# Patient Record
Sex: MALE | Race: WHITE | NOT HISPANIC OR LATINO | Employment: OTHER | URBAN - METROPOLITAN AREA
[De-identification: names, ages, dates, MRNs, and addresses within clinical notes are randomized per-mention and may not be internally consistent; named-entity substitution may affect disease eponyms.]

---

## 2021-02-16 ENCOUNTER — TRANSFERRED RECORDS (OUTPATIENT)
Dept: HEALTH INFORMATION MANAGEMENT | Facility: CLINIC | Age: 71
End: 2021-02-16

## 2021-04-15 ENCOUNTER — TRANSFERRED RECORDS (OUTPATIENT)
Dept: HEALTH INFORMATION MANAGEMENT | Facility: CLINIC | Age: 71
End: 2021-04-15

## 2021-05-17 ENCOUNTER — TRANSFERRED RECORDS (OUTPATIENT)
Dept: HEALTH INFORMATION MANAGEMENT | Facility: CLINIC | Age: 71
End: 2021-05-17

## 2021-08-27 ENCOUNTER — TRANSFERRED RECORDS (OUTPATIENT)
Dept: HEALTH INFORMATION MANAGEMENT | Facility: CLINIC | Age: 71
End: 2021-08-27

## 2021-08-28 ENCOUNTER — TRANSFERRED RECORDS (OUTPATIENT)
Dept: HEALTH INFORMATION MANAGEMENT | Facility: CLINIC | Age: 71
End: 2021-08-28

## 2021-11-12 ENCOUNTER — TRANSFERRED RECORDS (OUTPATIENT)
Dept: HEALTH INFORMATION MANAGEMENT | Facility: CLINIC | Age: 71
End: 2021-11-12

## 2021-12-02 ENCOUNTER — TRANSFERRED RECORDS (OUTPATIENT)
Dept: HEALTH INFORMATION MANAGEMENT | Facility: CLINIC | Age: 71
End: 2021-12-02

## 2022-01-25 ENCOUNTER — TRANSCRIBE ORDERS (OUTPATIENT)
Dept: OTHER | Age: 72
End: 2022-01-25
Payer: COMMERCIAL

## 2022-01-25 DIAGNOSIS — G25.2 OTHER SPECIFIED FORMS OF TREMOR: Primary | ICD-10-CM

## 2022-01-26 ENCOUNTER — DOCUMENTATION ONLY (OUTPATIENT)
Dept: NEUROSURGERY | Facility: CLINIC | Age: 72
End: 2022-01-26
Payer: COMMERCIAL

## 2022-01-26 NOTE — PROGRESS NOTES
Imaging disk received from [facility] and sent to  for processing. Reports sent to scanning    Images on disk:  XR Hand Left 12/2/21  XR Hand Left 12/2/21  US Carotid bilat 8/27/21  XR Hand Right 2/16/21  MRI Brain 5/17/21

## 2022-02-02 ENCOUNTER — TELEPHONE (OUTPATIENT)
Dept: NEUROSURGERY | Facility: CLINIC | Age: 72
End: 2022-02-02
Payer: COMMERCIAL

## 2022-02-02 NOTE — TELEPHONE ENCOUNTER
Writer GEOFF for pt regarding neurosurgery referral    Please schedule a new in person or virtual visit with Dr. Otero for next available    Shakila Cheng

## 2022-02-04 NOTE — TELEPHONE ENCOUNTER
FUTURE VISIT INFORMATION      FUTURE VISIT INFORMATION:    Date: 2/8/2022    Time: 10am    Location: [facility]  REFERRAL INFORMATION:    Referring provider:  Dr. Venegas     Referring providers clinic:  [facility]     Reason for visit/diagnosis  Tremor    RECORDS REQUESTED FROM:       Clinic name Comments Records Status Imaging Status   [facility]   Requested  PACS                                   2/4/2022-Request for records and Imaging reports faxed to [facility]-MR @ 520am    2/8/2022-2nd request for records and Imaging reports faxed to [facility], Dr. Otero Nurse notified-MR @ 612am    2/9/2022-[facility] Records scanned to Chart-MR @ 238pm

## 2022-02-08 ENCOUNTER — VIRTUAL VISIT (OUTPATIENT)
Dept: NEUROSURGERY | Facility: CLINIC | Age: 72
End: 2022-02-08
Payer: COMMERCIAL

## 2022-02-08 ENCOUNTER — PRE VISIT (OUTPATIENT)
Dept: NEUROSURGERY | Facility: CLINIC | Age: 72
End: 2022-02-08

## 2022-02-08 DIAGNOSIS — G25.0 ESSENTIAL TREMOR: Primary | ICD-10-CM

## 2022-02-08 PROCEDURE — 99204 OFFICE O/P NEW MOD 45 MIN: CPT | Mod: 95 | Performed by: NEUROLOGICAL SURGERY

## 2022-02-08 RX ORDER — PANTOPRAZOLE SODIUM 40 MG/1
40 TABLET, DELAYED RELEASE ORAL 2 TIMES DAILY
COMMUNITY
Start: 2021-08-13

## 2022-02-08 RX ORDER — CARBOXYMETHYLCELLULOSE SODIUM 10 MG/ML
1 GEL OPHTHALMIC 4 TIMES DAILY
COMMUNITY
Start: 2021-08-25

## 2022-02-08 RX ORDER — SENNOSIDES A AND B 8.6 MG/1
1 TABLET, FILM COATED ORAL DAILY
COMMUNITY
Start: 2021-12-16 | End: 2022-10-24

## 2022-02-08 RX ORDER — TAMSULOSIN HYDROCHLORIDE 0.4 MG/1
1 CAPSULE ORAL DAILY
COMMUNITY
Start: 2021-12-16

## 2022-02-08 RX ORDER — MIRTAZAPINE 30 MG/1
15 TABLET, FILM COATED ORAL AT BEDTIME
COMMUNITY
Start: 2021-06-10

## 2022-02-08 RX ORDER — BUPROPION HYDROCHLORIDE 150 MG/1
150 TABLET, EXTENDED RELEASE ORAL DAILY
COMMUNITY
End: 2022-09-09

## 2022-02-08 RX ORDER — SIMVASTATIN 40 MG
0.5 TABLET ORAL AT BEDTIME
COMMUNITY
Start: 2021-06-29 | End: 2023-04-03

## 2022-02-08 RX ORDER — CYPROHEPTADINE HYDROCHLORIDE 4 MG/1
12 TABLET ORAL AT BEDTIME
COMMUNITY
Start: 2021-12-13

## 2022-02-08 RX ORDER — FINASTERIDE 5 MG/1
1 TABLET, FILM COATED ORAL DAILY
COMMUNITY
Start: 2021-09-09

## 2022-02-08 RX ORDER — GABAPENTIN 300 MG/1
CAPSULE ORAL
COMMUNITY

## 2022-02-08 RX ORDER — MIRABEGRON 50 MG/1
1 TABLET, EXTENDED RELEASE ORAL DAILY
COMMUNITY
Start: 2021-12-16 | End: 2022-09-09

## 2022-02-08 RX ORDER — TERAZOSIN 10 MG/1
10 CAPSULE ORAL AT BEDTIME
COMMUNITY
End: 2022-09-09

## 2022-02-08 RX ORDER — PRAZOSIN HYDROCHLORIDE 2 MG/1
4 CAPSULE ORAL DAILY
COMMUNITY
End: 2022-09-09

## 2022-02-08 RX ORDER — SERTRALINE HYDROCHLORIDE 100 MG/1
150 TABLET, FILM COATED ORAL DAILY
COMMUNITY
Start: 2021-06-10 | End: 2023-04-03

## 2022-02-08 RX ORDER — FOLIC ACID 1 MG/1
1 TABLET ORAL DAILY
COMMUNITY
Start: 2021-09-09

## 2022-02-08 RX ORDER — TRIAMCINOLONE ACETONIDE 1 MG/G
1 CREAM TOPICAL DAILY PRN
COMMUNITY
Start: 2021-12-16 | End: 2023-04-12

## 2022-02-08 RX ORDER — OXYBUTYNIN CHLORIDE 5 MG/1
5 TABLET ORAL 3 TIMES DAILY
COMMUNITY
End: 2022-09-09

## 2022-02-08 NOTE — LETTER
2/8/2022       RE: Arley Butt  [address] [facility] [address]     Dear Colleague,    Thank you for referring your patient, Arley Butt, to the [facility]OS[facility] [place] at [facility]. Please see a copy of my visit note below.    Neurosurgery Attending DBS Consult    No chief complaint on file.      HISTORY OF PRESENT ILLNESS  70 y/o right handed gentleman with ET referred for neurosurgical consideration for DBS from the [facility]. He has had bilateral hand tremors, R>L for a few years, but it has significantly worsened over the last year or so. Has tried primidone and propranolol without benefit. It is interfering significantly with his daily living activities including eating, drinking, writing. He has not gone through any of the DBS workup here yet or discussed anything with Yusuf or any providers at [facility] yet. He reportedly had neuropsych testing locally which showed mild neurocognitive dysfunction. I let he and his daughter know that he would have to repeat this.       DBS goals: Improve his hand tremor so he can more easily eat/drink      No past medical history on file.    No past surgical history on file.    No family history on file.    Social History     Socioeconomic History     Marital status:      Spouse name: Not on file     Number of children: Not on file     Years of education: Not on file     Highest education level: Not on file   Occupational History     Not on file   Tobacco Use     Smoking status: Not on file     Smokeless tobacco: Not on file   Substance and Sexual Activity     Alcohol use: Not on file     Drug use: Not on file     Sexual activity: Not on file   Other Topics Concern     Not on file   Social History Narrative     Not on file     Social Determinants of Health     Financial Resource Strain: Not on file   Food Insecurity: Not on file   Transportation Needs: Not on file   Physical Activity: Not on  file   Stress: Not on file   Social Connections: Not on file   Intimate Partner Violence: Not on file   Housing Stability: Not on file        Not on File    Current Outpatient Medications   Medication     Carboxymethylcellulose Sodium 1 % GEL     cholecalciferol 25 MCG (1000 UT) TABS     cyproheptadine (PERIACTIN) 4 MG tablet     finasteride (PROSCAR) 5 MG tablet     folic acid (FOLVITE) 1 MG tablet     mirabegron (MYRBETRIQ) 50 MG 24 hr tablet     mirtazapine (REMERON) 30 MG tablet     pantoprazole (PROTONIX) 40 MG EC tablet     propylene glycol (SYSTANE BALANCE) 0.6 % SOLN ophthalmic solution     senna (SENOKOT) 8.6 MG tablet     sertraline (ZOLOFT) 100 MG tablet     simvastatin (ZOCOR) 40 MG tablet     tamsulosin (FLOMAX) 0.4 MG capsule     triamcinolone (KENALOG) 0.1 % external cream     vitamin B-12 (CYANOCOBALAMIN) 1000 MCG tablet     buPROPion (WELLBUTRIN SR) 150 MG 12 hr tablet     gabapentin (NEURONTIN) 300 MG capsule     oxybutynin (DITROPAN) 5 MG tablet     prazosin (MINIPRESS) 2 MG capsule     terazosin (HYTRIN) 10 MG capsule     No current facility-administered medications for this visit.         PHYSICAL EXAM  There were no vitals taken for this visit.    Neurological (Video visit)  Awake, alert and oriented to date, time, place and person. Speech fluent.     ASSESSMENT   71 year old right handed gentleman with a history of ET. We discussed the general process for undergoing DBS surgery for his hand tremor. He will need to go through the DBS workup and I will meet with him again to discuss the surgery and hardware in more detail.     PLAN:  1. Set up DBS workup with Yusuf Otero MD

## 2022-02-08 NOTE — PROGRESS NOTES
Neurosurgery Attending DBS Consult    No chief complaint on file.      HISTORY OF PRESENT ILLNESS  70 y/o right handed gentleman with ET referred for neurosurgical consideration for DBS from the [facility]. He has had bilateral hand tremors, R>L for a few years, but it has significantly worsened over the last year or so. Has tried primidone and propranolol without benefit. It is interfering significantly with his daily living activities including eating, drinking, writing. He has not gone through any of the DBS workup here yet or discussed anything with Yusuf or any providers at [facility] yet. He reportedly had neuropsych testing locally which showed mild neurocognitive dysfunction. I let he and his daughter know that he would have to repeat this.       DBS goals: Improve his hand tremor so he can more easily eat/drink      No past medical history on file.    No past surgical history on file.    No family history on file.    Social History     Socioeconomic History     Marital status:      Spouse name: Not on file     Number of children: Not on file     Years of education: Not on file     Highest education level: Not on file   Occupational History     Not on file   Tobacco Use     Smoking status: Not on file     Smokeless tobacco: Not on file   Substance and Sexual Activity     Alcohol use: Not on file     Drug use: Not on file     Sexual activity: Not on file   Other Topics Concern     Not on file   Social History Narrative     Not on file     Social Determinants of Health     Financial Resource Strain: Not on file   Food Insecurity: Not on file   Transportation Needs: Not on file   Physical Activity: Not on file   Stress: Not on file   Social Connections: Not on file   Intimate Partner Violence: Not on file   Housing Stability: Not on file        Not on File    Current Outpatient Medications   Medication     Carboxymethylcellulose Sodium 1 % GEL     cholecalciferol 25 MCG (1000 UT) TABS     cyproheptadine  (PERIACTIN) 4 MG tablet     finasteride (PROSCAR) 5 MG tablet     folic acid (FOLVITE) 1 MG tablet     mirabegron (MYRBETRIQ) 50 MG 24 hr tablet     mirtazapine (REMERON) 30 MG tablet     pantoprazole (PROTONIX) 40 MG EC tablet     propylene glycol (SYSTANE BALANCE) 0.6 % SOLN ophthalmic solution     senna (SENOKOT) 8.6 MG tablet     sertraline (ZOLOFT) 100 MG tablet     simvastatin (ZOCOR) 40 MG tablet     tamsulosin (FLOMAX) 0.4 MG capsule     triamcinolone (KENALOG) 0.1 % external cream     vitamin B-12 (CYANOCOBALAMIN) 1000 MCG tablet     buPROPion (WELLBUTRIN SR) 150 MG 12 hr tablet     gabapentin (NEURONTIN) 300 MG capsule     oxybutynin (DITROPAN) 5 MG tablet     prazosin (MINIPRESS) 2 MG capsule     terazosin (HYTRIN) 10 MG capsule     No current facility-administered medications for this visit.         PHYSICAL EXAM  There were no vitals taken for this visit.    Neurological (Video visit)  Awake, alert and oriented to date, time, place and person. Speech fluent.           ASSESSMENT   71 year old right handed gentleman with a history of ET. We discussed the general process for undergoing DBS surgery for his hand tremor. He will need to go through the DBS workup and I will meet with him again to discuss the surgery and hardware in more detail.     PLAN:  1. Set up DBS workup with Yusuf Leary            Arley is a 71 year old who is being evaluated via a billable video visit.      How would you like to obtain your AVS? Mychart  If the video visit is dropped, the invitation should be resent by: [phone]      Video Start Time: 10:25  Video-Visit Details    Type of service:  Video Visit    Video End Time:10:58    Originating Location (pt. Location): Home    Distant Location (provider location):  [facility] NEUROSURGERY [facility]     Platform used for Video Visit: [employer]

## 2022-02-09 ENCOUNTER — TELEPHONE (OUTPATIENT)
Dept: NEUROLOGY | Facility: CLINIC | Age: 72
End: 2022-02-09
Payer: COMMERCIAL

## 2022-02-09 NOTE — TELEPHONE ENCOUNTER
Spoke to Leslie, patient's daughter. The patient was scheduled on 2/23/22 at 2:30 PM with Dr. Plummer.

## 2022-02-09 NOTE — TELEPHONE ENCOUNTER
----- Message from Lana Rodriguez RN sent at 2/8/2022  3:33 PM CST -----  Regarding: [facility] referral for DBS  Hi, Yusuf.     [facility] to [facility] for DBS for treatment of tremor. He was mistakenly scheduled in Dr. Otero's clinic instead of going through one of the movement neurologists first. We were aware of this and Dr. Otero agreed to meet with the pt anyway. Pt has a community care referral even though DBS can now be done at the [facility]. [facility]     Can you follow-up with the pt to schedule him for a consult with a movement disorder provider to get the process going? If the provider thinks he should proceed with workup, Dr. Otero would like the pt to be scheduled with him again, as they did not discuss surgery or the different devices in detail.     Let me know if you have any questions. I have some records which I can send to HIM from [employer]. Let me know if you want me to forward them to you as well.     Thank you,  Lana  ----- Message -----  From: Bartolome Barlow MA  Sent: 2/8/2022   6:29 AM CST  To: Lana Rodriguez RN  Subject: [facility] Records and Imaging reports         We are still waiting on records from the [facility]. 2 Request have been made including this morning. I will keep you posted once received.       Farhat Barlow GERARD/CSS

## 2022-02-10 NOTE — TELEPHONE ENCOUNTER
RECORDS RECEIVED FROM: External   REASON FOR VISIT: DBS consult    Date of Appt: 2/23/22   NOTES (FOR ALL VISITS) STATUS DETAILS   OFFICE NOTE from referring provider Received Dr Theo Ny @ [facility] Neurology:  8/20/21   OFFICE NOTES from other providers Received Logan Cerda @ [facility]:  7/30/21   MEDICATION LIST Received    IMAGING  (FOR ALL VISITS)     ULTRASOUND (CAROTID BILAT) *VASCULAR* Received [facility]:  US Carotid Bilat 8/27/21   MRI (HEAD, NECK, SPINE) Received [facility]:  MRI Brain 5/17/21      Action 2/10/22 MV 10.12am   Action Taken Records request faxed to [facility]    --2/21/22 MV 11.06am--  Tried calling [facility], no answer. LVM. Also refaxed request.    --2/22/22 MV 12.37pm--  Recs received from [facility] and sent to scanning

## 2022-02-18 NOTE — PROGRESS NOTES
Department of Neurology  Movement Disorders Division   New Patient Visit    Patient: Arley Butt   MRN: [id number]   : 1950   Date of Visit: 2022    CC: DBS for tremor    HPI:  Mr. Butt is a 72 yo right handed man who was referred for evaluation of DBS for tremor.  Started about 4 years ago in his right hand.  He is accompanied by his daughter to helps to provide history.  Has progressively worsened and now present in the left hand at times.  No tremor elsewhere in his body.  He doesn't drink alcohol for the past 8 years.  No rest tremor, only with activity.  No alleviating or aggravating symptoms.    He is using weighted utensils.    Currently weaning off of gabapentin.  He takes this for mood and nightmares.  Hasn't impacted his tremor.    Primidone and propranolol tried previously but were ineffective.  Carbidopa/levodopa made him orthostatic and didn't help his tremor either.    His thinks his father had tremor.    Goal for DBS: to improve his writing and eating, to make it easier to do his hobbies again, decrease frustrations    Treat right hand first.       Tremor ADL Scale  1. Speaking 0 (0) Normal   2. Feeding with a spoon 3 (3) Moderately abnormal. Spills a lot or changes strategy to complete task such as using two hands or leaning over.   3. Drinking from a glass 3 (3) Moderately abnormal. Spills a lot or changes strategy to complete task such as using two hands or leaning over.   4. Hygiene 1 (1) Slightly abnormal. Tremor is present but does not interfere with hygiene.   5. Dressing 2 (2) Mildly abnormal. Able to do everything but has difficulty due to tremor.   6. Pouring 3 (3) Moderately abnormal. Must use two hands or uses other strategies to avoid spilling.   7. Carrying food trays, plates or similar items 0 (0) Normal   8. Using keys 3 (3) Moderately abnormal. Needs to use two hands or other strategies to put key in lock.   9. Writing 4 (4) Severely abnormal. Cannot write.   10.  Working 2 (2) Mildly abnormal. Tremor interferes with work, able to do everything, but with errors.   11. Overall disability with the most affected task 4 (4) Severely abnormal. Cannot do the task.   Name of most affected task Writing Writing   12. Social impact 1 (1) Aware of tremor, but it does not affect lifestyle or professional life.   ADL TOTAL 26          Related Medications        Gabapentin 300mg 1 1 1 2        ROS:  All others negative except as listed above.    Past Medical History:   Diagnosis Date     Alcohol dependence (H)      Anemia      Anxiety      BPH (benign prostatic hyperplasia)      Cataract      Chronic pain      DVT (deep venous thrombosis) (H)      Dysphagia      Dysthymia      Erosive gastritis      Esophagitis      Follicular lymphoma (H)      Foot sprain      Gastroesophageal reflux disease with esophagitis      HTN (hypertension)      Hyperlipidemia      Impacted cerumen      MCI (mild cognitive impairment)      Osteoarthritis      Presbyopia      PTSD (post-traumatic stress disorder)      Sensorineural hearing loss, bilateral      Tinnitus, bilateral      Urinary frequency         Past Surgical History:   Procedure Laterality Date     ANKLE SURGERY Right      BACK SURGERY      lower back, herniated disc     CYSTECTOMY      pilonidal     VASECTOMY          Current Outpatient Medications   Medication Sig Dispense Refill     buPROPion (WELLBUTRIN SR) 150 MG 12 hr tablet Take 150 mg by mouth daily       Carboxymethylcellulose Sodium 1 % GEL Apply 1 drop to eye At Bedtime       cholecalciferol 25 MCG (1000 UT) TABS Take 1 tablet by mouth daily       cyproheptadine (PERIACTIN) 4 MG tablet Take 3 tablets by mouth nightly as needed       finasteride (PROSCAR) 5 MG tablet Take 1 tablet by mouth daily       folic acid (FOLVITE) 1 MG tablet Take 1 tablet by mouth daily       gabapentin (NEURONTIN) 300 MG capsule Take 300 mg by mouth 3 times daily       mirabegron (MYRBETRIQ) 50 MG 24 hr tablet  Take 1 tablet by mouth daily       mirtazapine (REMERON) 30 MG tablet Take 1.5 tablets by mouth daily       oxybutynin (DITROPAN) 5 MG tablet Take 5 mg by mouth 3 times daily       pantoprazole (PROTONIX) 40 MG EC tablet Take 1 tablet by mouth 2 times daily       prazosin (MINIPRESS) 2 MG capsule Take 4 mg by mouth daily       propylene glycol (SYSTANE BALANCE) 0.6 % SOLN ophthalmic solution Apply 1 drop to eye 4 times daily       senna (SENOKOT) 8.6 MG tablet Take 1 tablet by mouth as needed       sertraline (ZOLOFT) 100 MG tablet Take 1.5 tablets by mouth daily       simvastatin (ZOCOR) 40 MG tablet Take 0.5 tablets by mouth daily       tamsulosin (FLOMAX) 0.4 MG capsule Take 1 capsule by mouth daily       terazosin (HYTRIN) 10 MG capsule Take 10 mg by mouth At Bedtime       triamcinolone (KENALOG) 0.1 % external cream Apply 1 Film topically daily       vitamin B-12 (CYANOCOBALAMIN) 1000 MCG tablet Take 1 tablet by mouth daily         No Known Allergies     Family History   Problem Relation Age of Onset     Tremor Father         Social History     Socioeconomic History     Marital status:      Spouse name: Not on file     Number of children: Not on file     Years of education: Not on file     Highest education level: Not on file   Occupational History     Not on file   Tobacco Use     Smoking status: Not on file     Smokeless tobacco: Not on file   Substance and Sexual Activity     Alcohol use: Not on file     Drug use: Not on file     Sexual activity: Not on file   Other Topics Concern     Not on file   Social History Narrative     Not on file     Social Determinants of Health     Financial Resource Strain: Not on file   Food Insecurity: Not on file   Transportation Needs: Not on file   Physical Activity: Not on file   Stress: Not on file   Social Connections: Not on file   Intimate Partner Violence: Not on file   Housing Stability: Not on file        PHYSICAL EXAM:  /75   Pulse 74   Resp 16   Wt  88.9 kg (196 lb)   SpO2 97%     Gen: alert, active, attentive, appropriately groomed   HEENT: normocephalic, eyes open with no discharge  Chest: normal wob on ra  Psych: mood stable     NEURO:  MS: Alert and oriented to person, place, time, and situation.  Speech normal to comprehension.  Recent and remote memory intact.  Attention and concentration normal.  Fund of knowledge normal.    CN:  II:  VFF.  III, IV, VI: EOM normal range, no nystagmus.  Normal saccades.  V:  Facial sensation intact.  VII: Face symmetric at rest and with activation  VIII: Intact to finger rub bilaterally.  IX, X: Palate rise b/l, uvula midline.  No dysarthria.  XI: Trapezius and SCM 5/5 bilaterally.  XII: Tongue protrudes midline. No fasciculation or atrophy noted.    Motor:  Normal bulk and tone throughout upper and lower extremities.  See TETRAS below.  R/L  Shoulder abd      5/5  Elbow flex  5/5  Elbow ext  5/5     5/5    Hip flex   5/5  Knee flex  5/5  Knee ext  5/5  Dorsiflex  5/5  Plantar flex  5/5    Reflexes:  R/L  Biceps   2+/2+  Patellar  1/1  Achilles  1/1  No clonus.     Sensation:  Intact to LT in all extremities.    Coordination:  FTN and HTN intact bilaterally.    Gait:  Tandem gait intact.  Romberg negative.  Normal gait.    Motor (Performance) Sub-Scale 2/23/2022   Assessment Time 2:39 PM   Medication None   DBS - Right Brain None   DBS - Left Brain None   Head 0   Face & Jaw 0   Voice 1   Outstretched - RIGHT 1   Outstretched - LEFT 1   Wingbeating - RIGHT 1.5   Wingbeating - LEFT 1.5   Kinetic - RIGHT 2   Kinetic - LEFT 2   Lower Limb - RIGHT 0   Lower Limb - LEFT 0   Lower Limb (Max) 0   Spiral - RIGHT 2.5   Spiral - LEFT 2   Handwriting 4   Dot approx - RIGHT 2.5   Dot approx - LEFT 2   Trunk (Standing) 0   Total Right 9.5   Total Left 8.5   Axial 1   TOTAL 23         ASSESSMENT/PLAN:  Mr. Butt is a 72 yo right handed man who was referred for evaluation of DBS for tremor.  On exam his tremor is consistent with  an essential tremor and is of significant severity/functional limitation to warrant work-up for DBS.  The surgery, work-up process, and possible complications were discussed.    - MyChart proxy access for daughter Leslie Vigil, [phone]  - Scheduling through [phone]  - Neuropsych referral  - Neurosurg follow-up  - MRI brain 3T  - Motor testing  - Coordinate appointments due to long-distance drive      Kaylynn Plummer MD  Movement Disorders Fellow      48 minutes spent on the date of the encounter doing chart review, history and exam, documentation and further activities as noted above.

## 2022-02-23 ENCOUNTER — TELEPHONE (OUTPATIENT)
Dept: NEUROLOGY | Facility: CLINIC | Age: 72
End: 2022-02-23

## 2022-02-23 ENCOUNTER — PRE VISIT (OUTPATIENT)
Dept: NEUROLOGY | Facility: CLINIC | Age: 72
End: 2022-02-23

## 2022-02-23 ENCOUNTER — OFFICE VISIT (OUTPATIENT)
Dept: NEUROLOGY | Facility: CLINIC | Age: 72
End: 2022-02-23
Payer: COMMERCIAL

## 2022-02-23 VITALS
DIASTOLIC BLOOD PRESSURE: 75 MMHG | SYSTOLIC BLOOD PRESSURE: 114 MMHG | WEIGHT: 196 LBS | OXYGEN SATURATION: 97 % | HEART RATE: 74 BPM | RESPIRATION RATE: 16 BRPM

## 2022-02-23 DIAGNOSIS — G25.0 ESSENTIAL TREMOR: Primary | ICD-10-CM

## 2022-02-23 PROCEDURE — 99204 OFFICE O/P NEW MOD 45 MIN: CPT | Performed by: STUDENT IN AN ORGANIZED HEALTH CARE EDUCATION/TRAINING PROGRAM

## 2022-02-23 ASSESSMENT — ACTIVITIES OF DAILY LIVING (ADL)
SOCIAL_IMPACT: (1) AWARE OF TREMOR, BUT IT DOES NOT AFFECT LIFESTYLE OR PROFESSIONAL LIFE.
WRITING: (4) SEVERELY ABNORMAL. CANNOT WRITE.
HYGIENE: (1) SLIGHTLY ABNORMAL. TREMOR IS PRESENT BUT DOES NOT INTERFERE WITH HYGIENE.
OVERALL_DISABILITY_WITH_THE_MOST_AFFECTED_TASK: (4) SEVERELY ABNORMAL. CANNOT DO THE TASK.
FEEDING_WITH_A_SPOON: (3) MODERATELY ABNORMAL. SPILLS A LOT OR CHANGES STRATEGY TO COMPLETE TASK SUCH AS USING TWO HANDS OR LEANING OVER.
POURING: (3) MODERATELY ABNORMAL. MUST USE TWO HANDS OR USES OTHER STRATEGIES TO AVOID SPILLING.
TOTAL_SCORE: 26
CARRYING_FOOD_TRAYS_PLATES_OR_SIMILAR_ITEMS: (0) NORMAL
DRESS: (2) MILDLY ABNORMAL. ABLE TO DO EVERYTHING BUT HAS DIFFICULTY DUE TO TREMOR.
WORKING: (2) MILDLY ABNORMAL. TREMOR INTERFERES WITH WORK, ABLE TO DO EVERYTHING, BUT WITH ERRORS.
DRINKING_FROM_A_GLASS: (3) MODERATELY ABNORMAL. SPILLS A LOT OR CHANGES STRATEGY TO COMPLETE TASK SUCH AS USING TWO HANDS OR LEANING OVER.
USING_KEYS: (3) MODERATELY ABNORMAL. NEEDS TO USE TWO HANDS OR OTHER STRATEGIES TO PUT KEY IN LOCK.
SPEAKING: (0) NORMAL
OVERALL_DISABILITY_WITH_THE_MOST_AFFECTED_TASK: WRITING

## 2022-02-23 ASSESSMENT — PAIN SCALES - GENERAL: PAINLEVEL: MILD PAIN (3)

## 2022-02-23 NOTE — TELEPHONE ENCOUNTER
----- Message from Kaylynn Plummer MD sent at 2/23/2022  3:09 PM CST -----  Regarding: DBS workup  Hi team,  I saw Mr. Butt today for essential tremor and we're going to go ahead with the workup.  I ordered MRI and neuropsych which hopefully can be coordinated the limit the number of trips they have to make.  He has already seen Dr. Otero but will need another appointment to discuss possible surgery.  Then he just needs videotaping.  Scheduling goes through his daughter Svitlana at [phone].  Auth to share and MyChart proxy forms were signed.    Yusuf, can you sent up his daughter Leslie with proxy access?   Leslie Yaritza, [phone]    Thanks,  Kaylynn

## 2022-02-23 NOTE — PATIENT INSTRUCTIONS
[facility] DBS Handbook  Try copying and pasting this url if the link above does not work:   [email or web address]    ----------------------------------------------------------------------------  DBS Class Video Part 1 (33 Minutes)  [email or web address]    DBS Class Video Part 2 (22 minutes)  [email or web address]    DBS Class Video Part 3 (15 minutes)  [email or web address]    DBS Class Video Part 4 (10 minutes)    [email or web address]

## 2022-02-23 NOTE — NURSING NOTE
Chief Complaint   Patient presents with     Consult     NEW MOVEMENT DISORDER - DBS consult for tremor     Romain Abbott

## 2022-02-23 NOTE — LETTER
February 28, 2022    RE: Arley Butt  [address] [facility] [address]    Dr. Theo Ludwig  [facility]  [address] [place] [address]    Dear Dr. Ludwig,    We have received your Deep Brain Stimulation referral for Arley. Thank you for providing us with the opportunity to partner with you in the care of Arley. Arley was scheduled to see Dr. Kaylynn Plummer on 2/23/22. Shikha Grayson RN Deep Brain Stimulation , will send you the final decision once all testing is completed and the DBS Consensus group has met and discussed Arley. If you have any questions regarding the process, do not hesitate to call my direct desk number listed below.    I look forward to calling Arley and discussing our Deep Brain Stimulation program!    Best Regards,  Yusuf Leary CMA  DBS Care Coordinator  [facility]  Office phone:  [phone]     [facility]  [facility]  [address]  [address] [place] [id number]  Clinic Phone: [phone]  Fax: [phone]

## 2022-02-24 ENCOUNTER — TELEPHONE (OUTPATIENT)
Dept: NEUROLOGY | Facility: CLINIC | Age: 72
End: 2022-02-24
Payer: COMMERCIAL

## 2022-02-24 NOTE — TELEPHONE ENCOUNTER
Spoke to Svitlana and got the patient scheduled for his work up appointments.    Svitlana stated the patient does not use any form of electronics/smart phones/tablets. Svitlana stated she will speak to Leslie, her sister, about this since she is the one who deals with majority of the patient's medical situations online. Svitlana stated she since she lives close to the [facility] and does not work, she can take the patient to his appointments.    A message was sent to the patient on [employer] about [employer] [facility].

## 2022-02-24 NOTE — TELEPHONE ENCOUNTER
Spoke to Svitlana. Svitlana stated she was under the impression that Dr. Otero was the one to decide if the patient was a candidate. Svitlana was informed the decision would be made as a group consisting of our neuropsychologist, neurosurgeons, movements disorder providers, and nurses. Svitlana was encouraged to review the Deep Brain Stimulation videos and handbook with the patient and any other family member. Svitlana had no further questions.

## 2022-02-24 NOTE — TELEPHONE ENCOUNTER
----- Message from Cornelio Palmer sent at 2/24/2022 12:58 PM CST -----  Hi Yusuf!    This patient's daughter called me in regards to a appointment they have with Dr. Otero and that there may be a conflict can you reach back out to the daughter?       Thank you,  Cornelio

## 2022-03-17 ENCOUNTER — ANCILLARY PROCEDURE (OUTPATIENT)
Dept: MRI IMAGING | Facility: CLINIC | Age: 72
End: 2022-03-17
Attending: STUDENT IN AN ORGANIZED HEALTH CARE EDUCATION/TRAINING PROGRAM
Payer: COMMERCIAL

## 2022-03-17 DIAGNOSIS — G25.0 ESSENTIAL TREMOR: ICD-10-CM

## 2022-03-17 RX ORDER — GADOBUTROL 604.72 MG/ML
10 INJECTION INTRAVENOUS ONCE
Status: COMPLETED | OUTPATIENT
Start: 2022-03-17 | End: 2022-03-17

## 2022-03-17 RX ADMIN — GADOBUTROL 8.9 ML: 604.72 INJECTION INTRAVENOUS at 12:46

## 2022-04-01 ENCOUNTER — MEDICAL CORRESPONDENCE (OUTPATIENT)
Dept: HEALTH INFORMATION MANAGEMENT | Facility: CLINIC | Age: 72
End: 2022-04-01
Payer: COMMERCIAL

## 2022-04-03 ENCOUNTER — HEALTH MAINTENANCE LETTER (OUTPATIENT)
Age: 72
End: 2022-04-03

## 2022-04-05 ENCOUNTER — VIRTUAL VISIT (OUTPATIENT)
Dept: NEUROSURGERY | Facility: CLINIC | Age: 72
End: 2022-04-05
Payer: COMMERCIAL

## 2022-04-05 DIAGNOSIS — G25.0 ESSENTIAL TREMOR: Primary | ICD-10-CM

## 2022-04-05 PROCEDURE — 99215 OFFICE O/P EST HI 40 MIN: CPT | Mod: GT | Performed by: NEUROLOGICAL SURGERY

## 2022-04-05 ASSESSMENT — PATIENT HEALTH QUESTIONNAIRE - PHQ9: SUM OF ALL RESPONSES TO PHQ QUESTIONS 1-9: 14

## 2022-04-05 NOTE — PROGRESS NOTES
Neurosurgery Attending DBS Consult    Chief Complaint   Patient presents with     VIDEO VISIT     Discuss DBS Surgery        HISTORY OF PRESENT ILLNESS  72 y/o right handed gentleman with ET referred for neurosurgical consideration for DBS from the [facility]. See my prior note for details. In brief, he has had bilateral hand tremors, R>L for a few years, but it has significantly worsened over the last year or so. Has tried primidone and propranolol without benefit. It is interfering significantly with his daily living activities including eating, drinking, writing. He is now completing the DBS workup here and we will likely discuss his case in the next month or so.        DBS goals: Improve his hand tremor so he can more easily eat/drink          ON/OFF testing: awaiting    MRI: Moderate cortical atrophy, minimal subcortical white matter ischemic disease. Moderate size lateral ventricles    Neuropsych testing: Awaiting        Past Medical History:   Diagnosis Date     Alcohol dependence (H)      Anemia      Anxiety      BPH (benign prostatic hyperplasia)      Cataract      Chronic pain      DVT (deep venous thrombosis) (H)      Dysphagia      Dysthymia      Erosive gastritis      Esophagitis      Follicular lymphoma (H)      Foot sprain      Gastroesophageal reflux disease with esophagitis      HTN (hypertension)      Hyperlipidemia      Impacted cerumen      MCI (mild cognitive impairment)      Osteoarthritis      Presbyopia      PTSD (post-traumatic stress disorder)      Sensorineural hearing loss, bilateral      Tinnitus, bilateral      Urinary frequency        Past Surgical History:   Procedure Laterality Date     ANKLE SURGERY Right      BACK SURGERY      lower back, herniated disc     CYSTECTOMY      pilonidal     VASECTOMY         Family History   Problem Relation Age of Onset     Tremor Father        Social History     Socioeconomic History     Marital status:      Spouse name: Not on file      Number of children: Not on file     Years of education: Not on file     Highest education level: Not on file   Occupational History     Not on file   Tobacco Use     Smoking status: Never Smoker     Smokeless tobacco: Never Used   Substance and Sexual Activity     Alcohol use: Not on file     Drug use: Not on file     Sexual activity: Not on file   Other Topics Concern     Not on file   Social History Narrative     Not on file     Social Determinants of Health     Financial Resource Strain: Not on file   Food Insecurity: Not on file   Transportation Needs: Not on file   Physical Activity: Not on file   Stress: Not on file   Social Connections: Not on file   Intimate Partner Violence: Not on file   Housing Stability: Not on file        No Known Allergies    Current Outpatient Medications   Medication     buPROPion (WELLBUTRIN SR) 150 MG 12 hr tablet     Carboxymethylcellulose Sodium 1 % GEL     cholecalciferol 25 MCG (1000 UT) TABS     cyproheptadine (PERIACTIN) 4 MG tablet     finasteride (PROSCAR) 5 MG tablet     folic acid (FOLVITE) 1 MG tablet     gabapentin (NEURONTIN) 300 MG capsule     mirabegron (MYRBETRIQ) 50 MG 24 hr tablet     mirtazapine (REMERON) 30 MG tablet     oxybutynin (DITROPAN) 5 MG tablet     pantoprazole (PROTONIX) 40 MG EC tablet     prazosin (MINIPRESS) 2 MG capsule     propylene glycol (SYSTANE BALANCE) 0.6 % SOLN ophthalmic solution     senna (SENOKOT) 8.6 MG tablet     sertraline (ZOLOFT) 100 MG tablet     simvastatin (ZOCOR) 40 MG tablet     tamsulosin (FLOMAX) 0.4 MG capsule     terazosin (HYTRIN) 10 MG capsule     triamcinolone (KENALOG) 0.1 % external cream     vitamin B-12 (CYANOCOBALAMIN) 1000 MCG tablet     NONFORMULARY     No current facility-administered medications for this visit.         PHYSICAL EXAM  There were no vitals taken for this visit.    Neurological Exam (Video visit):  Awake, alert and oriented to date, time, place and person. Speech fluent.           ASSESSMENT   71  year old right handed gentleman with a history of ET. Interested in rechargeable although family expressed concerns about working with technology.    During today's visit, we discussed both phase I and II of DBS surgery. We discussed that Phase I would place the DBS electrode on the left side under MAC and microelectrode recording. He would then return one week later for phase II with placement of the DBS generator/battery over the left chest wall under general anesthesia. He  would then return three weeks later for phase I with placement of the DBS electrode on the right side under MAC and microelectrode recording.    Briefly, the following topics were discussed regarding device options:    [employer]  MRI compatible.  Non-rechargeable and rechargeable generator/battery available.  8 contact segmented/directional electrode.  Communication method: have the  or patient controller on the skin directly over the generator/battery.    [employer]  MRI compatible.  Non-rechargeable generator/battery.  8 contact segmented/directional electrode.  Communication method: Wireless/bluetooth.    [employer]  MRI compatible.    Rechargeable generator/battery.  8 contact segmented/directional electrode.  Independent current control at each contacts.  Communication method: Wireless.    I did discuss that the implant technique for these devices are relatively the same which was discussed again.  They all have similar risks associated with the surgery.    The risks, benefits, alternative therapies were discussed with the patient, including but not limited to infection and bleeding. Surgical procedure was discussed in detail. All questions were answered, and he  expressed understanding. His  case will be discussed at the Movement Disorder Consensus Group Meeting to determine whether he  is a good candidate for DBS surgery.     PLAN:  1. We will discuss his  case at the Movement Disorder Consensus Group Meeting, and we will  contact him  regarding his DBS candidacy.                 Arley is a 71 year old who is being evaluated via a billable video visit.      How would you like to obtain your AVS? Mail a copy  If the video visit is dropped, the invitation should be resent by: Text to cell phone: [phone]  Will anyone else be joining your video visit? Yes: Leslie and Svitlana (daughters) . How would they like to receive their invitation? Send to e-mail at: [email or web address]      Video Start Time: 9:05 am  Video-Visit Details    Type of service:  Video Visit    Video End Time: 9:45 am    Originating Location (pt. Location): Home    Distant Location (provider location):  [facility] NEUROSURGERY [facility]     Platform used for Video Visit: Obvious

## 2022-04-05 NOTE — LETTER
4/5/2022       RE: Arley Butt  [address] [facility] [address]     Dear Colleague,    Thank you for referring your patient, Arley Butt, to the [facility] NEUROSURGERY [facility]INIC [place] [facility]. Please see a copy of my visit note below.    Neurosurgery Attending DBS Consult    Chief Complaint   Patient presents with     VIDEO VISIT     Discuss DBS Surgery        HISTORY OF PRESENT ILLNESS  72 y/o right handed gentleman with ET referred for neurosurgical consideration for DBS from the [facility]. See my prior note for details. In brief, he has had bilateral hand tremors, R>L for a few years, but it has significantly worsened over the last year or so. Has tried primidone and propranolol without benefit. It is interfering significantly with his daily living activities including eating, drinking, writing. He is now completing the DBS workup here and we will likely discuss his case in the next month or so.        DBS goals: Improve his hand tremor so he can more easily eat/drink          ON/OFF testing: awaiting    MRI: Moderate cortical atrophy, minimal subcortical white matter ischemic disease. Moderate size lateral ventricles    Neuropsych testing: Awaiting        Past Medical History:   Diagnosis Date     Alcohol dependence (H)      Anemia      Anxiety      BPH (benign prostatic hyperplasia)      Cataract      Chronic pain      DVT (deep venous thrombosis) (H)      Dysphagia      Dysthymia      Erosive gastritis      Esophagitis      Follicular lymphoma (H)      Foot sprain      Gastroesophageal reflux disease with esophagitis      HTN (hypertension)      Hyperlipidemia      Impacted cerumen      MCI (mild cognitive impairment)      Osteoarthritis      Presbyopia      PTSD (post-traumatic stress disorder)      Sensorineural hearing loss, bilateral      Tinnitus, bilateral      Urinary frequency        Past Surgical History:   Procedure Laterality  Date     ANKLE SURGERY Right      BACK SURGERY      lower back, herniated disc     CYSTECTOMY      pilonidal     VASECTOMY         Family History   Problem Relation Age of Onset     Tremor Father        Social History     Socioeconomic History     Marital status:      Spouse name: Not on file     Number of children: Not on file     Years of education: Not on file     Highest education level: Not on file   Occupational History     Not on file   Tobacco Use     Smoking status: Never Smoker     Smokeless tobacco: Never Used   Substance and Sexual Activity     Alcohol use: Not on file     Drug use: Not on file     Sexual activity: Not on file   Other Topics Concern     Not on file   Social History Narrative     Not on file     Social Determinants of Health     Financial Resource Strain: Not on file   Food Insecurity: Not on file   Transportation Needs: Not on file   Physical Activity: Not on file   Stress: Not on file   Social Connections: Not on file   Intimate Partner Violence: Not on file   Housing Stability: Not on file        No Known Allergies    Current Outpatient Medications   Medication     buPROPion (WELLBUTRIN SR) 150 MG 12 hr tablet     Carboxymethylcellulose Sodium 1 % GEL     cholecalciferol 25 MCG (1000 UT) TABS     cyproheptadine (PERIACTIN) 4 MG tablet     finasteride (PROSCAR) 5 MG tablet     folic acid (FOLVITE) 1 MG tablet     gabapentin (NEURONTIN) 300 MG capsule     mirabegron (MYRBETRIQ) 50 MG 24 hr tablet     mirtazapine (REMERON) 30 MG tablet     oxybutynin (DITROPAN) 5 MG tablet     pantoprazole (PROTONIX) 40 MG EC tablet     prazosin (MINIPRESS) 2 MG capsule     propylene glycol (SYSTANE BALANCE) 0.6 % SOLN ophthalmic solution     senna (SENOKOT) 8.6 MG tablet     sertraline (ZOLOFT) 100 MG tablet     simvastatin (ZOCOR) 40 MG tablet     tamsulosin (FLOMAX) 0.4 MG capsule     terazosin (HYTRIN) 10 MG capsule     triamcinolone (KENALOG) 0.1 % external cream     vitamin B-12  (CYANOCOBALAMIN) 1000 MCG tablet     NONFORMULARY     No current facility-administered medications for this visit.         PHYSICAL EXAM  There were no vitals taken for this visit.    Neurological Exam (Video visit):  Awake, alert and oriented to date, time, place and person. Speech fluent.           ASSESSMENT   71 year old right handed gentleman with a history of ET. Interested in rechargeable although family expressed concerns about working with technology.    During today's visit, we discussed both phase I and II of DBS surgery. We discussed that Phase I would place the DBS electrode on the left side under MAC and microelectrode recording. He would then return one week later for phase II with placement of the DBS generator/battery over the left chest wall under general anesthesia. He  would then return three weeks later for phase I with placement of the DBS electrode on the right side under MAC and microelectrode recording.    Briefly, the following topics were discussed regarding device options:    [employer]  MRI compatible.  Non-rechargeable and rechargeable generator/battery available.  8 contact segmented/directional electrode.  Communication method: have the  or patient controller on the skin directly over the generator/battery.    [employer]  MRI compatible.  Non-rechargeable generator/battery.  8 contact segmented/directional electrode.  Communication method: Wireless/bluetooth.    [employer]  MRI compatible.    Rechargeable generator/battery.  8 contact segmented/directional electrode.  Independent current control at each contacts.  Communication method: Wireless.    I did discuss that the implant technique for these devices are relatively the same which was discussed again.  They all have similar risks associated with the surgery.    The risks, benefits, alternative therapies were discussed with the patient, including but not limited to infection and bleeding. Surgical procedure was  discussed in detail. All questions were answered, and he  expressed understanding. His  case will be discussed at the Movement Disorder Consensus Group Meeting to determine whether he  is a good candidate for DBS surgery.     PLAN:  1. We will discuss his  case at the Movement Disorder Consensus Group Meeting, and we will contact him  regarding his DBS candidacy.       Sincerely,    Manuel Otero MD

## 2022-04-11 ENCOUNTER — TELEPHONE (OUTPATIENT)
Dept: NEUROLOGY | Facility: CLINIC | Age: 72
End: 2022-04-11
Payer: COMMERCIAL

## 2022-04-11 NOTE — TELEPHONE ENCOUNTER
Svitlana, patient's daughter, left a message for the writer to call her back as the patient recently received a call about adding on an appointment to the patient's Wednesday appointment on 4/13/22 at [facility].    Spoke to Svitlana and Svitlana stated the patient informed her he recevied a call on Friday at 3 PM [facility] stating they were going to add on an appointment to the patient's future appointment on 4/13/22. The writer informed vSitlana that she is not aware of any new appointments that needed to be added onto the patients upcoming 4/13 appointment. The writer informed her that the [facility] cannot make the appointments here and they would need to contact [facility] to make an appointment. The writer informed Svitlana that it may be regarding billing from the [facility]. Svitlana stated she will have her sister Leslie check in with the [facility] about this issue. The writer had confirmed the patient's upcoming work up appointments on 4/13 and 4/14 with Svitlana.    Svitlana had no further questions at this time.

## 2022-04-11 NOTE — PROGRESS NOTES
Department of Neurology  Movement Disorders Division   Motor Testing Note    Patient: Arley Butt   MRN: [id number]   : 1950   Date of Visit: 2022    HPI:  Mr. Butt is a 70 yo right handed man with ET who returns for motor testing as a part of DBS evaluation.  See office note from 2022 for extensive history.    Sx onset: 4 years ago ()  Dx: 1 year ago ()  Goal: woodworking, hammering in nails, eating, writing, go out to restaurants in public  Side: right body, ideally both    Met with Dr. Otero and had MRI brain completed. Cannot recall risks of the procedure today.    ADL Sub-Scale 2022   1. Speaking 0   2. Feeding with a spoon 3   3. Drinking from a glass 3   4. Hygiene 1   5. Dressing 2   6. Pouring 3   7. Carrying food trays, plates or similar items 0   8. Using keys 3   9. Writing 4   10. Working 2   11. Overall disability with the most affected task 4   Name of most affected task: Writing   12. Social impact 1   ADL TOTAL 26         Related Medications             Gabapentin 300mg 1 1 2 2    Last taken at today 13:00    ROS:  All others negative except as listed above.    Past Medical History:   Diagnosis Date     Alcohol dependence (H)      Anemia      Anxiety      BPH (benign prostatic hyperplasia)      Cataract      Chronic pain      DVT (deep venous thrombosis) (H)      Dysphagia      Dysthymia      Erosive gastritis      Esophagitis      Follicular lymphoma (H)      Foot sprain      Gastroesophageal reflux disease with esophagitis      HTN (hypertension)      Hyperlipidemia      Impacted cerumen      MCI (mild cognitive impairment)      Osteoarthritis      Presbyopia      PTSD (post-traumatic stress disorder)      Sensorineural hearing loss, bilateral      Tinnitus, bilateral      Urinary frequency         Past Surgical History:   Procedure Laterality Date     ANKLE SURGERY Right      BACK SURGERY      lower back, herniated disc     CYSTECTOMY      pilonidal      VASECTOMY          Current Outpatient Medications   Medication Sig Dispense Refill     buPROPion (WELLBUTRIN SR) 150 MG 12 hr tablet Take 150 mg by mouth daily       Carboxymethylcellulose Sodium 1 % GEL Apply 1 drop to eye At Bedtime       cholecalciferol 25 MCG (1000 UT) TABS Take 1 tablet by mouth daily       cyproheptadine (PERIACTIN) 4 MG tablet Take 3 tablets by mouth nightly as needed       finasteride (PROSCAR) 5 MG tablet Take 1 tablet by mouth daily       folic acid (FOLVITE) 1 MG tablet Take 1 tablet by mouth daily       gabapentin (NEURONTIN) 300 MG capsule Take 300 mg by mouth 3 times daily       mirabegron (MYRBETRIQ) 50 MG 24 hr tablet Take 1 tablet by mouth daily       mirtazapine (REMERON) 30 MG tablet Take 1.5 tablets by mouth daily       oxybutynin (DITROPAN) 5 MG tablet Take 5 mg by mouth 3 times daily       pantoprazole (PROTONIX) 40 MG EC tablet Take 1 tablet by mouth 2 times daily       prazosin (MINIPRESS) 2 MG capsule Take 4 mg by mouth daily       propylene glycol (SYSTANE BALANCE) 0.6 % SOLN ophthalmic solution Apply 1 drop to eye 4 times daily       senna (SENOKOT) 8.6 MG tablet Take 1 tablet by mouth as needed       sertraline (ZOLOFT) 100 MG tablet Take 1.5 tablets by mouth daily       simvastatin (ZOCOR) 40 MG tablet Take 0.5 tablets by mouth daily       tamsulosin (FLOMAX) 0.4 MG capsule Take 1 capsule by mouth daily       terazosin (HYTRIN) 10 MG capsule Take 10 mg by mouth At Bedtime       triamcinolone (KENALOG) 0.1 % external cream Apply 1 Film topically daily       vitamin B-12 (CYANOCOBALAMIN) 1000 MCG tablet Take 1 tablet by mouth daily         No Known Allergies     Family History   Problem Relation Age of Onset     Tremor Father         Social History     Socioeconomic History     Marital status:      Spouse name: Not on file     Number of children: Not on file     Years of education: Not on file     Highest education level: Not on file   Occupational History     Not  on file   Tobacco Use     Smoking status: Never Smoker     Smokeless tobacco: Never Used   Substance and Sexual Activity     Alcohol use: Not on file     Drug use: Not on file     Sexual activity: Not on file   Other Topics Concern     Not on file   Social History Narrative     Not on file     Social Determinants of Health     Financial Resource Strain: Not on file   Food Insecurity: Not on file   Transportation Needs: Not on file   Physical Activity: Not on file   Stress: Not on file   Social Connections: Not on file   Intimate Partner Violence: Not on file   Housing Stability: Not on file        PHYSICAL EXAM:  BP (!) 144/81   Pulse 73   Resp 16   Wt 91.2 kg (201 lb)   SpO2 94%    Exam videotaped.  Consent signed.  Gait: normal  Motor (Performance) Sub-Scale 4/13/2022   Assessment Time 1:31 PM   Medication On   DBS - Right Brain None   DBS - Left Brain None   Head 0   Face & Jaw 0   Voice 1   Outstretched - RIGHT 1   Outstretched - LEFT 1   Wingbeating - RIGHT 1   Wingbeating - LEFT 0   Kinetic - RIGHT 2   Kinetic - LEFT 2   Lower Limb - RIGHT 0   Lower Limb - LEFT 0   Lower Limb (Max) 0   Spiral - RIGHT 2   Spiral - LEFT 1.5   Handwriting 4   Dot approx - RIGHT 2   Dot approx - LEFT 1   Trunk (Standing) 0   Total Right 8   Total Left 5.5   Axial 1   TOTAL 18.5   Prior: 23 on 2/23/2022      IMAGING:  MRI brain w/wo contrast 3/17/2022 - personally reviewed: mild white matter disease, mild atrophy        ASSESSMENT/PLAN:  Mr. Butt is a 72 yo right handed man with ET who returns for motor testing as a part of DBS evaluation.  He has completed MRI brain and neurosurgery appointment.  Today we reviewed the risks of the procedure because he could not recall them.    - Neuropsych evaluation tomorrow 4/14/22  - Uses CPAP with oxygen 2L      Kaylynn Plummer MD  Movement Disorders Fellow      35 minutes spent on the date of the encounter doing chart review, history and exam, documentation and further activities as  noted above.

## 2022-04-13 ENCOUNTER — OFFICE VISIT (OUTPATIENT)
Dept: NEUROLOGY | Facility: CLINIC | Age: 72
End: 2022-04-13
Payer: COMMERCIAL

## 2022-04-13 VITALS
HEART RATE: 73 BPM | WEIGHT: 201 LBS | RESPIRATION RATE: 16 BRPM | DIASTOLIC BLOOD PRESSURE: 81 MMHG | OXYGEN SATURATION: 94 % | SYSTOLIC BLOOD PRESSURE: 144 MMHG

## 2022-04-13 DIAGNOSIS — G25.0 ESSENTIAL TREMOR: Primary | ICD-10-CM

## 2022-04-13 PROCEDURE — 99214 OFFICE O/P EST MOD 30 MIN: CPT | Performed by: STUDENT IN AN ORGANIZED HEALTH CARE EDUCATION/TRAINING PROGRAM

## 2022-04-13 ASSESSMENT — PAIN SCALES - GENERAL: PAINLEVEL: MILD PAIN (3)

## 2022-04-13 NOTE — NURSING NOTE
Chief Complaint   Patient presents with     RECHECK     Return motor testing        Javier Gomez

## 2022-04-14 ENCOUNTER — OFFICE VISIT (OUTPATIENT)
Dept: NEUROPSYCHOLOGY | Facility: CLINIC | Age: 72
End: 2022-04-14
Payer: COMMERCIAL

## 2022-04-14 ENCOUNTER — TELEPHONE (OUTPATIENT)
Dept: NEUROLOGY | Facility: CLINIC | Age: 72
End: 2022-04-14

## 2022-04-14 DIAGNOSIS — G25.0 ESSENTIAL TREMOR: ICD-10-CM

## 2022-04-14 DIAGNOSIS — F09 MENTAL DISORDER DUE TO GENERAL MEDICAL CONDITION: Primary | ICD-10-CM

## 2022-04-14 DIAGNOSIS — G25.0 ESSENTIAL TREMOR: Primary | ICD-10-CM

## 2022-04-14 PROCEDURE — 96133 NRPSYC TST EVAL PHYS/QHP EA: CPT

## 2022-04-14 PROCEDURE — 90791 PSYCH DIAGNOSTIC EVALUATION: CPT

## 2022-04-14 PROCEDURE — 96139 PSYCL/NRPSYC TST TECH EA: CPT

## 2022-04-14 PROCEDURE — 96132 NRPSYC TST EVAL PHYS/QHP 1ST: CPT

## 2022-04-14 PROCEDURE — 96138 PSYCL/NRPSYC TECH 1ST: CPT

## 2022-04-14 NOTE — TELEPHONE ENCOUNTER
----- Message from Kaylynn Plummer MD sent at 4/13/2022  2:03 PM CDT -----  Regarding: RE: Get [employer]  Yes it was her  ----- Message -----  From: Yusuf Leary MA  Sent: 4/13/2022   2:02 PM CDT  To: Kaylynn Plummer MD  Subject: RE: Get Well Loop                                I can help. Is it Svitlana? She's the one who lives in [place] and was at the first appt with you.  ----- Message -----  From: Kaylynn Plummer MD  Sent: 4/13/2022   1:52 PM CDT  To: Yusuf Leary MA, Shikha Grayson RN  Subject: Get Well Loop                                    Hi,    Mr. Butt's daughter was hoping one of you could answer some questions for her about the Get Well Loop.    Thanks,  Kaylynn

## 2022-04-14 NOTE — TELEPHONE ENCOUNTER
Called Svitlana back. Svitlana stated she would like to know if there is someone at the [facility] that can help the patient download Get well loop onto his smart phone. Svitlana was informed the writer is not aware of a . Svitlana was provided with [employer]'s IT department to help with this.     Svitlana asked how important is Get Well Loop for the patient as she feels it may overwhelm the patient since he is already dealing [facility] and [employer]. Svitlana was informed Get Well [employer] is tailored only to the work up process for Deep Brain Stimulation and that it's meant to enhance the patient's experience. Svitlana was reassured that it's meant to be educational and helpful but also that Svitlana would know the patient more. Svitlana was informed that if she feels this would only overwhelm the patient more than that is something we do not want for the patient.    Svitlana stated that if she certainly does not want to deprive the patient of any important know that Get Well Loop will provide.    Svitlana was informed the patient had not been signed up for [employer] yet because at the time the patient's appointments were scheduled with Svitlana, Svitlana had wanted Leslie to deal with the [employer] and Mychart, therefore the patient had not been signed up for [employer] yet. Svitlana was informed the patient was sent a message about signing up for Get well loop but the writer was never notified about whether or not the patient wanted to sign up for Get Well [employer]. Svitlana stated her sister, Leslie, informed her that she would like Svitlana to take over on everything (medical appointments and Mychart) and that Leslie probably thought Svitlana took care of the [employer] as well. Svitlana stated she would like the patient to be signed up for [employer].    Svitlana was informed the writer put in the request while the writer was on the phone with Svitlana, to sign the patient up for [employer].    Get Well [employer] -  Work up order was placed.    Svitlana stated  she will get the patient signed up for [employer] Well [employer] and if the patient does not wish to receive the messages on his phone then she will change his preference. Svitlana verbalized her understanding and had no further questions.

## 2022-04-14 NOTE — LETTER
4/14/2022      RE: Arley Butt  [address] [place] [id number]       NEUROPSYCHOLOGICAL EVALUATION    RELEVANT HISTORY AND REASON FOR REFERRAL    Arley Butt is a 71 year old, right handed, retired  with 11 years of formal education. Information was obtained via interview with the patient and his daughter, and review of his medical records. Records indicate a history of essential tremor with onset about 4 years ago in his right hand.  His tremor has progressively worsened and is now present in his left hand at times.  Medications have been ineffective, and the tremor is increasingly affecting his functioning.  He is now considering deep brain stimulation (DBS) surgery for management of his symptoms.  This neuropsychological evaluation was undertaken at the request of Kaylynn Plummer M.D., as part of the presurgical protocol, to assess cognitive functioning and mood, as they pertain to his ability to make well reasoned medical decisions and follow through treatment recommendations, and also to establish a neurocognitive baseline.    Records indicate that Mr. Butt has [facility] at least [facility] [place]. The report from his 7/30/2021 visit under the direction of Vinay Beckham, Ph.D., has been scanned into Epic. The findings were noted to reflect mild neurocognitive disorder, with performance demonstrating a slight decline as compared to 2017 testing in learning and memory, language, attention and concentration, and processing speed. He had deficits in processing speed, with variable scores ranging from average to below average, although these may have been affected by motor functioning. Scores on learning and memory measures ranged from below average to exceptionally low for verbal material with relatively better performance for visual information, falling in the low average range.    CLINICAL INTERVIEW FINDINGS    Upon interview, Mr. Butt and his  daughter indicated that his tremor began 4 or 5 years ago.  The right side is worse, but he does have a slight tremor on the left as well.  He is right-handed.  He would like to be able to do woodworking, right, and eat more easily.  If he has surgery, he would like to begin with the right side, but he thinks that he would like to have both sides addressed.  He has a fair understanding of the surgical procedure.  He thinks that he will be anxious about being awake during the surgery, but he is also curious and expects that he will do fine with this portion of the procedure.  He listed infection and internal bleeding as risks.  He feels happy that something could be done for his tremor, however.  His daughter stated that she likes the idea of getting him back to a more normal sense of living with socializing, tinkering, and going to restaurants.  He lives with his wife, and his family will be able to assist with his cares as necessary following the surgery.    Mr. Butt has noticed at most subtle difficulty with memory.  For instance, he may have trouble remembering a conversation that happened a long time ago, although his daughter has not noticed this problem.  He has not become lost in familiar places, has not been misplacing items, and is not repeating himself.  On occasion in the last year he has noticed word finding difficulties.  He stated that his attention and concentration have always been poor since childhood.  He denied difficulty with decision-making or organization.  He lives with his wife, and they share the management of their finances.  He denied any difficulty in this regard.  He manages his own medications and driving, apparently without difficulty.  He handles his personal cares independently.  He will occasionally require assistance buttoning his shirt sleeves because of arthritis.    Mr. Butt reported a history of anxiety and PTSD related to his  service.  He participated in  psychotherapy in the past.  He described his mood as good.  He does not feel depressed, hopeless, helpless, or guilty. He has acted out his dreams for years.  He has nightmares, which are usually realistic and about his time in [place].  About twice a week, his wife has to go to another bedroom for this reason.  He sleeps 6 or 7 hours a night.  He feels rested when he first wakes.  A few hours later he may doze off for 15 minutes, which is refreshing.  His appetite is low, although he has gained some weight.  His energy level is fair.  His interest level and motivation are good.  He denied visual or auditory hallucinations.  He has not had suicidal ideation, nor has he had any suicide attempts.    Mr. Butt stated that he quit drinking alcohol 8 or 10 years ago, because his wife was not happy with his drinking.  He was not in any chemical dependency treatment programs.  He had been drinking about a half a bottle of whiskey daily for 20 years before that.  He quit drinking alcohol before his tremor started, so he does not know if alcohol has any effect on his tremor.  He denied illicit drug use.  He quit smoking cigarettes in 1980.  He had smoked about half a pack of cigarettes a day before that, starting around the age of 17.    In school, math was difficult for Mr. Butt.  He was never held back any grades. He left high school during his senior year.  He took the test for the GED when he was in the service, but he never got a reply about it and does not know if he passed.  He worked as a  for 30 years, retiring in 2005.  He has been  for about 47 years.  He stated that he has 4 children, one of whom he just met for the first time.    Mr. Butt denied any history of seizure, stroke, or head injury resulting in loss of consciousness.  His balance has been off.  His daughter noted that some of his medications make him dizzy but that they are reluctant to change his medications.  He is working on  this with his physician.  He has not had unilateral weakness or numbness.  He experiences headaches at times.  He has pain in the joints of his fingers and in the bottoms of his feet and in his right hip.    PAST MEDICAL HISTORY: Medical records indicate a history of alcohol dependence, anxiety, benign prostatic hyperplasia, cataract, chronic pain, deep venous thrombosis, dysthymia, follicular lymphoma, gastroesophageal reflux disease, hypertension, hyperlipidemia, mild cognitive impairment, osteoarthritis, posttraumatic stress disorder, sensorineural hearing loss, tinnitus.    CURRENT MEDICATIONS:  Include bupropion, carboxymethylcellulose, cholecalciferol, cyproheptadine, finasteride, folic acid, gabapentin, mirabegron, mirtazapine, oxybutynin, pantoprazole, prazosin, propylene glycol, senna, sertraline, simvastatin, tamsulosin, terazosin, triamcinolone, vitamin B-12.    FAMILY MEDICAL HISTORY:  Significant for a father who may have had tremor.  He noted that both of his parents drank a lot of alcohol. Records note that his mother had a stroke.  He has no known family history of dementia.    BEHAVIORAL OBSERVATIONS    During the evaluation, Mr. Butt was pleasant, cooperative, and seemed to understand the instructions.  He was alert and oriented to person, place, and time.  Tremor was noted clinically in both hands.  Drawing tasks were notable for marked tremor.  Mood was euthymic.  Speech was fluent, with normal articulation, volume, and rate.  He presented his thoughts in a clear, logical manner.  Internal performance validity measures fell within normal limits.  The results are believed to accurately reflect his current level of functioning.    MEASURES ADMINISTERED    The following measures were administered by a trained psychometrist, under the direct supervision of a licensed psychologist.    Subtests of the Wechsler Adult Intelligence Scale-4; Reading subtest of the Wide Range Achievement Test-4; subtests  of the Wechsler Memory Scale-4 Lozano Verbal Learning Test-Revised, Form 1; Brief Visual Memory Test - Revised, Form 1; Palmer Naming Test; D-KEFS Verbal Fluency, Standard Form; Trail Making Test; Stroop; Wisconsin Card Sorting Test - 64; Guzman Judgement of Line Orientation Form V; Jam-Osterrieth Complex Figure; Clock Drawing; Dementia Rating Scale - 2 (DRS-2) Standard Form;  MoCA v. 7.1; Cuevas Depression Inventory-2 (BDI-2), HAM-D, HAM-A, Apathy Scale, RBDSQ; QUEST; ESS.     RESULTS AND INTERPRETATION    Overall intellectual functioning was estimated to fall in the average range, consistent with premorbid estimates based on single word reading abilities.  Performance on a screening measure of dementia was average (DRS-2 Total Score = 136/144).  His score on the MoCA was 25/30, with errors in serial subtraction and memory.  Written tasks on this measure were scored liberally due to marked tremor.    Confrontation naming was average for his age and level of education.  Ability to comprehend and articulate responses to complex social situations was low average.  Letter fluency and generative naming to category were average.  Switching fluency was below average.    Attention span was average for his age.  Divided attention was average on a timed, visuomotor sequencing task, and was low average on an untimed, auditory sequencing task.  Performance on a measure of distractibility was exceptionally low.    Basic visual perception, including matching lines and angles, was above average for his age and level of education.  Construction of a clock was notable for marked tremor, and he seemed to invert the hands.  Construction of a complex design fell within normal limits, but again was notable for significant tremor.  Nonverbal deductive reasoning was average.    Novel problem-solving, including the ability to generate strategies and solutions, fell within normal limits for his age and level of education.    Immediate  recall of verbal narrative material was below average, with exceptionally low recall following a 30-minute delay.  Recognition memory on this task was not significantly better than free recall.  On a multiple trial list learning task, immediate recall was exceptionally low, with exceptionally low recall following a 25-minute delay.  Recognition memory on this task was below average.  Immediate recall of visual material was below average, with retention of the learned material (80%) following within normal limits.  Recognition memory on this task included no errors.    On the BDI-2, a self-report questionnaire, he endorsed minimal to mild depressive symptomatology.  He endorsed apathy on a scale.  He also endorsed dream enactment behaviors.    IMPRESSIONS AND RECOMMENDATIONS    Arley Butt is a 71-year-old man with a history of essential tremor, who is being considered for DBS surgery for management of his tremor.  He has a history of PTSD associated with his  service, as well as anxiety.  He has undergone prior neuropsychological evaluations at outside clinics, most recently in July 2021, with results indicating mild neurocognitive disorder.  He was noted to have deficits in processing speed, which was variable and likely affected by motor functioning, and impairments in learning and memory particularly for verbal information.    Results of today's evaluation indicate prominent impairments in verbal learning and memory, while memory for visual information falls within normal limits.  He is distractible.  Performance otherwise falls within normal limits across cognitive domains, generally in the low average to average range.  He endorses minimal to mild depressive symptomatology, as well as apathy.    Compared to his evaluation in July 2021, verbal memory is generally stable, although he does not seem to benefit as much from cues.  Memory for visual information is stable or slightly improved.  Overall, he  had less variability within cognitive domains as compared to July 2021.    This pattern of performance is suggestive of dominant hemisphere mesial temporal lobe involvement.  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  There is not evidence of significant cognitive decline over the last year.  The etiology of his focal cognitive impairment is not entirely clear based on neuropsychological evaluation alone.  A cerebrovascular etiology should be ruled out.  While a neurodegenerative etiology cannot entirely be ruled out, the lack of progression over time would argue against this possibility.  Anxiety and other psychological symptoms could exacerbate this finding, but did not appear to be the sole contributors.    Mr. Butt appears to be capable of comprehending medical information and making well reasoned decisions for himself.  He has a good understanding of the surgical procedure and the risks involved.  There is a note from a movement disorders clinic visit the day before this evaluation indicating that he could not recall the risks of the procedure, which were reviewed with him.  Today, he was able to provide a detailed list of risks.  Given concerns about memory, it will be important to ensure that he has information provided to him in writing and reviewed more than once.  He has a strong support system in his family. In view of his psychiatric history, it seems reasonable to request a letter of support either from his psychiatrist, or from his primary care provider, which can address whether his mood has been stable recently.  He may be at somewhat greater risk for cognitive decline given his impairments in verbal memory, but overall he appears to be an adequate candidate for surgery from a neurocognitive perspective.    In terms of daily functioning,   Daquan's cognitive difficulties are not likely to interfere with his ability to actively participate in treatment or to manage his instrumental activities of daily living independently.  He may benefit from the use of written reminders or checklists.  He may find it helpful to use a smart phone or similar device for alarms and reminders; he is already doing this to assist with managing his medications.  He may be distractible, and when working on task, may find it helpful to work in an environment that is relatively free from distractions, such as noises or other interruptions.    Results may serve as an updated baseline of Mr. Butt's neurocognitive functioning.  Repeated neuropsychological evaluation in one year may help to determine if his cognitive difficulties are progressive.    Skylar Cabrera, Ph.D., ABPP  Licensed Psychologist, [id number]  Board Certified in Clinical Neuropsychology      Time spent: One unit of professional time, including interview (CPT 00840). 60 minutes (1 unit) neuropsychological testing evaluation by licensed and board-certified neuropsychologist, including integration of patient data, interpretation of standardized test results and clinical data, clinical decision-making, treatment planning, report, and interactive feedback to the patient, first hour (CPT 38008). 91 minutes (2 units) of neuropsychological testing evaluation by licensed and board-certified neuropsychologist, including integration of patient data, interpretation of standardized test results and clinical data, clinical decision-making, treatment planning, report, and interactive feedback to the patient, subsequent hours (CPT 59883). 30 minutes of neuropsychological test administration and scoring by technician, first 30 minutes (CPT 13056). 185 additional minutes (6 units) neuropsychological test administration and scoring by technician, subsequent 30 minutes (CPT 76983). ICD-10 Diagnoses: G25; F06.8.          Skylar SHIRLEY  Rick, PhD LP

## 2022-04-14 NOTE — PROGRESS NOTES
NEUROPSYCHOLOGICAL EVALUATION    RELEVANT HISTORY AND REASON FOR REFERRAL    Arley Butt is a 71 year old, right handed, retired  with 11 years of formal education. Information was obtained via interview with the patient and his daughter, and review of his medical records. Records indicate a history of essential tremor with onset about 4 years ago in his right hand.  His tremor has progressively worsened and is now present in his left hand at times.  Medications have been ineffective, and the tremor is increasingly affecting his functioning.  He is now considering deep brain stimulation (DBS) surgery for management of his symptoms.  This neuropsychological evaluation was undertaken at the request of Kaylynn Plummer M.D., as part of the presurgical protocol, to assess cognitive functioning and mood, as they pertain to his ability to make well reasoned medical decisions and follow through treatment recommendations, and also to establish a neurocognitive baseline.    Records indicate that Mr. Butt has [facility] [place]. The report from his 7/30/2021 visit under the direction of Vinay Beckham, Ph.D., has been scanned into Epic. The findings were noted to reflect mild neurocognitive disorder, with performance demonstrating a slight decline as compared to 2017 testing in learning and memory, language, attention and concentration, and processing speed. He had deficits in processing speed, with variable scores ranging from average to below average, although these may have been affected by motor functioning. Scores on learning and memory measures ranged from below average to exceptionally low for verbal material with relatively better performance for visual information, falling in the low average range.    CLINICAL INTERVIEW FINDINGS    Upon interview, Mr. Butt and his daughter indicated that his tremor began 4 or 5 years ago.  The right side is  worse, but he does have a slight tremor on the left as well.  He is right-handed.  He would like to be able to do woodworking, right, and eat more easily.  If he has surgery, he would like to begin with the right side, but he thinks that he would like to have both sides addressed.  He has a fair understanding of the surgical procedure.  He thinks that he will be anxious about being awake during the surgery, but he is also curious and expects that he will do fine with this portion of the procedure.  He listed infection and internal bleeding as risks.  He feels happy that something could be done for his tremor, however.  His daughter stated that she likes the idea of getting him back to a more normal sense of living with socializing, tinkering, and going to restaurants.  He lives with his wife, and his family will be able to assist with his cares as necessary following the surgery.    Mr. Butt has noticed at most subtle difficulty with memory.  For instance, he may have trouble remembering a conversation that happened a long time ago, although his daughter has not noticed this problem.  He has not become lost in familiar places, has not been misplacing items, and is not repeating himself.  On occasion in the last year he has noticed word finding difficulties.  He stated that his attention and concentration have always been poor since childhood.  He denied difficulty with decision-making or organization.  He lives with his wife, and they share the management of their finances.  He denied any difficulty in this regard.  He manages his own medications and driving, apparently without difficulty.  He handles his personal cares independently.  He will occasionally require assistance buttoning his shirt sleeves because of arthritis.    Mr. Butt reported a history of anxiety and PTSD related to his  service.  He participated in psychotherapy in the past.  He described his mood as good.  He does not feel depressed,  hopeless, helpless, or guilty. He has acted out his dreams for years.  He has nightmares, which are usually realistic and about his time in [place].  About twice a week, his wife has to go to another bedroom for this reason.  He sleeps 6 or 7 hours a night.  He feels rested when he first wakes.  A few hours later he may doze off for 15 minutes, which is refreshing.  His appetite is low, although he has gained some weight.  His energy level is fair.  His interest level and motivation are good.  He denied visual or auditory hallucinations.  He has not had suicidal ideation, nor has he had any suicide attempts.    Mr. Butt stated that he quit drinking alcohol 8 or 10 years ago, because his wife was not happy with his drinking.  He was not in any chemical dependency treatment programs.  He had been drinking about a half a bottle of whiskey daily for 20 years before that.  He quit drinking alcohol before his tremor started, so he does not know if alcohol has any effect on his tremor.  He denied illicit drug use.  He quit smoking cigarettes in 1980.  He had smoked about half a pack of cigarettes a day before that, starting around the age of 17.    In school, math was difficult for Mr. Butt.  He was never held back any grades. He left high school during his senior year.  He took the test for the GED when he was in the service, but he never got a reply about it and does not know if he passed.  He worked as a  for 30 years, retiring in 2005.  He has been  for about 47 years.  He stated that he has 4 children, one of whom he just met for the first time.    Mr. Butt denied any history of seizure, stroke, or head injury resulting in loss of consciousness.  His balance has been off.  His daughter noted that some of his medications make him dizzy but that they are reluctant to change his medications.  He is working on this with his physician.  He has not had unilateral weakness or numbness.  He  experiences headaches at times.  He has pain in the joints of his fingers and in the bottoms of his feet and in his right hip.    PAST MEDICAL HISTORY: Medical records indicate a history of alcohol dependence, anxiety, benign prostatic hyperplasia, cataract, chronic pain, deep venous thrombosis, dysthymia, follicular lymphoma, gastroesophageal reflux disease, hypertension, hyperlipidemia, mild cognitive impairment, osteoarthritis, posttraumatic stress disorder, sensorineural hearing loss, tinnitus.    CURRENT MEDICATIONS:  Include bupropion, carboxymethylcellulose, cholecalciferol, cyproheptadine, finasteride, folic acid, gabapentin, mirabegron, mirtazapine, oxybutynin, pantoprazole, prazosin, propylene glycol, senna, sertraline, simvastatin, tamsulosin, terazosin, triamcinolone, vitamin B-12.    FAMILY MEDICAL HISTORY:  Significant for a father who may have had tremor.  He noted that both of his parents drank a lot of alcohol. Records note that his mother had a stroke.  He has no known family history of dementia.    BEHAVIORAL OBSERVATIONS    During the evaluation, Mr. Butt was pleasant, cooperative, and seemed to understand the instructions.  He was alert and oriented to person, place, and time.  Tremor was noted clinically in both hands.  Drawing tasks were notable for marked tremor.  Mood was euthymic.  Speech was fluent, with normal articulation, volume, and rate.  He presented his thoughts in a clear, logical manner.  Internal performance validity measures fell within normal limits.  The results are believed to accurately reflect his current level of functioning.    MEASURES ADMINISTERED    The following measures were administered by a trained psychometrist, under the direct supervision of a licensed psychologist.    Subtests of the Wechsler Adult Intelligence Scale-4; Reading subtest of the Wide Range Achievement Test-4; subtests of the Wechsler Memory Scale-4 Lozano Verbal Learning Test-Revised, Form 1;  Brief Visual Memory Test - Revised, Form 1; [place] Naming Test; D-KEFS Verbal Fluency, Standard Form; Trail Making Test; Stroop; Wisconsin Card Sorting Test - 64; Guzman Judgement of Line Orientation Form V; Jam-Osterrieth Complex Figure; Clock Drawing; Dementia Rating Scale - 2 (DRS-2) Standard Form;  MoCA v. 7.1; Cuevas Depression Inventory-2 (BDI-2), HAM-D, HAM-A, Apathy Scale, RBDSQ; QUEST; ESS.     RESULTS AND INTERPRETATION    Overall intellectual functioning was estimated to fall in the average range, consistent with premorbid estimates based on single word reading abilities.  Performance on a screening measure of dementia was average (DRS-2 Total Score = 136/144).  His score on the MoCA was 25/30, with errors in serial subtraction and memory.  Written tasks on this measure were scored liberally due to marked tremor.    Confrontation naming was average for his age and level of education.  Ability to comprehend and articulate responses to complex social situations was low average.  Letter fluency and generative naming to category were average.  Switching fluency was below average.    Attention span was average for his age.  Divided attention was average on a timed, visuomotor sequencing task, and was low average on an untimed, auditory sequencing task.  Performance on a measure of distractibility was exceptionally low.    Basic visual perception, including matching lines and angles, was above average for his age and level of education.  Construction of a clock was notable for marked tremor, and he seemed to invert the hands.  Construction of a complex design fell within normal limits, but again was notable for significant tremor.  Nonverbal deductive reasoning was average.    Novel problem-solving, including the ability to generate strategies and solutions, fell within normal limits for his age and level of education.    Immediate recall of verbal narrative material was below average, with exceptionally low  recall following a 30-minute delay.  Recognition memory on this task was not significantly better than free recall.  On a multiple trial list learning task, immediate recall was exceptionally low, with exceptionally low recall following a 25-minute delay.  Recognition memory on this task was below average.  Immediate recall of visual material was below average, with retention of the learned material (80%) following within normal limits.  Recognition memory on this task included no errors.    On the BDI-2, a self-report questionnaire, he endorsed minimal to mild depressive symptomatology.  He endorsed apathy on a scale.  He also endorsed dream enactment behaviors.    IMPRESSIONS AND RECOMMENDATIONS    Arley Butt is a 71-year-old man with a history of essential tremor, who is being considered for DBS surgery for management of his tremor.  He has a history of PTSD associated with his  service, as well as anxiety.  He has undergone prior neuropsychological evaluations at outside clinics, most recently in July 2021, with results indicating mild neurocognitive disorder.  He was noted to have deficits in processing speed, which was variable and likely affected by motor functioning, and impairments in learning and memory particularly for verbal information.    Results of today's evaluation indicate prominent impairments in verbal learning and memory, while memory for visual information falls within normal limits.  He is distractible.  Performance otherwise falls within normal limits across cognitive domains, generally in the low average to average range.  He endorses minimal to mild depressive symptomatology, as well as apathy.    Compared to his evaluation in July 2021, verbal memory is generally stable, although he does not seem to benefit as much from cues.  Memory for visual information is stable or slightly improved.  Overall, he had less variability within cognitive domains as compared to July 2021.    This  pattern of performance is suggestive of dominant hemisphere mesial temporal lobe involvement.  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  There is not evidence of significant cognitive decline over the last year.  The etiology of his focal cognitive impairment is not entirely clear based on neuropsychological evaluation alone.  A cerebrovascular etiology should be ruled out.  While a neurodegenerative etiology cannot entirely be ruled out, the lack of progression over time would argue against this possibility.  Anxiety and other psychological symptoms could exacerbate this finding, but did not appear to be the sole contributors.    Mr. Butt appears to be capable of comprehending medical information and making well reasoned decisions for himself.  He has a good understanding of the surgical procedure and the risks involved.  There is a note from a movement disorders clinic visit the day before this evaluation indicating that he could not recall the risks of the procedure, which were reviewed with him.  Today, he was able to provide a detailed list of risks.  Given concerns about memory, it will be important to ensure that he has information provided to him in writing and reviewed more than once.  He has a strong support system in his family. In view of his psychiatric history, it seems reasonable to request a letter of support either from his psychiatrist, or from his primary care provider, which can address whether his mood has been stable recently.  He may be at somewhat greater risk for cognitive decline given his impairments in verbal memory, but overall he appears to be an adequate candidate for surgery from a neurocognitive perspective.    In terms of daily functioning, Mr. Butt's cognitive difficulties are not likely to interfere with his ability to  actively participate in treatment or to manage his instrumental activities of daily living independently.  He may benefit from the use of written reminders or checklists.  He may find it helpful to use a smart phone or similar device for alarms and reminders; he is already doing this to assist with managing his medications.  He may be distractible, and when working on task, may find it helpful to work in an environment that is relatively free from distractions, such as noises or other interruptions.    Results may serve as an updated baseline of Mr. Butt's neurocognitive functioning.  Repeated neuropsychological evaluation in one year may help to determine if his cognitive difficulties are progressive.    Skylar Cabrera, Ph.D., ABPP  Licensed Psychologist, LP [id number]  Board Certified in Clinical Neuropsychology      Time spent: One unit of professional time, including interview (CPT 98331). 60 minutes (1 unit) neuropsychological testing evaluation by licensed and board-certified neuropsychologist, including integration of patient data, interpretation of standardized test results and clinical data, clinical decision-making, treatment planning, report, and interactive feedback to the patient, first hour (CPT 45904). 91 minutes (2 units) of neuropsychological testing evaluation by licensed and board-certified neuropsychologist, including integration of patient data, interpretation of standardized test results and clinical data, clinical decision-making, treatment planning, report, and interactive feedback to the patient, subsequent hours (CPT 63878). 30 minutes of neuropsychological test administration and scoring by technician, first 30 minutes (CPT 58151). 185 additional minutes (6 units) neuropsychological test administration and scoring by technician, subsequent 30 minutes (CPT 11623). ICD-10 Diagnoses: G25; F06.8.

## 2022-04-15 NOTE — NURSING NOTE
The patient was seen for neuropsychological evaluation at the request of Dr. Kaylynn Plummer, for the purposes of diagnostic clarification and treatment planning. 215 minutes of test administration and scoring were provided by this writer, Andre Powell. Please see Dr. Skylar Cabrera's report for a full interpretation of the findings.

## 2022-04-20 NOTE — CONFIDENTIAL NOTE
Patient Arley Butt  [address] 22    MRN [id number]   Provider      50   Tech KB    Sex Male   Occupation     Education 11   Language English    Preferred Hand Right   Station OP    Age 71                 DEMENTIA RATING SCALE - 2    TEST FORM Standard     Raw MAS       Attention 37 13       Init/Psv 35 8       Construct 6 10       Concept 38 11       Memory 20 5       Total / 144 136 9                WAIS-IV          Raw SS       Comprehension 17 7       Letter Num Seq 15 7       Digit Span 23 9       Matrix Reasoning 13 11                WIDE RANGE ACHIEVEMENT TEST    TEST FORM 5     Raw SS %ile Grade Eq.     Reading 60 100 50 >12.9              WECHSLER MEMORY SCALE - IV         Raw MAS/SS       Info/Orientation 14        Logical Memory I 17 5       Logical Memory II 1 2       LM Recognition  11 <=2                BOSTON NAMING TEST         Score (Correct+Stim Cue)  53 MAS 9     # Correct Stimulus Cue  0 T 46     # Correct Phonemic Cue  4                D-KEFS VERBAL FLUENCY    TEST FORM Standard     Raw SS       Letter Fluency 31 9       Category Fluency 29 8       Switching Fluency             Total Correct 8 5       Switching Accuracy 8 7                CLOCK DRAWING         Command 2 /3 Rating   Borderline    Copy 2 /3 Rating  Borderline    TRAIL MAKING TEST          Seconds Errors MAS z     Trails A 40 0 10 0.09     Trails B 105 1 9 0.09              STROOP          Raw T Raw MAS     Word 53 20 53 5     Color  37 23 37 4     Color/Word 12 32 12 1              WIS[place] CARD SORTING TEST - 1 deck        # Categories 3 %ile >16      # Persev Error 12 T 53      Concept. Lev Resp. 40 T 53      FMS 0                 GUZMAN JUDGMENT OF LINE ORIENTATION   TEST FORM V    Raw Score 27 Raw Correction 3     MAS 14 Guzman %ile  86              HVLT-R    TEST FORM 1     Raw T       Trial 1 1        Trial 2 4        Trial 3 6        Total 1-3 11 25       Learning 5        Delay 2 25       Percent Retained 33%  23       True Positives 9        Discrimination Index 7 31       False Positives 2                 BRIEF VISUAL MEMORY TEST - REVISED   TEST FORM 1     Raw T       Trial 1 3        Trial 2 4        Trial 3 5        Total 1-3 12 34       Learning 2 41       Delay 4 33       Percent Retained 80% >16 %ile      Recognition Hits 6        Discrimination Index 6 >16 %ile      False Alarms 0                 PAXTON-O COMPLEX FIGURE TEST           Raw %ile       Copy  28 6-10%       Time to Copy 216 >16                MAGGIE COGNITVE ASSESSMENT (MOCA)  TEST FORM 7.3    Score 25 /30                BDI         Score 13        Interpretation Minimal                 APATHY SCALE         Score 16                          ESS         RDBSQ         QUEST

## 2022-04-22 ENCOUNTER — TELEPHONE (OUTPATIENT)
Dept: NEUROLOGY | Facility: CLINIC | Age: 72
End: 2022-04-22

## 2022-04-22 NOTE — TELEPHONE ENCOUNTER
Continue pantoprazole Svitlana called and left a message asking for the writing to call her back at [phone]. Svitlana stated on her message that she knows the group will be discussing the patient's DBS candidacy next Thursday. Svitlana asked that she be informed of the decision and not the patient. Svitlana also stated she has some questions regarding the noise during the surgery.

## 2022-04-22 NOTE — TELEPHONE ENCOUNTER
Called Svitlana back. Svitlana was informed that she will let Shikha Grayson, RN know that she would like to receive the decision once the patient is discussed. Svitlana was informed there is a consent on file for her, see Media tab. Svitlana was also informed that the patient will not be discussed next Thursday and that he will be discussed the following Thursday.    Svitlana stated she had questions about the surgery:    1) Is it possible for the patient to have ear plugs put in during the surgery and then be instructed to pull out the ear plugs? Svitlana stated when they listened to the sound of the recording in the videos, they had concerns about the mimicking of the sound. Svitlana stated the patient is concerned about developing possible anxiety during this time    2) Svitlana stated she also wants to know more information about other sounds such as the drilling during the surgery, for when the patient is awaken. Svitlana stated she is concerned about the patient since he is a  and the noises that occur during the surgery.    Svitlana was informed the writer will inform Lana, neurosurgery RN, to get back to her on this, to discuss her concerns further. Svitlana is aware Lana may not call her back until next week since she is not available today.     Please call Svitlana back at [phone].

## 2022-04-25 ENCOUNTER — TELEPHONE (OUTPATIENT)
Dept: NEUROLOGY | Facility: CLINIC | Age: 72
End: 2022-04-25
Payer: COMMERCIAL

## 2022-04-25 NOTE — TELEPHONE ENCOUNTER
Deep Brain Stimulation Surgery Surgical Candidacy Form    Referring Provider: Dr. Theo Ludwig   Patient Information: Lives in [place], [place]  Name: Arley Butt  YOB: 1950  Age: 71 year old  Height: ?  Weight: 201 lbs  BMI: ?  Blood Pressure: 144/81  Diagnosis: Essential tremor  Age of Onset of Symptoms: 67. Year of Onset of Symptoms: 2018.  Age of Diagnosis: 70. Year of Diagnosis: 2021.  Handedness: Right.  Side of Onset: Right upper extremity.   Disease Features: Tremor     Surgery Goals: Decrease tremor. to improve his writing and eating, to make it easier to do his hobbies again, woodworking, hammering nails, decrease frustrations  Treat right body first     Medications: As of 4/25/22:  Current Outpatient Medications   Medication Sig Dispense Refill     buPROPion (WELLBUTRIN SR) 150 MG 12 hr tablet Take 150 mg by mouth daily       Carboxymethylcellulose Sodium 1 % GEL Apply 1 drop to eye At Bedtime       cholecalciferol 25 MCG (1000 UT) TABS Take 1 tablet by mouth daily       cyproheptadine (PERIACTIN) 4 MG tablet Take 3 tablets by mouth nightly as needed       finasteride (PROSCAR) 5 MG tablet Take 1 tablet by mouth daily       folic acid (FOLVITE) 1 MG tablet Take 1 tablet by mouth daily       gabapentin (NEURONTIN) 300 MG capsule Take 300 mg by mouth 3 times daily       mirabegron (MYRBETRIQ) 50 MG 24 hr tablet Take 1 tablet by mouth daily       mirtazapine (REMERON) 30 MG tablet Take 1.5 tablets by mouth daily       NONFORMULARY Nocturnal CPAP with 2L oxygen       oxybutynin (DITROPAN) 5 MG tablet Take 5 mg by mouth 3 times daily       pantoprazole (PROTONIX) 40 MG EC tablet Take 1 tablet by mouth 2 times daily       prazosin (MINIPRESS) 2 MG capsule Take 4 mg by mouth daily       propylene glycol (SYSTANE BALANCE) 0.6 % SOLN ophthalmic solution Apply 1 drop to eye 4 times daily       senna (SENOKOT) 8.6 MG tablet Take 1 tablet by mouth as  needed       sertraline (ZOLOFT) 100 MG tablet Take 1.5 tablets by mouth daily       simvastatin (ZOCOR) 40 MG tablet Take 0.5 tablets by mouth daily       tamsulosin (FLOMAX) 0.4 MG capsule Take 1 capsule by mouth daily       terazosin (HYTRIN) 10 MG capsule Take 10 mg by mouth At Bedtime       triamcinolone (KENALOG) 0.1 % external cream Apply 1 Film topically daily       vitamin B-12 (CYANOCOBALAMIN) 1000 MCG tablet Take 1 tablet by mouth daily       Related Medications           Gabapentin 300mg 1 1 2 2      Motor Assessment:  TETRAS ADL subscale: 26  TETRAS performance subscale: 18.5     Neuropsychological Evaluation:    Cognitive Issues: Results indicate prominent impairments in verbal learning and memory, while memory for visual information falls within normal limits. He is distractible. Performance otherwise falls within normal limits across cognitive domains, generally in the low average to average range. Memory is generally stable compared to July 2021. He has less variability within cognitive domains compared to July 2021. 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    Psychiatric Issues: He has a history of PTSD (associated with  service) and anxiety. He endorses mild depressive symptomatology and apathy. He has a good understanding of the surgical procedure and the risks. Given his psychiatric history, a letter of support addressing his mood/emotional stability could be requested from his psychiatrist or PCP. He may be at somewhat greater risk for cognitive decline given his impairments in verbal memory, but overall he appears to be an adequate candidate for surgery from a neurocognitive perspective.    Depression: Mild depressive symptoms.(anxiety)    Cognitive Function: Mild memory difficulties or frontal deficits.    Psychosis: No hallucinations.     MRI Date: 3/17/2022    Impression: mild white matter  disease     PMH: -  Past Medical History:   Diagnosis Date     Alcohol dependence (H)      Anemia      Anxiety      BPH (benign prostatic hyperplasia)      Cataract      Chronic pain      DVT (deep venous thrombosis) (H)      Dysphagia      Dysthymia      Erosive gastritis      Esophagitis      Follicular lymphoma (H)      Foot sprain      Gastroesophageal reflux disease with esophagitis      HTN (hypertension)      Hyperlipidemia      Impacted cerumen      MCI (mild cognitive impairment)      Osteoarthritis      Presbyopia      PTSD (post-traumatic stress disorder)      Sensorineural hearing loss, bilateral      Tinnitus, bilateral      Urinary frequency      PSH  Past Surgical History:   Procedure Laterality Date     ANKLE SURGERY Right      BACK SURGERY      lower back, herniated disc     CYSTECTOMY      pilonidal     VASECTOMY       Soc Hx: , retired     Family Hx of Movement Disorders: Father had tremor    Consult Moon Plummer/Shanna   Patient discussed at conference on: 5/5/22     DBS Meeting Notes/Further Work-up Needed: -    Authorization to discuss Protected Health Information:  Yes, Meghan (daughters)  Healthcare Directive:  None on file  Mychart use:  Uses reliably  Get Well Loop sign up: Yes  If yes, was the patient engaged? Yes    From 5/5/22 Consensus     1. Candidate for LVIM wait and see  2. If he wants nonrechargeable - [employer] but may need 2 IPGs Dr. Plummer to speak with him Monday 5/9  2. Need letter of support from psychiatrist or PCP (if that person prescribes antidep)  3. Get repeat neuropsych evaluation - not full battery - if and when considering 2nd side  4. Research?   5. GET WELL LOOP - yes  -------------------------------  Spoke to daughter Svitlana. Patient left for vacation today. Svitlana is the main contact for this patient's care. Other [facility]. Patient will be gone today until 5/14. Svitlana asks that Dr. Plummer contact her (she will be with  "patient) on May 16th - call around 1:00-2:00.     Writer explained that we will need a letter of support before surgery scheduling can take place. Surgeries are booked out until early Fall right now. Also explained about needing another neuropsych before 2nd side would be considered. She stated that the patient will want to be a part of the GET WELL LOOP but not sure about research.     Writer explained there are Deep Brain Stimulation device options that Dr. Plummer will be calling to discuss with them. She stated that Dr. Otero spoke of implanting an extra extension wire in case patient wanted to get other side done in the future. Writer stated that that is certainly an option with [employer] and [employer] but it is [employer] that currently offers remote programming.     [employer] message sent with details and HEALTH CARE DIRECTIVE.     Questions for Lana: \"Can patient wear ear plugs during surgery? Will the noise be going on when he wakes up?\" Writer will send to Lana for follow up.       "

## 2022-05-06 ENCOUNTER — TELEPHONE (OUTPATIENT)
Dept: NEUROLOGY | Facility: CLINIC | Age: 72
End: 2022-05-06
Payer: COMMERCIAL

## 2022-05-06 NOTE — TELEPHONE ENCOUNTER
"From 5/5/22 Consensus    1. Candidate for LVIM wait and see  2. If he wants nonrechargeable - [employer] but may need 2 IPGs Dr. Plummer to speak with him Monday 5/9  2. Need letter of support from psychiatrist or PCP (if that person prescribes antidep)  3. Get repeat neuropsych evaluation - not full battery - if and when considering 2nd side  4. Research?   5. GET WELL LOOP - yes    Authorization to discuss Protected Health Information:  Yes, Svitlana and Leslie (daughters)  Healthcare Directive:  None on file  Sinch use:  Uses reliably  ------------------------  Spoke to daughter Svitlana. Patient left for vacation today. Svitlana is the main contact for this patient's care. Other [facility]. Patient will be gone today until 5/14. Svitlana asks that Dr. Plummer contact her (she will be with patient) on May 16th - call around 1:00-2:00.    Writer explained that we will need a letter of support before surgery scheduling can take place. Surgeries are booked out until early Fall right now. Also explained about needing another neuropsych before 2nd side would be considered. She stated that the patient will want to be a part of the GET WELL LOOP but not sure about research.    Writer explained there are Deep Brain Stimulation device options that Dr. Plummer will be calling to discuss with them. She stated that Dr. Otero spoke of implanting an extra extension wire in case patient wanted to get other side done in the future. Writer stated that that is certainly an option with [employer] and [employer] but it is [employer] that currently offers remote programming.    [employer] message sent with details and HEALTH CARE DIRECTIVE.    Questions for Lana: \"Can patient wear ear plugs during surgery? Will the noise be going on when he wakes up?\" Writer will send to Lana for follow up.        "

## 2022-05-16 NOTE — TELEPHONE ENCOUNTER
They don't have internet at the house so remote programming would need to occur at her daughter's house.    We discussed rechargeable vs non-rechargeable.  They are interested in rechargeable with one IPG.  Will move forward with Hepa Wash.    They are awaiting paperwork in the mail from our division and have some questions about the letter of support.  They would like him to be warned in the OR before he would he would hear any BELTRAN noise.  They have concerns about him being startled and whether he could wear ear plug.  They want to know about when he needs pre-op evaluation from his PCP and what should be done about his other medications on the day of surgery.

## 2022-05-18 NOTE — TELEPHONE ENCOUNTER
"Spoke to Svitlana. She had not seen the [employer] message writer sent on 5/6 that has all the information she is asking about. Writer clarified that the [facility] provider \"writes\" the letter and sends to me.    Day of surgery medication questions. Writer clarified that Lana Rodriguez, RN, will answer those questions closer to the surgical date.    Writer sent the concern about loud noises to Dr. Otero and Lana Rodriguez. Svitlana is concerned that the BELTRAN recording sounds sound a bit like machine gun fire. Patient was in [place].  "

## 2022-05-23 ENCOUNTER — TELEPHONE (OUTPATIENT)
Dept: NEUROSURGERY | Facility: CLINIC | Age: 72
End: 2022-05-23
Payer: COMMERCIAL

## 2022-05-23 NOTE — TELEPHONE ENCOUNTER
Left VM for pt's daughter Svitlana and pt. Received message from RN Shikha Grayson re questions about DBS microelectrode recording, medications, timing of preop appt, etc. Let them know I am happy to discuss any questions they have. Left my direct number.

## 2022-06-14 ENCOUNTER — TRANSFERRED RECORDS (OUTPATIENT)
Dept: HEALTH INFORMATION MANAGEMENT | Facility: CLINIC | Age: 72
End: 2022-06-14

## 2022-06-20 ENCOUNTER — TELEPHONE (OUTPATIENT)
Dept: NEUROLOGY | Facility: CLINIC | Age: 72
End: 2022-06-20
Payer: COMMERCIAL

## 2022-06-20 ENCOUNTER — MEDICAL CORRESPONDENCE (OUTPATIENT)
Dept: HEALTH INFORMATION MANAGEMENT | Facility: CLINIC | Age: 72
End: 2022-06-20
Payer: COMMERCIAL

## 2022-06-20 NOTE — TELEPHONE ENCOUNTER
Called pt's daughter, as none of the clinic coordinators have notified me that there is a letter. Svitlana said that she just handed it to one of the coordinators in the 3H pod. I was able to get the letter and will scan it into pt's chart. We discussed next steps re scheduling and that Dr. Otero is scheduling into late September/early October.     No further questions at this time.

## 2022-06-20 NOTE — TELEPHONE ENCOUNTER
Svitlana, the patient's daughter, left a message for the writer to give her a call back.    Called and spoke to Svitlana. Svitlana stated she will be dropping off the letter from Dr. Patterson to the [facility] today since she will close by. Svitlana stated she wanted to make sure the writer was aware. Svitlana was informed when she drops off the letter, she should say this needs to go to Dr. Otero in Neurosurgery since the letter is needed prior to scheduling surgery. Svitlana verbalized her understanding and had no further questions.

## 2022-06-23 ENCOUNTER — MEDICAL CORRESPONDENCE (OUTPATIENT)
Dept: HEALTH INFORMATION MANAGEMENT | Facility: CLINIC | Age: 72
End: 2022-06-23

## 2022-06-28 ENCOUNTER — TELEPHONE (OUTPATIENT)
Dept: NEUROLOGY | Facility: CLINIC | Age: 72
End: 2022-06-28

## 2022-06-28 NOTE — TELEPHONE ENCOUNTER
Svitlana stated she needs a report or something in writing that says that the patient's symptoms are service related, to continue to get disability coverage through [employer]. Svitlana stated she is not sure if they are looking for documents or a letter. Svitlana stated they were told by the [facility] that the letter should be coming from [facility] since the patient is seeing [facility] for Deep Brain Stimulation surgery. Svitlana stated this is because the patient's current neurologist retired. Svitlana stated she would like either Dr. Plummer or Dr. Otero to address this.    Spoke to Srinivas at [facility] services. Srinivas stated the patient was identified as a [place]  who was exposed to Agent Orange and that his Parkinson's-like symptoms are a known cause of that exposure.    Srinivas stated they need a statement with a cover letter from a treating neurologist/neurosurgeon whom is treating patient, stating that the patient's condition falls under Parkinson's Disease or Parkinson's Disease-like symptoms. Srinivas said the letter should answer the question, would the patient's tremors/neurological symptoms be related to the patient's Parkinson's Disease? Srinivas stated only the statement is need and nothing else.    Please have the statement faxed to Srinivas at the fax number listed below.    Srinivas Perez [facility] services  Address: [address] [place] [address],   [place] [id number]   Phone: [phone]  Fax [phone]

## 2022-07-12 ENCOUNTER — TELEPHONE (OUTPATIENT)
Dept: NEUROLOGY | Facility: CLINIC | Age: 72
End: 2022-07-12

## 2022-07-12 NOTE — TELEPHONE ENCOUNTER
Svitlana, patient's daughter called and stated she has the patient's other daughter, Leslie, and Velia, who is a nurse on the phone. Svitlana handed the phone to Velia. Velia stated she is not sure if the pateint has transferred care to [facility] because the patient has been having dizzy spells and he had a fall on Friday. Velia stated she has left 2 message for the [facility] on 6/28 and 7/8. Velia was informed we are only seeing the patient for the DBS consult and not transfer of care for neurology. The writer confirmed this by reading the referral from the [facility]. Velia verbalized her understanding of this.    Svitlana asked if there was any news on when the patient will get scheduled for surgery. Svitlana was informed a message will be sent to Lana Rodriguez about this.    Svitlana had no further questions and thanked the writer for her time.

## 2022-07-14 DIAGNOSIS — G25.0 ESSENTIAL TREMOR: Primary | ICD-10-CM

## 2022-07-27 ENCOUNTER — TELEPHONE (OUTPATIENT)
Dept: NEUROSURGERY | Facility: CLINIC | Age: 72
End: 2022-07-27

## 2022-07-27 NOTE — TELEPHONE ENCOUNTER
I called patient's daughter (Svitlana) in regards to surgery with Dr. Otero, and the dates that were given were 9/26 and 10/3. Patients daughter stated that those should work, but wanted to make a few phone calls to family and than would call back to confirm.      Cornelio Palmer on 7/27/2022 at 8:35 AM

## 2022-08-04 ENCOUNTER — TRANSFERRED RECORDS (OUTPATIENT)
Dept: HEALTH INFORMATION MANAGEMENT | Facility: CLINIC | Age: 72
End: 2022-08-04

## 2022-08-09 ENCOUNTER — TELEPHONE (OUTPATIENT)
Dept: NEUROSURGERY | Facility: CLINIC | Age: 72
End: 2022-08-09

## 2022-08-09 DIAGNOSIS — G25.0 ESSENTIAL TREMOR: Primary | ICD-10-CM

## 2022-08-09 NOTE — TELEPHONE ENCOUNTER
I called the patient in regards to scheduling surgery with Dr. Otero. Patient has a covid test scheduled at [facility] and pre op has been taken care of with PAC in person. Patient is aware that the nursing team will be reaching out within the next few days. I did tell patient that a nurse will reach out within 2-3 days prior to surgery with arrival time and instructions.    Surgeon: Dr. Otero  Date of Surgery: 9/26/2022 and 10/03/2022  Location of surgery: [place] OR  Pre-Op H&P: 9/12/2022  Post-Op Appt Date: 10/18/2022   Imaging needed:  No  Discussed COVID-19 testing:  Yes  Pre-cert/Authorization completed:  Yes  Patient aware that pre-op RN will call 2-3 days prior to surgery with arrival time and instructions Yes  Packet sent out: No 08/09/22  Patient was instructed to review packet and call back with any questions or concerns.         Cornelio Palmer on 8/9/2022 at 11:18 AM

## 2022-08-10 NOTE — TELEPHONE ENCOUNTER
FUTURE VISIT INFORMATION      SURGERY INFORMATION:    Date: 22    Location: [facility] or    Surgeon:  Manuel Otero MD    Anesthesia Type:  MAC with Local    Procedure: stealth assisted Left side deep brain stimulator placement, phase I, placement of left side deep brain stimulator electrode, target left ventral intermediate nucleus of the thalamus, with microelectrode recording    RECORDS REQUESTED FROM:       Primary Care Provider: Jocelyn Reyna APRN [facility]    Most recent EKG+ Tracin/3/19- [facility]    Most recent Cardiac Stress Test: 21- [facility]    Most recent Sleep Study: 10/17/17- [facility]

## 2022-08-23 ENCOUNTER — TELEPHONE (OUTPATIENT)
Dept: NEUROLOGY | Facility: CLINIC | Age: 72
End: 2022-08-23

## 2022-08-23 ENCOUNTER — DOCUMENTATION ONLY (OUTPATIENT)
Dept: NEUROLOGY | Facility: CLINIC | Age: 72
End: 2022-08-23

## 2022-08-23 NOTE — TELEPHONE ENCOUNTER
See the telephone encounter on 6/28/22. Svitlana called to state the [facility] has not received the letter that Svitlana had requested for. Will have the letter re-faxed again to [phone].

## 2022-08-23 NOTE — TELEPHONE ENCOUNTER
Gloria from [facility] stated they have not received this letter yet. Informed Gloria that the letter has not been faxed yet. Please fax the letter Dr. Plummer wrote on 6/29/22 to Gloria at [phone] at [facility] services.

## 2022-08-25 ENCOUNTER — TELEPHONE (OUTPATIENT)
Dept: NEUROLOGY | Facility: CLINIC | Age: 72
End: 2022-08-25

## 2022-08-25 DIAGNOSIS — G25.0 ESSENTIAL TREMOR: Primary | ICD-10-CM

## 2022-08-25 NOTE — TELEPHONE ENCOUNTER
CT head ordered and scheduled x  Neuropsych order placed for 1 year follow up x  Letter composed x  Datebase updated x  Appointment held x  Get Well Loop ordered x    The patient's daughter, Svitlana, was informed of the patient's initial programming appointments on 10/24/22 and was reminded to bring their patient . Svitlana was also informed the patient will be repeating the neuropsychological evaluation, 1 year after his DBS surgery.

## 2022-08-30 ENCOUNTER — TELEPHONE (OUTPATIENT)
Dept: NEUROSURGERY | Facility: CLINIC | Age: 72
End: 2022-08-30

## 2022-08-30 NOTE — TELEPHONE ENCOUNTER
[facility] Call Center    Phone Message    May a detailed message be left on voicemail: yes     Reason for Call: Other: Brandy with the [place] [facility] called regarding a release for authorization form that is needed prior to the procedure. Please fax to [phone].      Action Taken: Other: Neurosurgery    Travel Screening: Not Applicable

## 2022-08-30 NOTE — TELEPHONE ENCOUNTER
Writer routed to Bruna Guillory.   [employer] assist with authorization for surgery.    Steff Wynne LPN  Neurosurgery

## 2022-09-02 NOTE — TELEPHONE ENCOUNTER
Spoke to [facility] representative to get clarification on what form is needed. The form is called VARSS and it can be found online. Pt's CC authorization  on 22. Form should be signed by the surgeon and form and supporting documentation should be faxed to [phone].

## 2022-09-06 ENCOUNTER — TELEPHONE (OUTPATIENT)
Dept: NEUROSURGERY | Facility: CLINIC | Age: 72
End: 2022-09-06

## 2022-09-06 NOTE — TELEPHONE ENCOUNTER
[facility] Provider Request for Service form and supporting neurosurgery and neurology notes faxed to [facility] at [phone].

## 2022-09-06 NOTE — TELEPHONE ENCOUNTER
Pt's daughter Svitlana called to give me phone numbers for [facility]'s neurology clinic at the [facility]:     Dr. Ny [phone], ext [phone]  JOSSELYN Baker: [phone], ext [phone]  Sheryl Simmons): [phone], ext [phone] (Svitlana was not exactly sure of Sheryl's role)

## 2022-09-09 RX ORDER — GABAPENTIN 600 MG/1
TABLET ORAL
COMMUNITY
End: 2023-04-03

## 2022-09-09 RX ORDER — ACETAMINOPHEN 500 MG
500-1000 TABLET ORAL EVERY 8 HOURS PRN
Status: ON HOLD | COMMUNITY
End: 2023-04-11

## 2022-09-09 RX ORDER — PRAZOSIN HYDROCHLORIDE 5 MG/1
10 CAPSULE ORAL AT BEDTIME
COMMUNITY

## 2022-09-09 NOTE — PHARMACY - PREOPERATIVE ASSESSMENT CENTER
Preoperative Assessment Center Medication History Note    Medication history completed on September 9, 2022 by this writer. See Epic admission navigator for prior to admission medications. Operating room staff will still need to confirm medications and last dose information on day of surgery.     Medication history interview sources  Patient interview: Yes, also with daughter Svitlana who read his updated med list off.   Care Everywhere records: Yes  Surescripts pharmacy refill records: No  Other (if applicable):      Changes made to PTA medication list  Added: none  Deleted: cholecalciferol, B12,  Wellbutrin, oxybutinyn, mirabegron, terazosin,   Changed: cyproheptadine dose/sig, mirtazapine dose, prazosin dose/sig, senna, gabapentin dose/sig.     Additional medication history information (including reliability of information, actions taken by pharmacist):    -- No recent (within 30 days) course of antibiotics  -- No recent (within 30 days) course of systemic steroids  -- Reports not being on blood thinning medications    -- Declines being on any other prescription or over-the-counter medications  Prior to Admission medications    Medication Sig Last Dose Taking? Auth Provider Long Term End Date   acetaminophen (TYLENOL) 500 MG tablet Take 500-1,000 mg by mouth every 8 hours as needed for mild pain Taking Yes Unknown, Entered By History     Carboxymethylcellulose Sodium 1 % GEL Apply 1 drop to eye nightly as needed Taking Yes Reported, Patient     cyproheptadine (PERIACTIN) 4 MG tablet Take 12 mg by mouth At Bedtime Taking Yes Reported, Patient     finasteride (PROSCAR) 5 MG tablet Take 1 tablet by mouth daily Taking Yes Reported, Patient     folic acid (FOLVITE) 1 MG tablet Take 1 tablet by mouth daily Taking Yes Reported, Patient     gabapentin (NEURONTIN) 300 MG capsule Takes 300 mg capsule and 600 mg tablet (see other order as well)   Dosing as follows:    AM:  300 mg + 600 mg = 900 mg total  Afternoon: 300 mg + 600  mg = 900 mg total  Evenin mg x2 + 600 mg = 1200 mg total  Bedtime: 300 mg x3 + 600 mg = 1200 mg total Taking Yes Reported, Patient Yes    gabapentin (NEURONTIN) 600 MG tablet Takes 300 mg capsule and 600 mg tablet (see other order as well)   Dosing as follows:    AM:  300 mg + 600 mg = 900 mg total  Afternoon: 300 mg + 600 mg = 900 mg total  Evenin mg x2 + 600 mg = 1200 mg total  Bedtime: 300 mg x3 + 600 mg = 1200 mg total Taking Yes Unknown, Entered By History Yes    mirtazapine (REMERON) 30 MG tablet Take 15 mg by mouth daily Taking Yes Reported, Patient Yes    NONFORMULARY Nocturnal CPAP with 2L oxygen Taking Yes Reported, Patient     pantoprazole (PROTONIX) 40 MG EC tablet Take 40 mg by mouth 2 times daily Taking Yes Reported, Patient     prazosin (MINIPRESS) 5 MG capsule Take 10 mg by mouth At Bedtime Taking Yes Unknown, Entered By History Yes    propylene glycol (SYSTANE BALANCE) 0.6 % SOLN ophthalmic solution Apply 1 drop to eye 4 times daily as needed Taking Yes Reported, Patient     senna (SENOKOT) 8.6 MG tablet Take 1 tablet by mouth daily Taking Yes Reported, Patient     sertraline (ZOLOFT) 100 MG tablet Take 150 mg by mouth daily Taking Yes Reported, Patient Yes    simvastatin (ZOCOR) 40 MG tablet Take 0.5 tablets by mouth At Bedtime Taking Yes Reported, Patient Yes    tamsulosin (FLOMAX) 0.4 MG capsule Take 1 capsule by mouth daily Taking Yes Reported, Patient     triamcinolone (KENALOG) 0.1 % external cream Apply 1 Film topically daily as needed  Patient not taking: Reported on 2022 Not Taking  Reported, Patient          Medication history completed by: Roman Lam, [facility]

## 2022-09-11 LAB
ABO/RH(D): NORMAL
ANTIBODY SCREEN: NEGATIVE
SPECIMEN EXPIRATION DATE: NORMAL

## 2022-09-12 ENCOUNTER — ANESTHESIA EVENT (OUTPATIENT)
Dept: SURGERY | Facility: CLINIC | Age: 72
DRG: 027 | End: 2022-09-12
Payer: COMMERCIAL

## 2022-09-12 ENCOUNTER — PRE VISIT (OUTPATIENT)
Facility: CLINIC | Age: 72
End: 2022-09-12

## 2022-09-12 ENCOUNTER — TELEPHONE (OUTPATIENT)
Dept: NEUROSURGERY | Facility: CLINIC | Age: 72
End: 2022-09-12

## 2022-09-12 ENCOUNTER — OFFICE VISIT (OUTPATIENT)
Dept: SURGERY | Facility: CLINIC | Age: 72
End: 2022-09-12
Payer: COMMERCIAL

## 2022-09-12 ENCOUNTER — LAB (OUTPATIENT)
Dept: LAB | Facility: CLINIC | Age: 72
End: 2022-09-12
Payer: COMMERCIAL

## 2022-09-12 VITALS
HEIGHT: 66 IN | WEIGHT: 199.7 LBS | BODY MASS INDEX: 32.09 KG/M2 | TEMPERATURE: 97.7 F | SYSTOLIC BLOOD PRESSURE: 137 MMHG | HEART RATE: 68 BPM | OXYGEN SATURATION: 95 % | DIASTOLIC BLOOD PRESSURE: 87 MMHG | RESPIRATION RATE: 16 BRPM

## 2022-09-12 DIAGNOSIS — Z01.818 PRE-OPERATIVE EXAM: Primary | ICD-10-CM

## 2022-09-12 DIAGNOSIS — G25.0 ESSENTIAL TREMOR: ICD-10-CM

## 2022-09-12 DIAGNOSIS — Z01.818 PRE-OPERATIVE EXAM: ICD-10-CM

## 2022-09-12 LAB
ANION GAP SERPL CALCULATED.3IONS-SCNC: 13 MMOL/L (ref 7–15)
BUN SERPL-MCNC: 21.1 MG/DL (ref 8–23)
CALCIUM SERPL-MCNC: 9.5 MG/DL (ref 8.8–10.2)
CHLORIDE SERPL-SCNC: 107 MMOL/L (ref 98–107)
CREAT SERPL-MCNC: 1.11 MG/DL (ref 0.67–1.17)
DEPRECATED HCO3 PLAS-SCNC: 23 MMOL/L (ref 22–29)
ERYTHROCYTE [DISTWIDTH] IN BLOOD BY AUTOMATED COUNT: 13.6 % (ref 10–15)
GFR SERPL CREATININE-BSD FRML MDRD: 71 ML/MIN/1.73M2
GLUCOSE SERPL-MCNC: 111 MG/DL (ref 70–99)
HCT VFR BLD AUTO: 37.8 % (ref 40–53)
HGB BLD-MCNC: 12.7 G/DL (ref 13.3–17.7)
MCH RBC QN AUTO: 31.1 PG (ref 26.5–33)
MCHC RBC AUTO-ENTMCNC: 33.6 G/DL (ref 31.5–36.5)
MCV RBC AUTO: 92 FL (ref 78–100)
PLATELET # BLD AUTO: 160 10E3/UL (ref 150–450)
POTASSIUM SERPL-SCNC: 4.4 MMOL/L (ref 3.4–5.3)
RBC # BLD AUTO: 4.09 10E6/UL (ref 4.4–5.9)
SODIUM SERPL-SCNC: 143 MMOL/L (ref 136–145)
WBC # BLD AUTO: 5.5 10E3/UL (ref 4–11)

## 2022-09-12 PROCEDURE — 85027 COMPLETE CBC AUTOMATED: CPT | Performed by: PATHOLOGY

## 2022-09-12 PROCEDURE — 86850 RBC ANTIBODY SCREEN: CPT | Mod: 90 | Performed by: PATHOLOGY

## 2022-09-12 PROCEDURE — 36415 COLL VENOUS BLD VENIPUNCTURE: CPT | Performed by: PATHOLOGY

## 2022-09-12 PROCEDURE — 86900 BLOOD TYPING SEROLOGIC ABO: CPT | Mod: 90 | Performed by: PATHOLOGY

## 2022-09-12 PROCEDURE — 99000 SPECIMEN HANDLING OFFICE-LAB: CPT | Performed by: PATHOLOGY

## 2022-09-12 PROCEDURE — 80048 BASIC METABOLIC PNL TOTAL CA: CPT | Performed by: PATHOLOGY

## 2022-09-12 PROCEDURE — 86901 BLOOD TYPING SEROLOGIC RH(D): CPT | Mod: 90 | Performed by: PATHOLOGY

## 2022-09-12 ASSESSMENT — LIFESTYLE VARIABLES: TOBACCO_USE: 1

## 2022-09-12 ASSESSMENT — PAIN SCALES - GENERAL: PAINLEVEL: NO PAIN (0)

## 2022-09-12 NOTE — H&P
Pre-Operative H & P     CC:  Preoperative exam to assess for increased cardiopulmonary risk while undergoing surgery and anesthesia.    Date of Encounter: 9/12/2022  Primary Care Physician:  Jocelyn Reyna     Reason for visit: pre-op evaluation. Essential tremor    HPI  Arley Butt is a 72 year old male who presents for pre-operative H & P in preparation for  Procedure Information     Case: [id number] Date/Time: 09/26/22 0730    Procedure: stealth assisted Left side deep brain stimulator placement, phase I, placement of left side deep brain stimulator electrode, target left ventral intermediate nucleus of the thalamus, with microelectrode recording (Left Head)    Anesthesia type: MAC with Local    Diagnosis: Essential tremor [G25.0]    Pre-op diagnosis: Essential tremor [G25.0]    Location:  OR [address] [place] [address] OR    Providers: Manuel Traore MD        Second phase of surgery is placement of battery under general anesthesia, same day surgery, with Dr. Manuel Traore, on 10/3/22, at [facility].       HISTORY OF PRESENT ILLNESS  Mr Butt is a 71 y/o gentleman with essential tremor referred for neurosurgical consideration for DBS from the [facility].   He has had bilateral hand tremors, R>L for a few years. There was significant worsening over the last year or so.  It is interfering significantly with eating, drinking and writing.      Co morbidities include a history of alcohol dependence - he quit 8 years ago. He has HTN (Prazosin), HLD (Zocor), prior smoking history, DVT, Follicular lymphoma, GERD, PTSD, anxiety, Mild cognitive impairment.     History is obtained from the patient and chart review and his daughter, who is present and can give details.    Hx of abnormal bleeding or anti-platelet use: no      Past Medical History  Past Medical History:   Diagnosis Date     Alcohol dependence (H) - quit 8 years ago      Anemia      Anxiety      BPH (benign prostatic hyperplasia)      Cataract      Chronic pain       DVT (deep venous thrombosis) (H)      Dysphagia      Dysthymia      Erosive gastritis      Follicular lymphoma (H)      Foot sprain      Gastroesophageal reflux disease with esophagitis      HTN (hypertension)      Hyperlipidemia      MCI (mild cognitive impairment)      Osteoarthritis      Presbyopia      PTSD (post-traumatic stress disorder)      Sensorineural hearing loss, bilateral      Tinnitus, bilateral      Urinary frequency        Past Surgical History  Past Surgical History:   Procedure Laterality Date     ANKLE SURGERY/metal plate with 4 pins Right      BACK SURGERY      lower back, herniated disc     CYSTECTOMY      pilonidal     VASECTOMY         Prior to Admission Medications  Current Outpatient Medications   Medication Sig Dispense Refill     acetaminophen (TYLENOL) 500 MG tablet Take 500-1,000 mg by mouth every 8 hours as needed for mild pain       Carboxymethylcellulose Sodium 1 % GEL Apply 1 drop to eye nightly as needed       cyproheptadine (PERIACTIN) 4 MG tablet Take 12 mg by mouth At Bedtime       finasteride (PROSCAR) 5 MG tablet Take 1 tablet by mouth daily       folic acid (FOLVITE) 1 MG tablet Take 1 tablet by mouth daily       gabapentin (NEURONTIN) 300 MG capsule Takes 300 mg capsule and 600 mg tablet (see other order as well)   Dosing as follows:    AM:  300 mg + 600 mg = 900 mg total  Afternoon: 300 mg + 600 mg = 900 mg total  Evenin mg x2 + 600 mg = 1200 mg total  Bedtime: 300 mg x3 + 600 mg = 1200 mg total       gabapentin (NEURONTIN) 600 MG tablet Takes 300 mg capsule and 600 mg tablet (see other order as well)   Dosing as follows:    AM:  300 mg + 600 mg = 900 mg total  Afternoon: 300 mg + 600 mg = 900 mg total  Evenin mg x2 + 600 mg = 1200 mg total  Bedtime: 300 mg x3 + 600 mg = 1200 mg total       mirtazapine (REMERON) 30 MG tablet Take 15 mg by mouth daily       NONFORMULARY Nocturnal CPAP with 2L oxygen       pantoprazole (PROTONIX) 40 MG EC tablet Take 40 mg by  mouth 2 times daily       prazosin (MINIPRESS) 5 MG capsule Take 10 mg by mouth At Bedtime       propylene glycol (SYSTANE BALANCE) 0.6 % SOLN ophthalmic solution Apply 1 drop to eye 4 times daily as needed       senna (SENOKOT) 8.6 MG tablet Take 1 tablet by mouth daily       sertraline (ZOLOFT) 100 MG tablet Take 150 mg by mouth daily       simvastatin (ZOCOR) 40 MG tablet Take 0.5 tablets by mouth At Bedtime       tamsulosin (FLOMAX) 0.4 MG capsule Take 1 capsule by mouth daily       triamcinolone (KENALOG) 0.1 % external cream Apply 1 Film topically daily as needed (Patient not taking: Reported on 9/9/2022)         Allergies  No Known Allergies    Social History  Social History     Socioeconomic History     Marital status:    Tobacco Use     Smoking status: Former Smoker     Smokeless tobacco: Never Used   Substance and Sexual Activity     Alcohol use: Alcohol dependence - quit 8 years ago     Drug use: No     Family History  Family History   Problem Relation Age of Onset     Tremor Father        Review of Systems  The complete review of systems is negative other than noted in the HPI or here.   Anesthesia Evaluation   Pt has had prior anesthetic. Type: General.        ROS/MED HX  ENT/Pulmonary: Comment: CPAP with oxygen    (+) sleep apnea, uses CPAP, tobacco use, Past use, 10  Pack-Year Hx,      Neurologic: Comment: Diagnosed at the [facility] last fall 2021    (+) TIA, date: 2021, features: dizziness, tremor.     Cardiovascular:     (+) Dyslipidemia hypertension-----fainting (syncope). Previous cardiac testing   Echo: Date: Results:    Stress Test: Date: 2021 Results:  Lexiscan normal perfusion study, no inducible ischemia.  Repeat stress test: chemical stress test - [facility] - pt reports as norml  ECG Reviewed: Date: Results:    Cath: Date: Results:      METS/Exercise Tolerance:     Hematologic:  - neg hematologic  ROS     Musculoskeletal: Comment: tremors      GI/Hepatic:  - neg GI/hepatic ROS    "  Renal/Genitourinary:  - neg Renal ROS     Endo:  - neg endo ROS     Psychiatric/Substance Use: Comment: Quit drinking 8 years ago    (+) psychiatric history anxiety     Infectious Disease:  - neg infectious disease ROS     Malignancy: Comment: Lymphoma treated at the [facility]  ~ 6 years ago  Last follow up 3 years ago - in remission/ released from clinic  (+) Malignancy, History of Lymphoma/Leukemia.Lymph CA Remission status post Chemo.        Other:            /87 (BP Location: Right arm, Patient Position: Sitting, Cuff Size: Adult Regular)   Pulse 68   Temp 97.7  F (36.5  C) (Oral)   Resp 16   Ht 1.676 m (5' 6\")   Wt 90.6 kg (199 lb 11.2 oz)   SpO2 95%   BMI 32.23 kg/m      Physical Exam   Constitutional: Awake, alert, cooperative, no apparent distress, and appears stated age.  Eyes: Pupils equal, round and reactive to light,  sclera clear, conjunctiva normal.  HENT: Normocephalic, oral pharynx with moist mucus membranes, chipped posterior dentition. No goiter appreciated.   Respiratory: Clear to auscultation bilaterally, no crackles or wheezing.  Cardiovascular: Regular rate and rhythm, normal S1 and S2, and no murmur noted.  Carotids +2, no bruits. No LE edema. Palpable pulses to radial  DP and PT arteries.   GI: Normal bowel sounds, soft, non-distended, non-tender, no masses palpated, no hepatosplenomegaly.    Lymph/Hematologic: No cervical lymphadenopathy and no supraclavicular lymphadenopathy.  Genitourinary:  deferred  Skin: Warm and dry.  No rashes at anticipated surgical site.   Musculoskeletal: Full ROM of neck. There is no redness, warmth, or swelling of the joints. Gross motor strength is normal.    Neurologic: Awake, alert, oriented to name, place and time. Cranial nerves II-XII are grossly intact. Gait is normal.   Neuropsychiatric: Calm, cooperative. Normal affect.     Prior Labs/Diagnostic Studies   All labs and imaging personally reviewed     2021 [facility] stress test: Lexiscan revealed " normal perfusion and no inducable ischemia.    8/25/22: Repeat Stress [facility]: for dyspnea:   Stress ECG with no significant ST changes or arrhythmias concerning for   inducible ischemia.     Overall left ventricular systolic function is normal calculated at 69%   without regional wall motion abnormalities.     Myocardial perfusion imaging is normal without evidence of infarct or   ischemia.         MRI: Moderate cortical atrophy, minimal subcortical white matter ischemic disease. Moderate size lateral ventricles    The patient's records and results personally reviewed by this provider.     Outside records reviewed from: Care Everywhere    LAB/DIAGNOSTIC STUDIES TODAY:    Ordered CBC, BMP, Type and screen    Assessment      Arley Butt is a 72 year old male seen as a PAC referral for risk assessment and optimization for anesthesia.    Plan/Recommendations  Pt will be optimized for the proposed procedure.  See below for details on the assessment, risk, and preoperative recommendations    NEUROLOGY  - History of TIA - patient and daughter report a diagnosis of TIA after evaluation for episode of dizziness and tremor -seen at [facility] - years ago - no recurrence. No focal neuro deficit reported.    -Post Op delirium risk factors:  History of pre-existing cognitive dysfunction - Mild cognitive impairment    ENT  - No current airway concerns.  Will need to be reassessed day of surgery.  Mallampati: I  TM: > 3    CARDIAC risk factors HTN, HLD, smoking history.  - Hypertension - /87  - HLD on Zocor    - METS (Metabolic Equivalents)  Patient performs 4 or more METS exercise without symptoms  - he does woodworking and lifts drawers, walks x 1 hour at the [place] 3 xweek and climbs 15 stairs without CP or COOK.         RCRI- Total Score: = 1 ( if patient did have a TIA)  reflecting 0.9% risk of major adverse cardiac event complication rate    Stress test in August 2022  for dyspnea was negative           PULMONARY  -  "Obstructive Sleep Apnea  NASIMA with home CPAP.  Patient will be instructed to bring their home CPAP device to the hospital with them. Nightly oxygen with CPAP  No other pulmonary diagnoses     - Tobacco History      History   Smoking Status     Former Smoker 1/2 PPD since age 17- quit in 1980   Smokeless Tobacco     Never Used       GI  - GERD  Controlled on medications: Proton Pump Inhibitor  PONV Low Risk  Total Score: 1           1 AN PONV: Patient is not a current smoker        /RENAL  - Baseline Creatinine  No recent lab. BMP today  -BPH on proscar    ENDOCRINE    - BMI: Estimated body mass index is 32.23 kg/m  as calculated from the following:    Height as of this encounter: 1.676 m (5' 6\").    Weight as of this encounter: 90.6 kg (199 lb 11.2 oz).  Obesity (BMI >30)  - No history of Diabetes Mellitus    HEME    DVT 6 years ago in the setting of lymphoma. No current anticoagulation.  Follicular lymphoma 6 years ago S/P chemotherapy. Last follow up 3 years ago - in remission and released from clinic.    VTE risk is elevated due to previous DVT:  Total score = 4 reflecting 1.8% risk           - No history of abnormal bleeding or antiplatelet use.      PSYCH  Anxiety. PTSD. Managed with zoloft and remeron. Pt stable.     Patient was discussed with Dr Wells    The patient is optimized for their procedure. AVS with information on surgery time/arrival time, meds and NPO status given by nursing staff. No further diagnostic testing indicated.      On the day of service:     Prep time: 20 minutes  Visit time: 25 minutes  Documentation time: 30 minutes  ------------------------------------------  Total time: 75 minutes      ZAKI Elder  Preoperative Assessment Center  [facility]  Clinic and Surgery Center  Phone: [phone]  Fax: [phone]  "

## 2022-09-12 NOTE — PATIENT INSTRUCTIONS
Preparing for Your Surgery      Name:  Arley Butt   MRN:  [id number]   :  1950   Today's Date:  2022       Arriving for surgery:  Surgery date:  22  Arrival time:  5:00 am       Visiting hours: 8 a.m. to 8:30 p.m.     Hospital: Adult patients and children (under age 18 can have 4 visitor at a time     No visitors under the age of 5 are allowed for hospital patients.     Surgeries and procedures: Adult patients can have 2 visitors all through the surgery process.     Patients with disabilities: Can have a support person with them (family member, service provider     Or someone well informed about their needs) plus the allowed number of visitors     Patients confirmed or suspected to have symptoms of COVID 19 or flu:     No visitors allowed for adult patients.   Children (under age 18) can have 1 named visitor.     People who are sick or showing symptoms of COVID 19 or flu:    Are not allowed to visit patients--we can only make exceptions in special situations.       Please follow these guidelines for your visit:   Arrive wearing a mask over your mouth and nose; we will give you a medical mask to wear    If you arrive wearing a cloth mask.   Keep it on during your entire visit, even when in patient's room.   If you don't wear a mask we'll ask you to leave.     Clean your hands with alcohol hand . Do this when you arrive at and leave the building and patient room,    And again after you touch your mask or anything in the room.     You can t visit if you have a fever, cough, shortness of breath, muscle aches, headaches, sore throat    Or diarrhea      Stay 6 feet away from others during your visit and between visits     Go directly to and from the room you are visiting.     Stay in the patient s room during your visit. Limit going to other places in the hospital as much as possible     Leave bags and jackets at home or in the car.     For everyone s health, please don t come and go during  your visit. That includes for smoking   during your visit. That includes for smoking    Please come to:     [facility] [place] [address]  [address] [place]  [address]    - ? parking is available in front of the hospital      -    Please proceed to [address] on the 3rd floor. [phone]?     - ?If you are in need of directions, wheelchair or escort please stop at the Information Desk in the lobby.  Inform the information person that you are here for surgery; a wheelchair and escort to [address] will be provided.?     What can I eat or drink?  -  You may eat and drink normally for up to 8 hours before your surgery. (Until 11:30 pm)  -  You may have clear liquids until 2 hours before surgery. (Until 5:00 am)    Examples of clear liquids:  Water  Clear broth  Juices (apple, white grape, white cranberry  and cider) without pulp  Noncarbonated, powder based beverages  (lemonade and Cole-Aid)  Sodas (Sprite, 7-Up, ginger ale and seltzer)  Coffee or tea (without milk or cream)  Gatorade    -  No Alcohol for at least 24 hours before surgery.     Which medicines can I take?    Hold Aspirin for 7 days before surgery.   Hold Multivitamins for 7 days before surgery.  Hold Supplements for 7 days before surgery.  Hold Ibuprofen (Advil, Motrin) for 1 day before surgery--unless otherwise directed by surgeon.  Hold Naproxen (Aleve) for 4 days before surgery.    Hold all NSAIDS for 7 days before spine surgery. (Ibuprofen, Naproxen, Celebrex, Indocin, Diclofenac.)    -  DO NOT take these medications the day of surgery:  Vitamin D - see above  Vitamin B-12 - see above  Folic acid  Polyethylene glycol    -  PLEASE TAKE these medications the day of surgery:  Tylenol if needed  Cyproheptadine may be taken the night before surgery  Finasteride  Gabapentin  Eye drops may be taken at the usual time  Mirtazapine may be taken the night before surgery  Pantoprazole  Prazosin may  be taken the night before surgery  Sertraline  Simvastatin may be taken the night before surgery  Tamsulosin      How do I prepare myself?  - Please take 2 showers before surgery using Scrubcare or Hibiclens soap.    Use this soap only from the neck to your toes.     Leave the soap on your skin for one minute--then rinse thoroughly.      You may use your own shampoo and conditioner. No other hair products.   - Please remove all jewelry and body piercings.  - No lotions, deodorants or fragrance.  - No makeup or fingernail polish.   - Bring your ID and insurance card.    -If you have a Deep Brain Stimulator, Spinal Cord Stimulator, or any Neuro Stimulator device---you must bring the remote control to the hospital.      ALL PATIENTS GOING HOME THE SAME DAY OF SURGERY ARE REQUIRED TO HAVE A RESPONSIBLE ADULT TO DRIVE AND BE IN ATTENDANCE WITH THEM FOR 24 HOURS FOLLOWING SURGERY.      Questions or Concerns:    - For any questions regarding the day of surgery or your hospital stay, please contact the Pre Admission Nursing Office at [phone].       - If you have health changes between today and your surgery, please call your surgeon.       - For questions after surgery, please call your surgeons office.      -   Parking is available in the Patient Visitor Ramp on [facility].     -   When entering the hospital you will be asked COVID screening questions, you will then be directed to Registration.  Registration will direct you to the 3rd floor Surgery waiting room.     -   Please ask if you need an escort or a wheelchair to the Surgery Waiting Room.  Preop number- [phone]

## 2022-09-12 NOTE — TELEPHONE ENCOUNTER
Svitlana, patient's daughter, stated Lana Michael called her but she was not able to talk. Svitlana stated for Lana to give her a call back.

## 2022-09-15 ENCOUNTER — TRANSCRIBE ORDERS (OUTPATIENT)
Dept: OTHER | Age: 72
End: 2022-09-15

## 2022-09-15 DIAGNOSIS — G25.2 OTHER SPECIFIED FORMS OF TREMOR: Primary | ICD-10-CM

## 2022-09-21 LAB
BLOOD BANK CHART COMMENT: NORMAL
SPECIMEN EXPIRATION DATE: NORMAL

## 2022-09-23 ENCOUNTER — LAB (OUTPATIENT)
Dept: LAB | Facility: CLINIC | Age: 72
End: 2022-09-23
Payer: COMMERCIAL

## 2022-09-23 DIAGNOSIS — Z20.822 ENCOUNTER FOR LABORATORY TESTING FOR COVID-19 VIRUS: ICD-10-CM

## 2022-09-23 LAB — SARS-COV-2 RNA RESP QL NAA+PROBE: NEGATIVE

## 2022-09-23 PROCEDURE — U0003 INFECTIOUS AGENT DETECTION BY NUCLEIC ACID (DNA OR RNA); SEVERE ACUTE RESPIRATORY SYNDROME CORONAVIRUS 2 (SARS-COV-2) (CORONAVIRUS DISEASE [COVID-19]), AMPLIFIED PROBE TECHNIQUE, MAKING USE OF HIGH THROUGHPUT TECHNOLOGIES AS DESCRIBED BY CMS-2020-01-R: HCPCS

## 2022-09-23 PROCEDURE — U0005 INFEC AGEN DETEC AMPLI PROBE: HCPCS

## 2022-09-26 ENCOUNTER — APPOINTMENT (OUTPATIENT)
Dept: GENERAL RADIOLOGY | Facility: CLINIC | Age: 72
DRG: 027 | End: 2022-09-26
Attending: STUDENT IN AN ORGANIZED HEALTH CARE EDUCATION/TRAINING PROGRAM
Payer: COMMERCIAL

## 2022-09-26 ENCOUNTER — ANESTHESIA (OUTPATIENT)
Dept: SURGERY | Facility: CLINIC | Age: 72
DRG: 027 | End: 2022-09-26
Payer: COMMERCIAL

## 2022-09-26 ENCOUNTER — APPOINTMENT (OUTPATIENT)
Dept: CT IMAGING | Facility: CLINIC | Age: 72
DRG: 027 | End: 2022-09-26
Attending: NURSE PRACTITIONER
Payer: COMMERCIAL

## 2022-09-26 ENCOUNTER — HOSPITAL ENCOUNTER (INPATIENT)
Facility: CLINIC | Age: 72
LOS: 1 days | Discharge: HOME | DRG: 027 | End: 2022-09-27
Attending: NEUROLOGICAL SURGERY | Admitting: NEUROLOGICAL SURGERY
Payer: COMMERCIAL

## 2022-09-26 ENCOUNTER — HOSPITAL (OUTPATIENT)
Dept: NEUROLOGY | Facility: CLINIC | Age: 72
End: 2022-09-26

## 2022-09-26 ENCOUNTER — APPOINTMENT (OUTPATIENT)
Dept: CT IMAGING | Facility: CLINIC | Age: 72
DRG: 027 | End: 2022-09-26
Attending: STUDENT IN AN ORGANIZED HEALTH CARE EDUCATION/TRAINING PROGRAM
Payer: COMMERCIAL

## 2022-09-26 ENCOUNTER — APPOINTMENT (OUTPATIENT)
Dept: GENERAL RADIOLOGY | Facility: CLINIC | Age: 72
DRG: 027 | End: 2022-09-26
Attending: NEUROLOGICAL SURGERY
Payer: COMMERCIAL

## 2022-09-26 DIAGNOSIS — G25.0 ESSENTIAL TREMOR: Primary | ICD-10-CM

## 2022-09-26 LAB
ANION GAP SERPL CALCULATED.3IONS-SCNC: 8 MMOL/L (ref 7–15)
APTT PPP: 31 SECONDS (ref 22–38)
BUN SERPL-MCNC: 24.8 MG/DL (ref 8–23)
CALCIUM SERPL-MCNC: 9.2 MG/DL (ref 8.8–10.2)
CHLORIDE SERPL-SCNC: 109 MMOL/L (ref 98–107)
CREAT SERPL-MCNC: 1.19 MG/DL (ref 0.67–1.17)
DEPRECATED HCO3 PLAS-SCNC: 25 MMOL/L (ref 22–29)
ERYTHROCYTE [DISTWIDTH] IN BLOOD BY AUTOMATED COUNT: 13.9 % (ref 10–15)
GFR SERPL CREATININE-BSD FRML MDRD: 65 ML/MIN/1.73M2
GLUCOSE BLDC GLUCOMTR-MCNC: 102 MG/DL (ref 70–99)
GLUCOSE SERPL-MCNC: 106 MG/DL (ref 70–99)
HCT VFR BLD AUTO: 35.4 % (ref 40–53)
HGB BLD-MCNC: 11.6 G/DL (ref 13.3–17.7)
INR PPP: 1.03 (ref 0.85–1.15)
MCH RBC QN AUTO: 30.5 PG (ref 26.5–33)
MCHC RBC AUTO-ENTMCNC: 32.8 G/DL (ref 31.5–36.5)
MCV RBC AUTO: 93 FL (ref 78–100)
PLATELET # BLD AUTO: 149 10E3/UL (ref 150–450)
POTASSIUM SERPL-SCNC: 4.2 MMOL/L (ref 3.4–5.3)
RBC # BLD AUTO: 3.8 10E6/UL (ref 4.4–5.9)
SODIUM SERPL-SCNC: 142 MMOL/L (ref 136–145)
WBC # BLD AUTO: 6.6 10E3/UL (ref 4–11)

## 2022-09-26 PROCEDURE — 258N000003 HC RX IP 258 OP 636

## 2022-09-26 PROCEDURE — C1713 ANCHOR/SCREW BN/BN,TIS/BN: HCPCS | Performed by: NEUROLOGICAL SURGERY

## 2022-09-26 PROCEDURE — 250N000011 HC RX IP 250 OP 636: Performed by: STUDENT IN AN ORGANIZED HEALTH CARE EDUCATION/TRAINING PROGRAM

## 2022-09-26 PROCEDURE — 250N000011 HC RX IP 250 OP 636: Performed by: NEUROLOGICAL SURGERY

## 2022-09-26 PROCEDURE — 81001 URINALYSIS AUTO W/SCOPE: CPT

## 2022-09-26 PROCEDURE — C1778 LEAD, NEUROSTIMULATOR: HCPCS | Performed by: NEUROLOGICAL SURGERY

## 2022-09-26 PROCEDURE — 82310 ASSAY OF CALCIUM: CPT | Performed by: STUDENT IN AN ORGANIZED HEALTH CARE EDUCATION/TRAINING PROGRAM

## 2022-09-26 PROCEDURE — 70250 X-RAY EXAM OF SKULL: CPT | Mod: 26 | Performed by: RADIOLOGY

## 2022-09-26 PROCEDURE — 70450 CT HEAD/BRAIN W/O DYE: CPT | Mod: 26 | Performed by: RADIOLOGY

## 2022-09-26 PROCEDURE — 250N000011 HC RX IP 250 OP 636

## 2022-09-26 PROCEDURE — 272N000001 HC OR GENERAL SUPPLY STERILE: Performed by: NEUROLOGICAL SURGERY

## 2022-09-26 PROCEDURE — 2W30XYZ IMMOBILIZATION OF HEAD USING OTHER DEVICE: ICD-10-PCS | Performed by: NEUROLOGICAL SURGERY

## 2022-09-26 PROCEDURE — 999N000179 XR SURGERY CARM FLUORO LESS THAN 5 MIN W STILLS: Mod: TC

## 2022-09-26 PROCEDURE — 710N000010 HC RECOVERY PHASE 1, LEVEL 2, PER MIN: Performed by: NEUROLOGICAL SURGERY

## 2022-09-26 PROCEDURE — 70450 CT HEAD/BRAIN W/O DYE: CPT | Mod: 77

## 2022-09-26 PROCEDURE — 250N000009 HC RX 250

## 2022-09-26 PROCEDURE — 85610 PROTHROMBIN TIME: CPT | Performed by: STUDENT IN AN ORGANIZED HEALTH CARE EDUCATION/TRAINING PROGRAM

## 2022-09-26 PROCEDURE — 70450 CT HEAD/BRAIN W/O DYE: CPT

## 2022-09-26 PROCEDURE — 258N000003 HC RX IP 258 OP 636: Performed by: STUDENT IN AN ORGANIZED HEALTH CARE EDUCATION/TRAINING PROGRAM

## 2022-09-26 PROCEDURE — 00H03MZ INSERTION OF NEUROSTIMULATOR LEAD INTO BRAIN, PERCUTANEOUS APPROACH: ICD-10-PCS | Performed by: NEUROLOGICAL SURGERY

## 2022-09-26 PROCEDURE — 360N000086 HC SURGERY LEVEL 6 W/ FLUORO, PER MIN: Performed by: NEUROLOGICAL SURGERY

## 2022-09-26 PROCEDURE — 250N000013 HC RX MED GY IP 250 OP 250 PS 637: Performed by: STUDENT IN AN ORGANIZED HEALTH CARE EDUCATION/TRAINING PROGRAM

## 2022-09-26 PROCEDURE — 370N000017 HC ANESTHESIA TECHNICAL FEE, PER MIN: Performed by: NEUROLOGICAL SURGERY

## 2022-09-26 PROCEDURE — 61867 IMPLANT NEUROELECTRODE: CPT | Mod: LT | Performed by: NEUROLOGICAL SURGERY

## 2022-09-26 PROCEDURE — 85027 COMPLETE CBC AUTOMATED: CPT | Performed by: STUDENT IN AN ORGANIZED HEALTH CARE EDUCATION/TRAINING PROGRAM

## 2022-09-26 PROCEDURE — 999N000141 HC STATISTIC PRE-PROCEDURE NURSING ASSESSMENT: Performed by: NEUROLOGICAL SURGERY

## 2022-09-26 PROCEDURE — 999N000065 XR SKULL PORT 1/3 VIEWS

## 2022-09-26 PROCEDURE — 120N000002 HC R&B MED SURG/OB UMMC

## 2022-09-26 PROCEDURE — 272N000004 HC RX 272: Performed by: NEUROLOGICAL SURGERY

## 2022-09-26 PROCEDURE — 8E09XBF COMPUTER ASSISTED PROCEDURE OF HEAD AND NECK REGION, WITH FLUOROSCOPY: ICD-10-PCS | Performed by: NEUROLOGICAL SURGERY

## 2022-09-26 PROCEDURE — 272N000002 HC OR SUPPLY OTHER OPNP: Performed by: NEUROLOGICAL SURGERY

## 2022-09-26 PROCEDURE — 85730 THROMBOPLASTIN TIME PARTIAL: CPT | Performed by: STUDENT IN AN ORGANIZED HEALTH CARE EDUCATION/TRAINING PROGRAM

## 2022-09-26 PROCEDURE — 250N000009 HC RX 250: Performed by: NEUROLOGICAL SURGERY

## 2022-09-26 PROCEDURE — 36415 COLL VENOUS BLD VENIPUNCTURE: CPT | Performed by: STUDENT IN AN ORGANIZED HEALTH CARE EDUCATION/TRAINING PROGRAM

## 2022-09-26 PROCEDURE — 250N000011 HC RX IP 250 OP 636: Performed by: ANESTHESIOLOGY

## 2022-09-26 DEVICE — KIT BURR HOLE COVER VERCISE DBS M365DB4600C0: Type: IMPLANTABLE DEVICE | Site: CRANIAL | Status: FUNCTIONAL

## 2022-09-26 DEVICE — DBS DIRECTIONAL LEAD STERILE KIT 45CM DB-2202-45: Type: IMPLANTABLE DEVICE | Site: CRANIAL | Status: FUNCTIONAL

## 2022-09-26 RX ORDER — ONDANSETRON 4 MG/1
4 TABLET, ORALLY DISINTEGRATING ORAL EVERY 30 MIN PRN
Status: DISCONTINUED | OUTPATIENT
Start: 2022-09-26 | End: 2022-09-26

## 2022-09-26 RX ORDER — CARBOXYMETHYLCELLULOSE SODIUM 10 MG/ML
1 GEL OPHTHALMIC
Status: DISCONTINUED | OUTPATIENT
Start: 2022-09-26 | End: 2022-09-26

## 2022-09-26 RX ORDER — ONDANSETRON 2 MG/ML
4 INJECTION INTRAMUSCULAR; INTRAVENOUS EVERY 6 HOURS PRN
Status: DISCONTINUED | OUTPATIENT
Start: 2022-09-26 | End: 2022-09-27 | Stop reason: HOSPADM

## 2022-09-26 RX ORDER — CYPROHEPTADINE HYDROCHLORIDE 4 MG/1
12 TABLET ORAL AT BEDTIME
Status: DISCONTINUED | OUTPATIENT
Start: 2022-09-26 | End: 2022-09-27 | Stop reason: HOSPADM

## 2022-09-26 RX ORDER — PROPOFOL 10 MG/ML
INJECTION, EMULSION INTRAVENOUS PRN
Status: DISCONTINUED | OUTPATIENT
Start: 2022-09-26 | End: 2022-09-26

## 2022-09-26 RX ORDER — LIDOCAINE 40 MG/G
CREAM TOPICAL
Status: DISCONTINUED | OUTPATIENT
Start: 2022-09-26 | End: 2022-09-27 | Stop reason: HOSPADM

## 2022-09-26 RX ORDER — ONDANSETRON 4 MG/1
4 TABLET, ORALLY DISINTEGRATING ORAL EVERY 6 HOURS PRN
Status: DISCONTINUED | OUTPATIENT
Start: 2022-09-26 | End: 2022-09-27 | Stop reason: HOSPADM

## 2022-09-26 RX ORDER — FINASTERIDE 5 MG/1
5 TABLET, FILM COATED ORAL EVERY EVENING
Status: DISCONTINUED | OUTPATIENT
Start: 2022-09-26 | End: 2022-09-27 | Stop reason: HOSPADM

## 2022-09-26 RX ORDER — HYDROMORPHONE HYDROCHLORIDE 1 MG/ML
0.2 INJECTION, SOLUTION INTRAMUSCULAR; INTRAVENOUS; SUBCUTANEOUS EVERY 5 MIN PRN
Status: DISCONTINUED | OUTPATIENT
Start: 2022-09-26 | End: 2022-09-26

## 2022-09-26 RX ORDER — CARBOXYMETHYLCELLULOSE SODIUM 10 MG/ML
1 GEL OPHTHALMIC
Status: DISCONTINUED | OUTPATIENT
Start: 2022-09-26 | End: 2022-09-27 | Stop reason: HOSPADM

## 2022-09-26 RX ORDER — OXYCODONE HYDROCHLORIDE 5 MG/1
5 TABLET ORAL
Status: DISCONTINUED | OUTPATIENT
Start: 2022-09-26 | End: 2022-09-27 | Stop reason: HOSPADM

## 2022-09-26 RX ORDER — GABAPENTIN 300 MG/1
1200 CAPSULE ORAL 2 TIMES DAILY
Status: DISCONTINUED | OUTPATIENT
Start: 2022-09-26 | End: 2022-09-27

## 2022-09-26 RX ORDER — SODIUM CHLORIDE, SODIUM LACTATE, POTASSIUM CHLORIDE, CALCIUM CHLORIDE 600; 310; 30; 20 MG/100ML; MG/100ML; MG/100ML; MG/100ML
INJECTION, SOLUTION INTRAVENOUS CONTINUOUS
Status: DISCONTINUED | OUTPATIENT
Start: 2022-09-26 | End: 2022-09-26

## 2022-09-26 RX ORDER — FENTANYL CITRATE 50 UG/ML
INJECTION, SOLUTION INTRAMUSCULAR; INTRAVENOUS PRN
Status: DISCONTINUED | OUTPATIENT
Start: 2022-09-26 | End: 2022-09-26

## 2022-09-26 RX ORDER — PROPOFOL 10 MG/ML
INJECTION, EMULSION INTRAVENOUS CONTINUOUS PRN
Status: DISCONTINUED | OUTPATIENT
Start: 2022-09-26 | End: 2022-09-26

## 2022-09-26 RX ORDER — LIDOCAINE HYDROCHLORIDE 20 MG/ML
JELLY TOPICAL PRN
Status: DISCONTINUED | OUTPATIENT
Start: 2022-09-26 | End: 2022-09-26 | Stop reason: HOSPADM

## 2022-09-26 RX ORDER — PROCHLORPERAZINE 25 MG
12.5 SUPPOSITORY, RECTAL RECTAL EVERY 12 HOURS PRN
Status: DISCONTINUED | OUTPATIENT
Start: 2022-09-26 | End: 2022-09-27 | Stop reason: HOSPADM

## 2022-09-26 RX ORDER — NALOXONE HYDROCHLORIDE 0.4 MG/ML
0.4 INJECTION, SOLUTION INTRAMUSCULAR; INTRAVENOUS; SUBCUTANEOUS
Status: DISCONTINUED | OUTPATIENT
Start: 2022-09-26 | End: 2022-09-27 | Stop reason: HOSPADM

## 2022-09-26 RX ORDER — CEFAZOLIN SODIUM/WATER 2 G/20 ML
2 SYRINGE (ML) INTRAVENOUS EVERY 8 HOURS
Status: COMPLETED | OUTPATIENT
Start: 2022-09-26 | End: 2022-09-27

## 2022-09-26 RX ORDER — OXYCODONE HYDROCHLORIDE 5 MG/1
5 TABLET ORAL EVERY 6 HOURS PRN
Qty: 12 TABLET | Refills: 0 | Status: SHIPPED | OUTPATIENT
Start: 2022-09-26 | End: 2022-09-30

## 2022-09-26 RX ORDER — ONDANSETRON 2 MG/ML
4 INJECTION INTRAMUSCULAR; INTRAVENOUS EVERY 30 MIN PRN
Status: DISCONTINUED | OUTPATIENT
Start: 2022-09-26 | End: 2022-09-26

## 2022-09-26 RX ORDER — CEFAZOLIN SODIUM/WATER 2 G/20 ML
2 SYRINGE (ML) INTRAVENOUS SEE ADMIN INSTRUCTIONS
Status: DISCONTINUED | OUTPATIENT
Start: 2022-09-26 | End: 2022-09-26 | Stop reason: HOSPADM

## 2022-09-26 RX ORDER — ACETAMINOPHEN 325 MG/1
650 TABLET ORAL EVERY 4 HOURS PRN
Status: DISCONTINUED | OUTPATIENT
Start: 2022-09-26 | End: 2022-09-27 | Stop reason: HOSPADM

## 2022-09-26 RX ORDER — TAMSULOSIN HYDROCHLORIDE 0.4 MG/1
0.4 CAPSULE ORAL EVERY EVENING
Status: DISCONTINUED | OUTPATIENT
Start: 2022-09-26 | End: 2022-09-27 | Stop reason: HOSPADM

## 2022-09-26 RX ORDER — HYDROMORPHONE HCL IN WATER/PF 6 MG/30 ML
.2-.4 PATIENT CONTROLLED ANALGESIA SYRINGE INTRAVENOUS EVERY 4 HOURS PRN
Status: DISCONTINUED | OUTPATIENT
Start: 2022-09-26 | End: 2022-09-27 | Stop reason: HOSPADM

## 2022-09-26 RX ORDER — SODIUM CHLORIDE, SODIUM LACTATE, POTASSIUM CHLORIDE, CALCIUM CHLORIDE 600; 310; 30; 20 MG/100ML; MG/100ML; MG/100ML; MG/100ML
INJECTION, SOLUTION INTRAVENOUS CONTINUOUS
Status: DISCONTINUED | OUTPATIENT
Start: 2022-09-26 | End: 2022-09-26 | Stop reason: HOSPADM

## 2022-09-26 RX ORDER — SODIUM CHLORIDE 9 MG/ML
INJECTION, SOLUTION INTRAVENOUS CONTINUOUS
Status: ACTIVE | OUTPATIENT
Start: 2022-09-26 | End: 2022-09-26

## 2022-09-26 RX ORDER — OXYCODONE HYDROCHLORIDE 10 MG/1
10 TABLET ORAL EVERY 4 HOURS PRN
Status: DISCONTINUED | OUTPATIENT
Start: 2022-09-26 | End: 2022-09-26 | Stop reason: HOSPADM

## 2022-09-26 RX ORDER — ONDANSETRON 2 MG/ML
INJECTION INTRAMUSCULAR; INTRAVENOUS PRN
Status: DISCONTINUED | OUTPATIENT
Start: 2022-09-26 | End: 2022-09-26

## 2022-09-26 RX ORDER — GABAPENTIN 300 MG/1
900 CAPSULE ORAL 2 TIMES DAILY
Status: DISCONTINUED | OUTPATIENT
Start: 2022-09-27 | End: 2022-09-27

## 2022-09-26 RX ORDER — NALOXONE HYDROCHLORIDE 0.4 MG/ML
0.2 INJECTION, SOLUTION INTRAMUSCULAR; INTRAVENOUS; SUBCUTANEOUS
Status: DISCONTINUED | OUTPATIENT
Start: 2022-09-26 | End: 2022-09-27 | Stop reason: HOSPADM

## 2022-09-26 RX ORDER — LABETALOL HYDROCHLORIDE 5 MG/ML
10-40 INJECTION, SOLUTION INTRAVENOUS EVERY 10 MIN PRN
Status: DISCONTINUED | OUTPATIENT
Start: 2022-09-26 | End: 2022-09-27 | Stop reason: HOSPADM

## 2022-09-26 RX ORDER — OXYCODONE HYDROCHLORIDE 5 MG/1
5 TABLET ORAL EVERY 4 HOURS PRN
Status: DISCONTINUED | OUTPATIENT
Start: 2022-09-26 | End: 2022-09-26

## 2022-09-26 RX ORDER — POLYETHYLENE GLYCOL 3350 17 G/17G
17 POWDER, FOR SOLUTION ORAL DAILY
Status: DISCONTINUED | OUTPATIENT
Start: 2022-09-27 | End: 2022-09-27 | Stop reason: HOSPADM

## 2022-09-26 RX ORDER — ONDANSETRON 4 MG/1
4 TABLET, ORALLY DISINTEGRATING ORAL EVERY 30 MIN PRN
Status: DISCONTINUED | OUTPATIENT
Start: 2022-09-26 | End: 2022-09-26 | Stop reason: HOSPADM

## 2022-09-26 RX ORDER — ONDANSETRON 2 MG/ML
4 INJECTION INTRAMUSCULAR; INTRAVENOUS ONCE
Status: COMPLETED | OUTPATIENT
Start: 2022-09-26 | End: 2022-09-26

## 2022-09-26 RX ORDER — HYDROMORPHONE HCL IN WATER/PF 6 MG/30 ML
0.2 PATIENT CONTROLLED ANALGESIA SYRINGE INTRAVENOUS EVERY 5 MIN PRN
Status: DISCONTINUED | OUTPATIENT
Start: 2022-09-26 | End: 2022-09-26 | Stop reason: HOSPADM

## 2022-09-26 RX ORDER — LABETALOL HYDROCHLORIDE 5 MG/ML
10 INJECTION, SOLUTION INTRAVENOUS
Status: DISCONTINUED | OUTPATIENT
Start: 2022-09-26 | End: 2022-09-26 | Stop reason: HOSPADM

## 2022-09-26 RX ORDER — LIDOCAINE HYDROCHLORIDE 20 MG/ML
INJECTION, SOLUTION INFILTRATION; PERINEURAL PRN
Status: DISCONTINUED | OUTPATIENT
Start: 2022-09-26 | End: 2022-09-26

## 2022-09-26 RX ORDER — ONDANSETRON 2 MG/ML
4 INJECTION INTRAMUSCULAR; INTRAVENOUS EVERY 30 MIN PRN
Status: DISCONTINUED | OUTPATIENT
Start: 2022-09-26 | End: 2022-09-26 | Stop reason: HOSPADM

## 2022-09-26 RX ORDER — PANTOPRAZOLE SODIUM 40 MG/1
40 TABLET, DELAYED RELEASE ORAL 2 TIMES DAILY
Status: DISCONTINUED | OUTPATIENT
Start: 2022-09-26 | End: 2022-09-27 | Stop reason: HOSPADM

## 2022-09-26 RX ORDER — PRAZOSIN HYDROCHLORIDE 5 MG/1
10 CAPSULE ORAL AT BEDTIME
Status: DISCONTINUED | OUTPATIENT
Start: 2022-09-26 | End: 2022-09-27 | Stop reason: HOSPADM

## 2022-09-26 RX ORDER — FENTANYL CITRATE 50 UG/ML
50 INJECTION, SOLUTION INTRAMUSCULAR; INTRAVENOUS EVERY 5 MIN PRN
Status: DISCONTINUED | OUTPATIENT
Start: 2022-09-26 | End: 2022-09-26

## 2022-09-26 RX ORDER — SENNOSIDES 8.6 MG
2 TABLET ORAL 2 TIMES DAILY
Status: DISCONTINUED | OUTPATIENT
Start: 2022-09-26 | End: 2022-09-27 | Stop reason: HOSPADM

## 2022-09-26 RX ORDER — CEFAZOLIN SODIUM/WATER 2 G/20 ML
2 SYRINGE (ML) INTRAVENOUS
Status: COMPLETED | OUTPATIENT
Start: 2022-09-26 | End: 2022-09-26

## 2022-09-26 RX ORDER — POLYETHYLENE GLYCOL 3350 17 G/17G
1 POWDER, FOR SOLUTION ORAL DAILY
Qty: 30 PACKET | Refills: 0 | Status: SHIPPED | OUTPATIENT
Start: 2022-09-26

## 2022-09-26 RX ORDER — MIRTAZAPINE 15 MG/1
15 TABLET, FILM COATED ORAL AT BEDTIME
Status: DISCONTINUED | OUTPATIENT
Start: 2022-09-26 | End: 2022-09-27 | Stop reason: HOSPADM

## 2022-09-26 RX ORDER — DEXMEDETOMIDINE HYDROCHLORIDE 4 UG/ML
.1-1.2 INJECTION, SOLUTION INTRAVENOUS CONTINUOUS
Status: DISCONTINUED | OUTPATIENT
Start: 2022-09-26 | End: 2022-09-26

## 2022-09-26 RX ORDER — SIMVASTATIN 20 MG
20 TABLET ORAL AT BEDTIME
Status: DISCONTINUED | OUTPATIENT
Start: 2022-09-26 | End: 2022-09-27 | Stop reason: HOSPADM

## 2022-09-26 RX ORDER — SENNA AND DOCUSATE SODIUM 50; 8.6 MG/1; MG/1
2 TABLET, FILM COATED ORAL 2 TIMES DAILY
Qty: 60 TABLET | Refills: 0 | Status: SHIPPED | OUTPATIENT
Start: 2022-09-26 | End: 2023-04-03

## 2022-09-26 RX ORDER — PROCHLORPERAZINE MALEATE 5 MG
5 TABLET ORAL EVERY 6 HOURS PRN
Status: DISCONTINUED | OUTPATIENT
Start: 2022-09-26 | End: 2022-09-27 | Stop reason: HOSPADM

## 2022-09-26 RX ORDER — HYDRALAZINE HYDROCHLORIDE 20 MG/ML
10-20 INJECTION INTRAMUSCULAR; INTRAVENOUS EVERY 30 MIN PRN
Status: DISCONTINUED | OUTPATIENT
Start: 2022-09-26 | End: 2022-09-27 | Stop reason: HOSPADM

## 2022-09-26 RX ORDER — SODIUM CHLORIDE, SODIUM LACTATE, POTASSIUM CHLORIDE, CALCIUM CHLORIDE 600; 310; 30; 20 MG/100ML; MG/100ML; MG/100ML; MG/100ML
INJECTION, SOLUTION INTRAVENOUS CONTINUOUS PRN
Status: DISCONTINUED | OUTPATIENT
Start: 2022-09-26 | End: 2022-09-26

## 2022-09-26 RX ORDER — LIDOCAINE HYDROCHLORIDE AND EPINEPHRINE 10; 10 MG/ML; UG/ML
INJECTION, SOLUTION INFILTRATION; PERINEURAL PRN
Status: DISCONTINUED | OUTPATIENT
Start: 2022-09-26 | End: 2022-09-26 | Stop reason: HOSPADM

## 2022-09-26 RX ADMIN — SERTRALINE HYDROCHLORIDE 150 MG: 100 TABLET ORAL at 20:38

## 2022-09-26 RX ADMIN — PROPOFOL 30 MG: 10 INJECTION, EMULSION INTRAVENOUS at 08:10

## 2022-09-26 RX ADMIN — FENTANYL CITRATE 50 MCG: 50 INJECTION, SOLUTION INTRAMUSCULAR; INTRAVENOUS at 15:24

## 2022-09-26 RX ADMIN — FENTANYL CITRATE 25 MCG: 50 INJECTION, SOLUTION INTRAMUSCULAR; INTRAVENOUS at 14:36

## 2022-09-26 RX ADMIN — PANTOPRAZOLE SODIUM 40 MG: 40 TABLET, DELAYED RELEASE ORAL at 20:39

## 2022-09-26 RX ADMIN — HYDROMORPHONE HYDROCHLORIDE 0.2 MG: 0.2 INJECTION, SOLUTION INTRAMUSCULAR; INTRAVENOUS; SUBCUTANEOUS at 16:02

## 2022-09-26 RX ADMIN — PROCHLORPERAZINE EDISYLATE 5 MG: 5 INJECTION INTRAMUSCULAR; INTRAVENOUS at 16:05

## 2022-09-26 RX ADMIN — DEXMEDETOMIDINE HYDROCHLORIDE 8 MCG: 100 INJECTION, SOLUTION INTRAVENOUS at 08:07

## 2022-09-26 RX ADMIN — PRAZOSIN HYDROCHLORIDE 10 MG: 5 CAPSULE ORAL at 22:33

## 2022-09-26 RX ADMIN — OXYCODONE HYDROCHLORIDE 5 MG: 5 TABLET ORAL at 17:58

## 2022-09-26 RX ADMIN — TAMSULOSIN HYDROCHLORIDE 0.4 MG: 0.4 CAPSULE ORAL at 20:38

## 2022-09-26 RX ADMIN — Medication 2 G: at 23:57

## 2022-09-26 RX ADMIN — FENTANYL CITRATE 50 MCG: 50 INJECTION, SOLUTION INTRAMUSCULAR; INTRAVENOUS at 15:07

## 2022-09-26 RX ADMIN — LIDOCAINE HYDROCHLORIDE 50 MG: 20 INJECTION, SOLUTION INFILTRATION; PERINEURAL at 08:07

## 2022-09-26 RX ADMIN — FENTANYL CITRATE 25 MCG: 50 INJECTION, SOLUTION INTRAMUSCULAR; INTRAVENOUS at 14:31

## 2022-09-26 RX ADMIN — PROPOFOL 30 MG: 10 INJECTION, EMULSION INTRAVENOUS at 08:08

## 2022-09-26 RX ADMIN — PROPOFOL 30 MCG/KG/MIN: 10 INJECTION, EMULSION INTRAVENOUS at 08:08

## 2022-09-26 RX ADMIN — CYPROHEPTADINE HYDROCHLORIDE 12 MG: 4 TABLET ORAL at 22:32

## 2022-09-26 RX ADMIN — ONDANSETRON 4 MG: 2 INJECTION INTRAMUSCULAR; INTRAVENOUS at 19:09

## 2022-09-26 RX ADMIN — Medication 2 G: at 08:36

## 2022-09-26 RX ADMIN — ONDANSETRON 4 MG: 2 INJECTION INTRAMUSCULAR; INTRAVENOUS at 15:39

## 2022-09-26 RX ADMIN — PROPOFOL 30 MG: 10 INJECTION, EMULSION INTRAVENOUS at 13:51

## 2022-09-26 RX ADMIN — Medication 2 G: at 12:38

## 2022-09-26 RX ADMIN — Medication 2 G: at 18:54

## 2022-09-26 RX ADMIN — GABAPENTIN 1200 MG: 300 CAPSULE ORAL at 22:32

## 2022-09-26 RX ADMIN — HYDROMORPHONE HYDROCHLORIDE 0.2 MG: 0.2 INJECTION, SOLUTION INTRAMUSCULAR; INTRAVENOUS; SUBCUTANEOUS at 16:27

## 2022-09-26 RX ADMIN — SIMVASTATIN 20 MG: 20 TABLET, FILM COATED ORAL at 22:32

## 2022-09-26 RX ADMIN — PROPOFOL 30 MG: 10 INJECTION, EMULSION INTRAVENOUS at 09:03

## 2022-09-26 RX ADMIN — FINASTERIDE 5 MG: 5 TABLET, FILM COATED ORAL at 20:39

## 2022-09-26 RX ADMIN — ACETAMINOPHEN 650 MG: 325 TABLET, FILM COATED ORAL at 20:39

## 2022-09-26 RX ADMIN — ACETAMINOPHEN 650 MG: 325 TABLET, FILM COATED ORAL at 16:27

## 2022-09-26 RX ADMIN — GABAPENTIN 1200 MG: 300 CAPSULE ORAL at 17:55

## 2022-09-26 RX ADMIN — SODIUM CHLORIDE: 9 INJECTION, SOLUTION INTRAVENOUS at 15:47

## 2022-09-26 RX ADMIN — ONDANSETRON 4 MG: 2 INJECTION INTRAMUSCULAR; INTRAVENOUS at 14:27

## 2022-09-26 RX ADMIN — MIRTAZAPINE 15 MG: 15 TABLET, FILM COATED ORAL at 22:32

## 2022-09-26 RX ADMIN — SODIUM CHLORIDE, POTASSIUM CHLORIDE, SODIUM LACTATE AND CALCIUM CHLORIDE: 600; 310; 30; 20 INJECTION, SOLUTION INTRAVENOUS at 14:35

## 2022-09-26 RX ADMIN — DEXMEDETOMIDINE HYDROCHLORIDE 8 MCG: 100 INJECTION, SOLUTION INTRAVENOUS at 13:30

## 2022-09-26 RX ADMIN — HYDROMORPHONE HYDROCHLORIDE 0.2 MG: 0.2 INJECTION, SOLUTION INTRAMUSCULAR; INTRAVENOUS; SUBCUTANEOUS at 16:40

## 2022-09-26 RX ADMIN — DEXMEDETOMIDINE HYDROCHLORIDE 0.5 MCG/KG/HR: 400 INJECTION INTRAVENOUS at 08:08

## 2022-09-26 RX ADMIN — ONDANSETRON 4 MG: 2 INJECTION INTRAMUSCULAR; INTRAVENOUS at 19:20

## 2022-09-26 RX ADMIN — PROPOFOL 30 MG: 10 INJECTION, EMULSION INTRAVENOUS at 13:58

## 2022-09-26 RX ADMIN — PROPOFOL 20 MG: 10 INJECTION, EMULSION INTRAVENOUS at 14:03

## 2022-09-26 RX ADMIN — SODIUM CHLORIDE, POTASSIUM CHLORIDE, SODIUM LACTATE AND CALCIUM CHLORIDE: 600; 310; 30; 20 INJECTION, SOLUTION INTRAVENOUS at 07:51

## 2022-09-26 ASSESSMENT — ACTIVITIES OF DAILY LIVING (ADL)
ADLS_ACUITY_SCORE: 22

## 2022-09-26 ASSESSMENT — LIFESTYLE VARIABLES: TOBACCO_USE: 1

## 2022-09-26 NOTE — ANESTHESIA CARE TRANSFER NOTE
Patient: Arley Butt    Procedure: Procedure(s):  stealth assisted Left side deep brain stimulator placement, phase I, placement of left side deep brain stimulator electrode, target left ventral intermediate nucleus of the thalamus, with microelectrode recording       Diagnosis: Essential tremor [G25.0]  Diagnosis Additional Information: No value filed.    Anesthesia Type:   MAC     Note:    Oropharynx: oropharynx clear of all foreign objects and spontaneously breathing  Level of Consciousness: awake  Oxygen Supplementation: room air    Independent Airway: airway patency satisfactory and stable  Dentition: dentition unchanged  Vital Signs Stable: post-procedure vital signs reviewed and stable  Report to RN Given: handoff report given  Patient transferred to: PACU    Handoff Report: Identifed the Patient, Identified the Reponsible Provider, Reviewed the pertinent medical history, Discussed the surgical course, Reviewed Intra-OP anesthesia mangement and issues during anesthesia, Set expectations for post-procedure period and Allowed opportunity for questions and acknowledgement of understanding      Vitals:  Vitals Value Taken Time   /75 09/26/22 1456   Temp     Pulse 57 09/26/22 1501   Resp     SpO2 96 % 09/26/22 1501   Vitals shown include unvalidated device data.    Electronically Signed By: DYAN Orantes CRNA  September 26, 2022  3:03 PM

## 2022-09-26 NOTE — ANESTHESIA PREPROCEDURE EVALUATION
Anesthesia Pre-Procedure Evaluation    Patient: Arley Butt   MRN: [id number] : 1950        Procedure : Procedure(s):  stealth assisted Left side deep brain stimulator placement, phase I, placement of left side deep brain stimulator electrode, target left ventral intermediate nucleus of the thalamus, with microelectrode recording          Past Medical History:   Diagnosis Date     Alcohol dependence (H)      Anemia      Anxiety      BPH (benign prostatic hyperplasia)      Cataract      Chronic pain      DVT (deep venous thrombosis) (H)      Dysphagia      Dysthymia      Erosive gastritis      Esophagitis      Follicular lymphoma (H)      Foot sprain      Gastroesophageal reflux disease with esophagitis      HTN (hypertension)      Hyperlipidemia      Impacted cerumen      MCI (mild cognitive impairment)      Osteoarthritis      Presbyopia      PTSD (post-traumatic stress disorder)      Sensorineural hearing loss, bilateral      Tinnitus, bilateral      Urinary frequency       Past Surgical History:   Procedure Laterality Date     ANKLE SURGERY Right      BACK SURGERY      lower back, herniated disc     CYSTECTOMY      pilonidal     VASECTOMY        No Known Allergies   Social History     Tobacco Use     Smoking status: Former Smoker     Smokeless tobacco: Never Used   Substance Use Topics     Alcohol use: Not on file      Wt Readings from Last 1 Encounters:   22 88.5 kg (195 lb 1.7 oz)        Anesthesia Evaluation   Pt has had prior anesthetic. Type: General and MAC.        ROS/MED HX  ENT/Pulmonary: Comment: CPAP with oxygen    (+) sleep apnea, uses CPAP, tobacco use, Past use, 10  Pack-Year Hx,      Neurologic: Comment: Diagnosed at the [facility] last 2021    (+) TIA, date: , features: dizziness, tremor.     Cardiovascular:     (+) Dyslipidemia hypertension-----fainting (syncope). Previous cardiac testing   Echo: Date: Results:    Stress Test: Date:  Results:  Lexiscan normal perfusion  study, no inducible ischemia.  Repeat stress test: chemical stress test - [facility] - pt reports as norml  ECG Reviewed: Date: Results:    Cath: Date: Results:      METS/Exercise Tolerance:     Hematologic:  - neg hematologic  ROS     Musculoskeletal: Comment: tremors      GI/Hepatic:  - neg GI/hepatic ROS     Renal/Genitourinary:  - neg Renal ROS     Endo:  - neg endo ROS     Psychiatric/Substance Use: Comment: Quit drinking 8 years ago    (+) psychiatric history anxiety     Infectious Disease:  - neg infectious disease ROS     Malignancy: Comment: Lymphoma treated at the [facility]  ~ 6 years ago  Last follow up 3 years ago - in remission/ released from clinic  (+) Malignancy, History of Lymphoma/Leukemia.Lymph CA Remission status post Chemo.        Other:            Physical Exam    Airway        Mallampati: II   TM distance: > 3 FB   Neck ROM: full   Mouth opening: > 3 cm    Respiratory Devices and Support    CPAP:      Dental  no notable dental history         Cardiovascular   cardiovascular exam normal          Pulmonary   pulmonary exam normal                OUTSIDE LABS:  CBC:   Lab Results   Component Value Date    WBC 6.6 09/26/2022    WBC 5.5 09/12/2022    HGB 11.6 (L) 09/26/2022    HGB 12.7 (L) 09/12/2022    HCT 35.4 (L) 09/26/2022    HCT 37.8 (L) 09/12/2022     (L) 09/26/2022     09/12/2022     BMP:   Lab Results   Component Value Date     09/26/2022     09/12/2022    POTASSIUM 4.2 09/26/2022    POTASSIUM 4.4 09/12/2022    CHLORIDE 109 (H) 09/26/2022    CHLORIDE 107 09/12/2022    CO2 25 09/26/2022    CO2 23 09/12/2022    BUN 24.8 (H) 09/26/2022    BUN 21.1 09/12/2022    CR 1.19 (H) 09/26/2022    CR 1.11 09/12/2022     (H) 09/26/2022     (H) 09/26/2022     COAGS:   Lab Results   Component Value Date    PTT 31 09/26/2022    INR 1.03 09/26/2022     POC: No results found for: BGM, HCG, HCGS  HEPATIC: No results found for: ALBUMIN, PROTTOTAL, ALT, AST, GGT, ALKPHOS,  BILITOTAL, BILIDIRECT, GURVINDER  OTHER:   Lab Results   Component Value Date    SHRADDHA 9.2 09/26/2022       Anesthesia Plan    ASA Status:  3      Anesthesia Type: MAC.     - Reason for MAC: straight local not clinically adequate   Induction: Intravenous.           Consents    Anesthesia Plan(s) and associated risks, benefits, and realistic alternatives discussed. Questions answered and patient/representative(s) expressed understanding.    - Discussed:     - Discussed with:  Patient         Postoperative Care    Pain management: IV analgesics.        Comments:    Other Comments: Pt is NPO. Meds reviewed. No problems with anesthesia. No CP or SOB. Has some PTSD, specific to one of the stimulator sounds. Entire team aware.       H&P reviewed: Unable to attach H&P to encounter due to EHR limitations. H&P Update: appropriate H&P reviewed, patient examined. No interval changes since H&P (within 30 days).         Helen Soler MD

## 2022-09-26 NOTE — BRIEF OP NOTE
[facility]    Brief Operative Note    Pre-operative diagnosis: Essential tremor [G25.0]  Post-operative diagnosis Same as pre-operative diagnosis    Procedure: Procedure(s):  stealth assisted Left side deep brain stimulator placement, phase I, placement of left side deep brain stimulator electrode, target left ventral intermediate nucleus of the thalamus, with microelectrode recording  Surgeon: Surgeon(s) and Role:     * Manuel Otero MD - Primary     * Mohan Dunne MD - Resident - Assisting  Anesthesia: MAC with Local   Estimated Blood Loss: 5 mL from 9/26/2022  7:50 AM to 9/26/2022  2:54 PM      Drains: None  Specimens: * No specimens in log *  Findings:   All impedences normal. Bengun had to rotated 30 degrees clockwise and 1mm to the right. Electrode placed in anterior bengun..  Complications: None.  Implants:   Implant Name Type Inv. Item Serial No.  Lot No. LRB No. Used Action   KIT AMILCAR HOLE COVER VERCISE DBS [id number] - [id number] Metallic Hardware/[place] KIT AMILCAR HOLE COVER VERCISE DBS [id number]  [place] [id number] Left 1 Implanted   DBS DIRECTIONAL LEAD STERILE KIT 45CM DB-[id number] - [id number] Leads DBS DIRECTIONAL LEAD STERILE KIT 45CM [id number]-[id number] [place]  Left 1 Implanted       Skin closed with monocryl 4-0.

## 2022-09-26 NOTE — DISCHARGE SUMMARY
[facility] Discharge Summary and Instructions    Arley Butt MRN# [id number]   Age: 72 year old YOB: 1950     Date of Admission:  9/26/2022  Date of Discharge::  9/27/2022  Admitting Physician:  Manuel Otero MD  Discharge Physician:  Manuel Otero MD          Admission Diagnoses:   Essential tremor [G25.0]          Discharge Diagnosis:     Essential tremor [G25.0]          Procedures:   09/27/2022  stealth assisted Left side deep brain stimulator placement, phase I, placement of left side deep brain stimulator electrode, target left ventral intermediate nucleus of the thalamus, with microelectrode recording           Brief History of Illness:   70 y/o right handed gentleman with ET referred for neurosurgical consideration for DBS from the [facility]. See my prior note for details. In brief, he has had bilateral hand tremors, R>L for a few years, but it has significantly worsened over the last year or so. Has tried primidone and propranolol without benefit. It is interfering significantly with his daily living activities including eating, drinking, writing.           Hospital Course:   Patient underwent above-mentioned procedure on 09/27/2022. The operation was uncomplicated and he was admitted to the surgical floor for routine post operative cares. On post operative day 1 he was doing well, he was ambulating, voiding without a dominguez, eating a regular diet, pain was well controlled and therefore he was discharged to home. Of note, CT Head and X Ray skull that were performed post-operatively were not remarkable for acute pathology and showed good placement of hardware.    Examination at discharge:    Alert and oriented x 3  Incision dry, clean, intact  5/5 strength in both upper and lower extremities.          Discharge Medications:     Current Discharge Medication List      START taking these medications    Details   oxyCODONE (ROXICODONE) 5 MG tablet Take 1 tablet (5 mg) by  mouth every 6 hours as needed for pain  Qty: 12 tablet, Refills: 0    Associated Diagnoses: Essential tremor      polyethylene glycol (MIRALAX) 17 g packet Take 17 g by mouth daily  Qty: 30 packet, Refills: 0    Associated Diagnoses: Essential tremor      SENNA-docusate sodium (SENNA S) 8.6-50 MG tablet Take 2 tablets by mouth 2 times daily  Qty: 60 tablet, Refills: 0    Associated Diagnoses: Essential tremor         CONTINUE these medications which have NOT CHANGED    Details   cyproheptadine (PERIACTIN) 4 MG tablet Take 12 mg by mouth At Bedtime      finasteride (PROSCAR) 5 MG tablet Take 1 tablet by mouth daily      folic acid (FOLVITE) 1 MG tablet Take 1 tablet by mouth daily      gabapentin (NEURONTIN) 300 MG capsule Takes 300 mg capsule and 600 mg tablet (see other order as well)   Dosing as follows:    AM:  300 mg + 600 mg = 900 mg total  Afternoon: 300 mg + 600 mg = 900 mg total  Evenin mg x2 + 600 mg = 1200 mg total  Bedtime: 300 mg x3 + 600 mg = 1200 mg total      gabapentin (NEURONTIN) 600 MG tablet Takes 300 mg capsule and 600 mg tablet (see other order as well)   Dosing as follows:    AM:  300 mg + 600 mg = 900 mg total  Afternoon: 300 mg + 600 mg = 900 mg total  Evenin mg x2 + 600 mg = 1200 mg total  Bedtime: 300 mg x3 + 600 mg = 1200 mg total      mirtazapine (REMERON) 30 MG tablet Take 15 mg by mouth daily      NONFORMULARY Nocturnal CPAP with 2L oxygen      pantoprazole (PROTONIX) 40 MG EC tablet Take 40 mg by mouth 2 times daily      prazosin (MINIPRESS) 5 MG capsule Take 10 mg by mouth At Bedtime      sertraline (ZOLOFT) 100 MG tablet Take 150 mg by mouth daily      simvastatin (ZOCOR) 40 MG tablet Take 0.5 tablets by mouth At Bedtime      tamsulosin (FLOMAX) 0.4 MG capsule Take 1 capsule by mouth daily      triamcinolone (KENALOG) 0.1 % external cream Apply 1 Film topically daily as needed      acetaminophen (TYLENOL) 500 MG tablet Take 500-1,000 mg by mouth every 8 hours as needed  for mild pain      Carboxymethylcellulose Sodium 1 % GEL Apply 1 drop to eye nightly as needed      propylene glycol (SYSTANE BALANCE) 0.6 % SOLN ophthalmic solution Apply 1 drop to eye 4 times daily as needed      senna (SENOKOT) 8.6 MG tablet Take 1 tablet by mouth daily                     Discharge Instructions and Follow-Up:     Discharge diet: Regular   Discharge activity: You may advance activity as tolerated. No strenuous exercise or heay lifting greater than 10 lbs for 4 weeks or until seen and cleared in clinic.   Discharge follow-up: Follow-up with Dr. Manuel Otero MD for next surgery DBS Phase II as scheduled for next week.    Wound care: Ok to shower,however no scrubbing of the wound and no soaking of the wound, meaning no bathtubs or swimming pools. Pat dry only. Leave wound open to air.  Sutures are not absorbable and need to be removed in 2 weeks. If patient still at rehab by this time, the sutures may be removed by the rehab physician if he or she considers that the wound has healed completely.     Please call if you have:  1. increased pain, redness, drainage, swelling at your incision  2. fevers > 101.5 F degrees  3. with any questions or concerns.  You may reach the Neurosurgery clinic at [phone] during regular work hours. ER at [phone].    and ask for the Neurosurgery Resident on call at [phone], during off hours or weekends.         Discharge Disposition:     Discharged to home        Mohan Deywan  Neurosurgery Resident PGY2

## 2022-09-27 VITALS
RESPIRATION RATE: 16 BRPM | OXYGEN SATURATION: 97 % | WEIGHT: 200.18 LBS | SYSTOLIC BLOOD PRESSURE: 120 MMHG | BODY MASS INDEX: 32.17 KG/M2 | HEIGHT: 66 IN | DIASTOLIC BLOOD PRESSURE: 56 MMHG | HEART RATE: 83 BPM | TEMPERATURE: 96.4 F

## 2022-09-27 LAB
ALBUMIN UR-MCNC: 10 MG/DL
ANION GAP SERPL CALCULATED.3IONS-SCNC: 7 MMOL/L (ref 7–15)
APPEARANCE UR: CLEAR
BILIRUB UR QL STRIP: NEGATIVE
BUN SERPL-MCNC: 23.2 MG/DL (ref 8–23)
CALCIUM SERPL-MCNC: 8.3 MG/DL (ref 8.8–10.2)
CHLORIDE SERPL-SCNC: 110 MMOL/L (ref 98–107)
COLOR UR AUTO: ABNORMAL
CREAT SERPL-MCNC: 1.04 MG/DL (ref 0.67–1.17)
DEPRECATED HCO3 PLAS-SCNC: 26 MMOL/L (ref 22–29)
ERYTHROCYTE [DISTWIDTH] IN BLOOD BY AUTOMATED COUNT: 14.1 % (ref 10–15)
GFR SERPL CREATININE-BSD FRML MDRD: 76 ML/MIN/1.73M2
GLUCOSE SERPL-MCNC: 113 MG/DL (ref 70–99)
GLUCOSE UR STRIP-MCNC: NEGATIVE MG/DL
HCT VFR BLD AUTO: 30.4 % (ref 40–53)
HGB BLD-MCNC: 10.1 G/DL (ref 13.3–17.7)
HGB UR QL STRIP: ABNORMAL
KETONES UR STRIP-MCNC: NEGATIVE MG/DL
LEUKOCYTE ESTERASE UR QL STRIP: NEGATIVE
MAGNESIUM SERPL-MCNC: 2 MG/DL (ref 1.7–2.3)
MCH RBC QN AUTO: 31.3 PG (ref 26.5–33)
MCHC RBC AUTO-ENTMCNC: 33.2 G/DL (ref 31.5–36.5)
MCV RBC AUTO: 94 FL (ref 78–100)
MUCOUS THREADS #/AREA URNS LPF: PRESENT /LPF
NITRATE UR QL: NEGATIVE
PH UR STRIP: 5.5 [PH] (ref 5–7)
PHOSPHATE SERPL-MCNC: 3.5 MG/DL (ref 2.5–4.5)
PLATELET # BLD AUTO: 143 10E3/UL (ref 150–450)
POTASSIUM SERPL-SCNC: 3.8 MMOL/L (ref 3.4–5.3)
RBC # BLD AUTO: 3.23 10E6/UL (ref 4.4–5.9)
RBC URINE: 44 /HPF
SODIUM SERPL-SCNC: 143 MMOL/L (ref 136–145)
SP GR UR STRIP: 1.02 (ref 1–1.03)
UROBILINOGEN UR STRIP-MCNC: NORMAL MG/DL
WBC # BLD AUTO: 10.3 10E3/UL (ref 4–11)
WBC URINE: 2 /HPF

## 2022-09-27 PROCEDURE — 94660 CPAP INITIATION&MGMT: CPT

## 2022-09-27 PROCEDURE — 250N000013 HC RX MED GY IP 250 OP 250 PS 637: Performed by: NURSE PRACTITIONER

## 2022-09-27 PROCEDURE — 36415 COLL VENOUS BLD VENIPUNCTURE: CPT | Performed by: STUDENT IN AN ORGANIZED HEALTH CARE EDUCATION/TRAINING PROGRAM

## 2022-09-27 PROCEDURE — 999N000157 HC STATISTIC RCP TIME EA 10 MIN

## 2022-09-27 PROCEDURE — 85027 COMPLETE CBC AUTOMATED: CPT | Performed by: STUDENT IN AN ORGANIZED HEALTH CARE EDUCATION/TRAINING PROGRAM

## 2022-09-27 PROCEDURE — 82310 ASSAY OF CALCIUM: CPT | Performed by: STUDENT IN AN ORGANIZED HEALTH CARE EDUCATION/TRAINING PROGRAM

## 2022-09-27 PROCEDURE — 83735 ASSAY OF MAGNESIUM: CPT | Performed by: STUDENT IN AN ORGANIZED HEALTH CARE EDUCATION/TRAINING PROGRAM

## 2022-09-27 PROCEDURE — 250N000013 HC RX MED GY IP 250 OP 250 PS 637: Performed by: STUDENT IN AN ORGANIZED HEALTH CARE EDUCATION/TRAINING PROGRAM

## 2022-09-27 PROCEDURE — 84100 ASSAY OF PHOSPHORUS: CPT | Performed by: STUDENT IN AN ORGANIZED HEALTH CARE EDUCATION/TRAINING PROGRAM

## 2022-09-27 PROCEDURE — 250N000011 HC RX IP 250 OP 636: Performed by: STUDENT IN AN ORGANIZED HEALTH CARE EDUCATION/TRAINING PROGRAM

## 2022-09-27 RX ORDER — GABAPENTIN 300 MG/1
300 CAPSULE ORAL 2 TIMES DAILY
Status: DISCONTINUED | OUTPATIENT
Start: 2022-09-27 | End: 2022-09-27 | Stop reason: HOSPADM

## 2022-09-27 RX ORDER — GABAPENTIN 300 MG/1
900 CAPSULE ORAL AT BEDTIME
Status: DISCONTINUED | OUTPATIENT
Start: 2022-09-27 | End: 2022-09-27 | Stop reason: HOSPADM

## 2022-09-27 RX ORDER — GABAPENTIN 300 MG/1
600 CAPSULE ORAL DAILY
Status: DISCONTINUED | OUTPATIENT
Start: 2022-09-27 | End: 2022-09-27 | Stop reason: HOSPADM

## 2022-09-27 RX ADMIN — ACETAMINOPHEN 650 MG: 325 TABLET, FILM COATED ORAL at 03:35

## 2022-09-27 RX ADMIN — POLYETHYLENE GLYCOL 3350 17 G: 17 POWDER, FOR SOLUTION ORAL at 09:33

## 2022-09-27 RX ADMIN — SENNOSIDES 2 TABLET: 8.6 TABLET, FILM COATED ORAL at 09:31

## 2022-09-27 RX ADMIN — OXYCODONE HYDROCHLORIDE 5 MG: 5 TABLET ORAL at 09:31

## 2022-09-27 RX ADMIN — ACETAMINOPHEN 650 MG: 325 TABLET, FILM COATED ORAL at 09:31

## 2022-09-27 RX ADMIN — GABAPENTIN 300 MG: 300 CAPSULE ORAL at 11:14

## 2022-09-27 RX ADMIN — PANTOPRAZOLE SODIUM 40 MG: 40 TABLET, DELAYED RELEASE ORAL at 09:32

## 2022-09-27 RX ADMIN — OXYCODONE HYDROCHLORIDE 5 MG: 5 TABLET ORAL at 03:35

## 2022-09-27 RX ADMIN — Medication 2 G: at 09:34

## 2022-09-27 ASSESSMENT — ACTIVITIES OF DAILY LIVING (ADL)
ADLS_ACUITY_SCORE: 22

## 2022-09-27 NOTE — PLAN OF CARE
Arrived from: PACU  Belongings/meds: clothing, shoes, glasses  2 RN Skin Assessment Completed by: Bryan ISLAS and Svitlana FOLEY    Non-intact findings documented (yes/no/NA): anterior lateral head incision, old scar on R calf, dry skin    Status: pt admitted for DBS insertion for essential tremors  Vitals: VSS on 2 L cannula  Neuros: AOx4  IV: PIV NS @ 75 ml/hr  Labs/Electrolytes: see flowsheets  Resp/trach: LSC, on 2 L NC  Diet: clear liquids this shift d/t nausea. zofran given x1 this shift  Bowel status: BS+ LBM PTA  : straight cath'd for 400 at 2230, UA sent to lab. Void via urinal minimally after straight cath  Skin: see above  Pain: consistently rates pain a 7, tylenol and oxy given x1 this shift  Activity: up SBA with GB, steady  Social: daughter and son-in-law at bedside this shift  Plan: potential discharge tomorrow

## 2022-09-27 NOTE — PROVIDER NOTIFICATION
"MD notified, \"Pt has order for CPAP but requires CPAP securement around head near DBS insertions. Respiratory came and is wondering if it is okay to put CPAP on.\"  "

## 2022-09-27 NOTE — OP NOTE
PATIENT NAME:       Arley Butt  YOB: 1950  MRN:                         [id number]     DATE OF PROCEDURE: 09/26/2022     PREOPERATIVE DIAGNOSIS:    Essential tremor [G25.0]     POSTOPERATIVE DIAGNOSIS:    Essential tremor [G25.0]     PROCEDURES PERFORMED:   1.  Placement of stereotactic frame and neuronavigation planning.  2.  Stealth-assisted Left side deep brain stimulator placement.  3.  Placement of Left side deep brain stimulator electrode, target Left Ventral Intermediate nucleus of Thalamus with microelectrode recording.  4.  Intraoperative fluoroscopy.  5.  Electrical interrogation and analysis of deep brain stimulator system.     ATTENDING SURGEON: Manuel Otero MD     RESIDENT SURGEON: Mohan Dunne MD     ANESTHESIA:  Monitored anesthesia care and local anesthetic.      ESTIMATED BLOOD LOSS:  5cc     COMPLICATIONS:  None     FINDINGS:  All impedences normal. Bengun was rotated 30 degrees clockwise and 1mm to the right. Electrode placed in anterior bengun.     IMPLANTS:    Implant Name Type Inv. Item Serial No.  Lot No. LRB No. Used Action   KIT AMILCAR HOLE COVER VERCISE DBS [id number] - [id number] Metallic Hardware/[place] KIT AMILCAR HOLE COVER VERCISE DBS [id number]   [employer] [id number] Left 1 Implanted   DBS DIRECTIONAL LEAD STERILE KIT 45CM [id number]-2202-45 - [id number] Leads DBS DIRECTIONAL LEAD STERILE KIT 45CM [id number] [employer]   Left 1 Implanted      INDICATIONS FOR PROCEDURE:  70 y/o right handed gentleman with ET referred for neurosurgical consideration for DBS from the [facility]. See my prior note for details. In brief, he has had bilateral hand tremors, R>L for a few years, but it has significantly worsened over the last year or so. Has tried primidone and propranolol without benefit. It is interfering significantly with his daily living activities including eating, drinking, writing. Not on antiplatelets or  anticoagulation. Patient was offered DBS stimulation electrode implantation procedure for his symptoms. All the risks and benefits were discussed with the patient in detail. He consented to undergo the above-mentioned procedure and today, presents for the same.       DESCRIPTION OF PROCEDURE:       CRW head frame placement and surgical planning for stereotactic neuronavigation.     Informed consent was obtained.  In the pre-operative area, he was identified and a brief timeout was performed for the placement of the CRW head frame. The intended pin sites, two in the front and two in the back of the head, were cleaned and were injected with local anesthetic, 1% lidocaine with epinephrine.  A total of 20cc were used during this portion of the surgical case.  The CRW stereotactic head frame was placed onto the head with 4 pins for fixation.  Care was taken so that the fiducial box would fit over the head and the frame.  Once the head frame was on, the fiducial box was easily placed without interference from his forehead. After the CRW stereotactic head frame was placed, the patient was taken directly to the CT scanner, at which time CT scan of the head stereotactic protocol was obtained.  Subsequently, the patient was taken back up to the operating room where he was placed supine on the operative bed with the CRW head frame affixed to the bed as well. Patient was in a slight sitting up position with the neck in a neutral position and he was made comfortable.  The bed was positioned so that it would be a reclining chair position. Of note, before the patient's head frame was affixed to the table, he was sedated and a dominguez catheter was placed.      All pressure points were well padded.  Care was taken to make sure that the neck was in neutral position and that the Vazquez head patino device had room for manipulation in case more flexion or extension was needed throughout the case.       At this time, attention was turned  to the neuronavigation imaging that was obtained.  The Stealth neuronavigation device was loaded with the CT head that was obtained immediately prior to the operation. The device also had an MRI of the head, stereotactic protocol, that was obtained prior to the surgery.  We used the Stealth MRI to assist with the localization and targeting of the left ventral intermediate nucleus (ViM) of thalamus. The CT head was merged with the previously obtained MRI of the brain.  The merge demonstrated good coherence/registration.  Then, using this merged image, neuronavigation was used to stereotactically target the left ViM thalamus. The technique used was AC-PC line localization technique to target left ViM thalamus using the stereotactic coordinates, and the XYZ as well as a ring and arc angles for the CRW head frame were obtained for the left side.  We also planned so that the anterior Romain Gun would be the target trajectory, with center and posterior Romain Gun positions used for additional mapping data.  Final plan was formulated.  The entry into the skull, as well as the trajectory of the electrode were checked with the probe's eye view to avoid any sulci, ventricle or vascular structures.     The stereotactic coordinates for the left side was then transferred to the [facility] stereotactic targeting apparatus as well as the phantom base.  The coordinates were confirmed and double checked for accuracy.  Accuracy was also confirmed using the CR's phantom base.     At this time, attention was turned back to the patient.  Using a hair clipper, hair over the left scalp area was clipped.  A semicircular C-shaped incision was planned on the left side and this was marked.  Then, the surgical area was prepped widely and draped in routine surgical fashion.     Timeout was then performed confirming the patient's identity, procedure to be performed, side and site of surgery identified, imaging corresponding with the patient and  administration of preoperative prophylactic antibiotic.      Left-sided electrode placement with microelectrode recording: Target Left ViM Thalamus      The CRW targeting apparatus was attached to the head frame on top of the sterile drapes.  The entry point was marked on the scalp on the left side.  A C-shaped incision was made with a skin knife, after the area was injected with local anesthetic, 1% Lidocaine with epinephrine and 1/4% Marcaine plain, 50:50 mix.  The area posterior to the incision was also injected as a pocket would be created.  Area posterior lateral, towards the left parietal area was also injected as the electrode connector will be tunneled here later. Total of 19 mL of the above mentioned local anesthetic was used.  The incision was then further carried down to the pericranium.  Hemostasis was obtained using monopolar and bipolar cautery.  Thin layer of pericranial tissue was left behind on the scalp and the skin flap was reflected posteriorly.  Then, using a blunt dissection technique, a pocket was created posteriorly as well as a track that was made towards the left parietal area for later use.     At this time, the targeting apparatus again positioned and the entry point to the skull was marked.  Area of the intended jazlyn hole was cleaned and pericranial tissue removed.  Then using a 5-cutter drill, jazlyn hole was created over this entry point to expose the dura.  The area was vigorously irrigated and bone wax used to control the bone bleeding.        At this time, the electrode fixation cover was fixed to the skull using two screws.  Care was taken to overlap the pericranium over the edge of the cover to provide a smooth tissue transition. Dura was coagulated with bipolar cautery for hemostasis, and again no active bleeding was noted.  Then, the electrode drive was attached to the targeting apparatus.  The entry into the dura was again checked.  It appeared that all positions of the Romain Gun  electrode patino were accessible without any interference from the bone edge.  Then using a #11 blade, opening was made into the dura, as well as a small corticectomy.  No active bleeding was noted.     Team from the Neurology Department participated in the recording and neurological testing.  During the microelectrode recording and testing, the neurology team took detailed notes of the electrophysiologic data and neurologic exam as well as any stimulation effects.     At this time, the electrode guide tubes were inserted in the center and posterior Romain Gun positions and they were advanced slowly until they were fully inserted at which point the tips would be well above the target.  No abnormal resistance was noted.  Duraseal was used to seal the entry site to provide a seal and to minimize cerebrospinal fluid leak and air entry.  Then, recording microelectrodes were brought in and placed within the guide tubes and they were connected for intraoperative recording.  The tips of the electrode were now 15 mm above target.  The patient was awakened.  Then, using a micro drive, the electrodes were slowly advanced towards the target, collecting microelectrode recording data for mapping the track.     Based on the mapping data, it was determined that the Romain Gun was needed to be rotated 30 degrees clockwise and shifted 1mm medial- and this may be the best placement for the permanent electrode.  The electrode drive was then positioned to the desired position with the micro drive (after placing the central cannula and removing the anterior, lateral and posterior cannulas) and the electrodes pulled out of the guide tubes.  The permanent DBS electrode was brought into the field and placed in the anterior guide tube with the tip being placed at the depth of 0.0 mm at target.  The electrode demonstrated good impedance values.  The electrode was tested and stimulation showed high threshold for adverse side effects.  No further  testing was done.  Based on the results, it was thought that the current location may be the best location of the permanent electrode.     Final frame coordinates:  X: -14.8mm  Y: +8.3mm  Z: -0.6mm     Depth Adjustment: 0.0mm at target  Romain-Gun track used: Anterior     Final angles:  Rin.1 degrees Ant  Arc: 17.1 degrees Left  AP: 58.5 degrees  MSP: 20.2 degrees     With the satisfactory testing of the electrode position and the stimulation parameters, the electrode was secured at this location.  The patient was again made comfortable.  The guide cannulae were gently removed from the brain.  Duraseal was again used to seal any opening.  The area was generously irrigated.  Hemostasis was also obtained.  Fluoroscopy was brought into the field to test that the electrode did not move with each manipulation.  The electrode tip was seen to be near the target, as expected.  The electrode clamp (rubber shodded bayonet) was applied to hold the electrode. Then, the electrode stylet was removed. The electrode was then removed from the electrode patino. The cap cover was finally placed and secured in place.  Fluoroscopy confirmed that the tip of the electrode did not change in position with each manipulation.  The directional marker, on fluoroscopy, also demonstrated that the contact was facing anteriorly.     The connecting portion of the electrode was covered with a protective covering/dead-end connector, and this apparatus was inserted into the subgaleal space/pocket towards the left parietal area that was created at the beginning of the case.  The excess wire was also buried posteriorly in the previously created pocket.  Fluoroscopy confirmed that the tip did not move.  The wound and subgaleal pocket were then generously irrigated.     The galeal layer was reapproximated using 3-0 Vicryl sutures and the skin was reapproximated using 4-0 Monocryl suture in a running fashion.  The wound was cleaned and dried and prepped  with ChoraPrep, and covered with primapore dressing.     Removal of the head frame and end of procedure     First, the stereotactic targeting apparatus was removed.  Then, the sterile drapes were removed.  Subsequently, the patient was taken out of the CRW head frame.  The four pin sites were clean and dry and no active bleeding was noted.  Antibiotic ointment was applied to the pin sites.     Patient was further awakened and taken to the recovery room in a stable condition.  At the end of the case, all counts including needle, sponge and instrument counts were correct x 2.  There were no complications.     Dr. Otero, Neurosurgery attending, was present and scrubbed for the entire case and performed the critical portions of the case.     --  Mohan Dunne M.D.  Neurosurgery Resident, PGY-2    Physician Attestation   I was present for the entire procedure between opening and closing.    Manuel Otero MD  Date of Service (when I saw the patient): 9/26/22

## 2022-09-27 NOTE — PLAN OF CARE
Vitals: VSS on RA, continuous pulse ox in place.  Neuros: Alert and oriented x4. 5/5 throughout.   IV: Removed for discharge  Labs/Electrolytes: WNL  Resp/trach: WNL on RA  Diet: Regular diet, tolerating well. Denied nausea.  Bowel status: BS+x4, bowel meds given.  : Unable to void this am- small amount of bright red blood in hat with small amount of urine, bladder scan 561, Dr. Otero notified. Pt walked and attempted to void again and voided 600ml with 2 small clots-bladder scan 0. NSG notified and ok to discharge.  Skin: Head incision and 4 pin sites DIONTE, front left pin site with small amount of drainage, MD notified and primapore placed.  Pain: C/o incisional/head pain. Prn tylenol and oxycodone given and effective per pt.  Activity: Up with SBA, ambulated in hallway x1.  Social: Daughter and son-in-law present at bedside.    Discharge time/date: 9/27/22, 1140  Walked or Wheelchair: Wheelchair  PIV removed: yes  Reviewed AVS with patient: yes and daughter  Medication due times added to AVS in EPIC: yes  Verbalized understanding of discharge with teachback: yes  Medications retrieved from pharmacy: yes  Supplies sent home: yes, primapores for pin site  Belongings from security with patient: N/A

## 2022-09-27 NOTE — PLAN OF CARE
Status: pt admitted for DBS insertion for essential tremors  Vitals: Soft BP on 1 L NC. Pt tried CPAP overnight but did not like it, switched back to NC.   Neuros: AOx4. No N/T noted. Strength 5/5 throughout. Pupils sluggish.   IV: PIV NS @ 75 ml/hr  Labs/Electrolytes: WNL.  Resp/trach: O2 sats within parameters on NC. LS clear and equal.   Diet: clear liquids this shift, not yet advanced.  Bowel status: BS+ LBM PTA.  : Bladder scanned @330 for 255mL. @0630 pt tried voiding with minimal output, yellow and pink tinged. Bladder scanned for 348mL. Pt not cath until more fluids and ambulating.   Skin: Anterior lateral head incisions with marked dressings. Dressings taken off this AM by neuro team. R calf old scar.   Pain: 5/10 for head. Tylenol and oxy given x1 this shift  Activity: up SBA with GB, steady  Plan: Discharge today if patient is able to void spontaneously.

## 2022-09-28 ENCOUNTER — PATIENT OUTREACH (OUTPATIENT)
Dept: NEUROSURGERY | Facility: CLINIC | Age: 72
End: 2022-09-28

## 2022-09-28 NOTE — PROGRESS NOTES
[facility]: Post-Discharge Note  SITUATION                                                      Admission:    Admission Date: 09/26/22   Reason for Admission: stealth assisted Left side deep brain stimulator placement, phase I, placement of left side deep brain stimulator electrode, target left ventral intermediate nucleus of the thalamus, with microelectrode recording  Discharge:   Discharge Date: 09/27/22  Discharge Diagnosis: Essential tremor    BACKGROUND                                                      Per hospital discharge summary and inpatient provider notes:  Neurosurgery Discharge Coordination Note     Attending physician: Dr. Otero  Discharge to: Home     Current status: Spoke with pt's daughter Svitlana. Pt is having headache and incision pain that he rates 5-7 out of 10. Taking Oxycodone every 6 hours with Tylenol in between with adequate relief, though daughter says pt is still uncomfortable. She understands that no pain medication will eliminate pain, but that it should start to improve around 72 hours post surgery. Denies redness, swelling, increased tenderness, drainage, incision opening at incision site but pt's front left pin site has light amount serosanguinous drainage. Dressing over pin site is being changed as needed. Reports Incision CDI without signs of infection.  Denies current bladder issues. Pt has not had a bowel movement yet. Good PO per daughter. He is taking senna-docusate and Miralax as directed. Discussed making sure his fluid intake is good, adding fiber to the diet in the form of fruits/vegetables, prunes/prune juice, limiting dairy and making sure he is taking short, frequent walks. Daughter expressed understanding.     Pt's temp this morning was 100.8, no chills or other s/s. Will continue to monitor. Pt has a stiff neck, likely from head frame being attached to surgical bed. Can try warm packs, should improve with time.     Discharge instructions and medications  reviewed with patient/s daughter.  Follow up appointments/imaging/tests needed: DBS battery placement is 10/4/22 (not 10/3 as initially scheduled), at 2:25 p.m., arrive on unit 3C at 12:25 p.m. Reviewed fasting, medication and showering instructions.     I will check in with pt's daughter tomorrow.     RN triage/on call number given: [phone]/ [phone]        ASSESSMENT           Discharge Assessment  How are your symptoms? (Red Flag symptoms escalate to triage hotline per guidelines): Improved  Do you feel your condition is stable enough to be safe at home until your provider visit?: Yes  Does the patient have their discharge instructions? : Yes  Does the patient have questions regarding their discharge instructions? : No  Were you started on any new medications or were there changes to any of your previous medications? : Yes  Does the patient have all of their medications?: Yes  Do you have questions regarding any of your medications? : No  Discharge follow-up appointment scheduled within 14 calendar days? : Yes  Discharge Follow Up Appointment Date:  (Phase II - DBS battery placement 10/4/22)         Post-op (Clinicians Only)  Did the patient have surgery or a procedure: Yes  Incision: closed;healing  Drainage: Yes (head frame pin site)  Drainage Color: serosanguinous  Incision Drainage Amount: light  Fever: Yes (100.8)  Chills: No  Redness: No  Warmth: No  Swelling: No  Incision site pain: Yes  Closure: suture;dissolving  Eating & Drinking: eating and drinking without complaints/concerns  PO Intake: regular diet  Bowel Function: constipation  Urinary Status: voiding without complaint/concerns        PLAN                                                      Outpatient Plan:  Routine postop follow-up      Future Appointments   Date Time Provider Department Center   9/30/2022 11:00 AM MG COVID LAB [facility]   10/18/2022 12:00 PM Manuel Otero MD [facility]   10/24/2022 10:00 AM Elian  MD [facility]   10/24/2022  1:20 PM [id number]         For any urgent concerns, please contact our 24 hour nurse triage line: [phone] ([phone])         JEREMIE DAWSON RN

## 2022-09-29 NOTE — PROGRESS NOTES
In follow-up to my call to pt's daughter yesterday, daughter said that pt had a soft bowel movement today. Constipation has been a concern, so pt feeling better in this regard.    Pt is still having postoperative headache and incision pain at 5-7 on a scale of 10. Pt taking 1 tablet oxycodone approximately every 6 hours and supplementing with Tylenol between doses. Pt did not wake up in the night to take an oxy, so pain was 7/10 in the morning and it took some time to get it down. Pt has 5 tablets left and will need a refill before the weekend. I have messaged Dr. Otero and will follow-up with daughter when I hear back.     Pt had a brief dizzy spell today, but sat down for a few minutes and was improved enough to go on a walk with spouse.     Daughter requested that preop covid-19 test be moved from 9/30 to 10/3. Next surgery is on 10/4, so that only leaves a day to result the test. I called covid scheduling and the  recommended against changing the appointment, which I relayed to pt's daughter. Leaving the test as scheduled on 9/30.     No further questions at this time.

## 2022-09-30 ENCOUNTER — MYC MEDICAL ADVICE (OUTPATIENT)
Dept: NEUROSURGERY | Facility: CLINIC | Age: 72
End: 2022-09-30

## 2022-09-30 ENCOUNTER — LAB (OUTPATIENT)
Dept: LAB | Facility: CLINIC | Age: 72
End: 2022-09-30
Payer: COMMERCIAL

## 2022-09-30 ENCOUNTER — TELEPHONE (OUTPATIENT)
Dept: NEUROSURGERY | Facility: CLINIC | Age: 72
End: 2022-09-30

## 2022-09-30 DIAGNOSIS — G25.0 ESSENTIAL TREMOR: ICD-10-CM

## 2022-09-30 DIAGNOSIS — Z20.822 ENCOUNTER FOR LABORATORY TESTING FOR COVID-19 VIRUS: ICD-10-CM

## 2022-09-30 LAB — SARS-COV-2 RNA RESP QL NAA+PROBE: NEGATIVE

## 2022-09-30 PROCEDURE — U0005 INFEC AGEN DETEC AMPLI PROBE: HCPCS

## 2022-09-30 PROCEDURE — U0003 INFECTIOUS AGENT DETECTION BY NUCLEIC ACID (DNA OR RNA); SEVERE ACUTE RESPIRATORY SYNDROME CORONAVIRUS 2 (SARS-COV-2) (CORONAVIRUS DISEASE [COVID-19]), AMPLIFIED PROBE TECHNIQUE, MAKING USE OF HIGH THROUGHPUT TECHNOLOGIES AS DESCRIBED BY CMS-2020-01-R: HCPCS

## 2022-09-30 RX ORDER — OXYCODONE HYDROCHLORIDE 5 MG/1
5 TABLET ORAL EVERY 6 HOURS PRN
Qty: 12 TABLET | Refills: 0 | Status: SHIPPED | OUTPATIENT
Start: 2022-09-30 | End: 2022-10-06

## 2022-09-30 NOTE — TELEPHONE ENCOUNTER
"Spoke with pt's daughter Svitlana and let her know the oxycodone refill was sent to the [facility] this morning.     Svitlana said that pt has increased swelling beneath both eyes \"that look like blisters.\" I let her know that swelling and fluid shift can happen after surgery and head frame placement, and it will absorb over time. She is concerned b/c to her it appears that the swollen areas are blisters. Requested that she send photos through [employer].     The post-surgery photo does show swelling below both eyes, but the skin does not appear blistered. She said that the swollen areas are soft and skin is intact and it is not painful.     I have sent a request to Dr. Otero to review the photos, and I will get back to pt's daughter when I hear back from him. They are in agreement with plan. Nothing further at this time.   "

## 2022-09-30 NOTE — TELEPHONE ENCOUNTER
I spoke with Svitlana to let her know Dr. Otero reviewed the postop photo and is not concerned about the swelling. It is within the realm of normal following DBS surgery and will resolve on its own. Pt may use cold compresses or an ice pack (barrier between skin and ice, 10-15 minutes per hour) if he wants to, but again, it will resolve over time.     If pt has concerns over the weekend, he/family may call the on-call neurosurgery resident. Svitlana confirmed she has the number.     No further questions at this time.

## 2022-10-03 ENCOUNTER — ANESTHESIA EVENT (OUTPATIENT)
Dept: SURGERY | Facility: CLINIC | Age: 72
End: 2022-10-03
Payer: COMMERCIAL

## 2022-10-03 ENCOUNTER — HEALTH MAINTENANCE LETTER (OUTPATIENT)
Age: 72
End: 2022-10-03

## 2022-10-03 ENCOUNTER — TELEPHONE (OUTPATIENT)
Dept: NEUROSURGERY | Facility: CLINIC | Age: 72
End: 2022-10-03

## 2022-10-03 ASSESSMENT — LIFESTYLE VARIABLES: TOBACCO_USE: 1

## 2022-10-03 NOTE — ANESTHESIA PREPROCEDURE EVALUATION
Pre-Operative H & P     CC:  Preoperative exam to assess for increased cardiopulmonary risk while undergoing surgery and anesthesia.    Date of Encounter: 9/12/2022  Primary Care Physician:  Jocelyn Reyna     Reason for visit: pre-op evaluation. Essential tremor    HPI  Arley Butt is a 72 year old male who presents for pre-operative H & P in preparation for  Procedure Information     Case: [id number] Date/Time: 10/04/22 1425    Procedure: Deep brain stimulator placement, phase II, placement of deep brain stimulator generator/battery over the left chest wall (Left Chest)    Anesthesia type: General    Diagnosis: Essential tremor [G25.0]    Pre-op diagnosis: Essential tremor [G25.0]    Location:  OR [address] [place] [address] OR    Providers: Manuel Traore MD        Second phase of surgery is placement of battery under general anesthesia, same day surgery, with Dr. Manuel Traore, on 10/3/22, at [facility].       HISTORY OF PRESENT ILLNESS  Mr Butt is a 71 y/o gentleman with essential tremor referred for neurosurgical consideration for DBS from the [facility].   He has had bilateral hand tremors, R>L for a few years. There was significant worsening over the last year or so.  It is interfering significantly with eating, drinking and writing.      Co morbidities include a history of alcohol dependence - he quit 8 years ago. He has HTN (Prazosin), HLD (Zocor), prior smoking history, DVT, Follicular lymphoma, GERD, PTSD, anxiety, Mild cognitive impairment.     History is obtained from the patient and chart review and his daughter, who is present and can give details.    Hx of abnormal bleeding or anti-platelet use: no      Past Medical History  Past Medical History:   Diagnosis Date     Alcohol dependence (H) - quit 8 years ago      Anemia      Anxiety      BPH (benign prostatic hyperplasia)      Cataract      Chronic pain      DVT (deep venous thrombosis) (H)      Dysphagia      Dysthymia      Erosive gastritis       Follicular lymphoma (H)      Foot sprain      Gastroesophageal reflux disease with esophagitis      HTN (hypertension)      Hyperlipidemia      MCI (mild cognitive impairment)      Osteoarthritis      Presbyopia      PTSD (post-traumatic stress disorder)      Sensorineural hearing loss, bilateral      Tinnitus, bilateral      Urinary frequency        Past Surgical History  Past Surgical History:   Procedure Laterality Date     ANKLE SURGERY/metal plate with 4 pins Right      BACK SURGERY      lower back, herniated disc     CYSTECTOMY      pilonidal     VASECTOMY         Prior to Admission Medications  Current Outpatient Medications   Medication Sig Dispense Refill     acetaminophen (TYLENOL) 500 MG tablet Take 500-1,000 mg by mouth every 8 hours as needed for mild pain       Carboxymethylcellulose Sodium 1 % GEL Apply 1 drop to eye nightly as needed       cyproheptadine (PERIACTIN) 4 MG tablet Take 12 mg by mouth At Bedtime       finasteride (PROSCAR) 5 MG tablet Take 1 tablet by mouth daily       folic acid (FOLVITE) 1 MG tablet Take 1 tablet by mouth daily       gabapentin (NEURONTIN) 300 MG capsule Takes 300 mg capsule and 600 mg tablet (see other order as well)   Dosing as follows:    AM:  300 mg + 600 mg = 900 mg total  Afternoon: 300 mg + 600 mg = 900 mg total  Evenin mg x2 + 600 mg = 1200 mg total  Bedtime: 300 mg x3 + 600 mg = 1200 mg total       gabapentin (NEURONTIN) 600 MG tablet Takes 300 mg capsule and 600 mg tablet (see other order as well)   Dosing as follows:    AM:  300 mg + 600 mg = 900 mg total  Afternoon: 300 mg + 600 mg = 900 mg total  Evenin mg x2 + 600 mg = 1200 mg total  Bedtime: 300 mg x3 + 600 mg = 1200 mg total       mirtazapine (REMERON) 30 MG tablet Take 15 mg by mouth daily       NONFORMULARY Nocturnal CPAP with 2L oxygen       oxyCODONE (ROXICODONE) 5 MG tablet Take 1 tablet (5 mg) by mouth every 6 hours as needed for pain 12 tablet 0     pantoprazole (PROTONIX) 40 MG EC  tablet Take 40 mg by mouth 2 times daily       polyethylene glycol (MIRALAX) 17 g packet Take 17 g by mouth daily 30 packet 0     prazosin (MINIPRESS) 5 MG capsule Take 10 mg by mouth At Bedtime       propylene glycol (SYSTANE BALANCE) 0.6 % SOLN ophthalmic solution Apply 1 drop to eye 4 times daily as needed       senna (SENOKOT) 8.6 MG tablet Take 1 tablet by mouth daily       SENNA-docusate sodium (SENNA S) 8.6-50 MG tablet Take 2 tablets by mouth 2 times daily 60 tablet 0     sertraline (ZOLOFT) 100 MG tablet Take 150 mg by mouth daily       simvastatin (ZOCOR) 40 MG tablet Take 0.5 tablets by mouth At Bedtime       tamsulosin (FLOMAX) 0.4 MG capsule Take 1 capsule by mouth daily       triamcinolone (KENALOG) 0.1 % external cream Apply 1 Film topically daily as needed         Allergies  No Known Allergies    Social History  Social History     Socioeconomic History     Marital status:    Tobacco Use     Smoking status: Former Smoker     Smokeless tobacco: Never Used   Substance and Sexual Activity     Alcohol use: Alcohol dependence - quit 8 years ago     Drug use: No     Family History  Family History   Problem Relation Age of Onset     Tremor Father        Review of Systems  The complete review of systems is negative other than noted in the HPI or here.   Anesthesia Evaluation   Pt has had prior anesthetic. Type: General.        ROS/MED HX  ENT/Pulmonary: Comment: CPAP with oxygen    (+) sleep apnea, uses CPAP, tobacco use, Past use, 10  Pack-Year Hx,      Neurologic: Comment: Diagnosed at the [facility] last fall 2021    (+) TIA, date: 2021, features: dizziness, tremor.     Cardiovascular:     (+) Dyslipidemia hypertension-----fainting (syncope). Previous cardiac testing   Echo: Date: Results:    Stress Test: Date: 2021 Results:  Lexiscan normal perfusion study, no inducible ischemia.  Repeat stress test: chemical stress test - [facility] - pt reports as norml  ECG Reviewed: Date: Results:    Cath:  Date: Results:      METS/Exercise Tolerance:     Hematologic:  - neg hematologic  ROS     Musculoskeletal: Comment: tremors      GI/Hepatic:  - neg GI/hepatic ROS     Renal/Genitourinary:  - neg Renal ROS     Endo:  - neg endo ROS     Psychiatric/Substance Use: Comment: Quit drinking 8 years ago    (+) psychiatric history anxiety     Infectious Disease:  - neg infectious disease ROS     Malignancy: Comment: Lymphoma treated at the [facility]  ~ 6 years ago  Last follow up 3 years ago - in remission/ released from clinic  (+) Malignancy, History of Lymphoma/Leukemia.Lymph CA Remission status post Chemo.        Other:            There were no vitals taken for this visit.    Physical Exam   Constitutional: Awake, alert, cooperative, no apparent distress, and appears stated age.  Eyes: Pupils equal, round and reactive to light,  sclera clear, conjunctiva normal.  HENT: Normocephalic, oral pharynx with moist mucus membranes, chipped posterior dentition. No goiter appreciated.   Respiratory: Clear to auscultation bilaterally, no crackles or wheezing.  Cardiovascular: Regular rate and rhythm, normal S1 and S2, and no murmur noted.  Carotids +2, no bruits. No LE edema. Palpable pulses to radial  DP and PT arteries.   GI: Normal bowel sounds, soft, non-distended, non-tender, no masses palpated, no hepatosplenomegaly.    Lymph/Hematologic: No cervical lymphadenopathy and no supraclavicular lymphadenopathy.  Genitourinary:  deferred  Skin: Warm and dry.  No rashes at anticipated surgical site.   Musculoskeletal: Full ROM of neck. There is no redness, warmth, or swelling of the joints. Gross motor strength is normal.    Neurologic: Awake, alert, oriented to name, place and time. Cranial nerves II-XII are grossly intact. Gait is normal.   Neuropsychiatric: Calm, cooperative. Normal affect.     Prior Labs/Diagnostic Studies   All labs and imaging personally reviewed     2021 [facility] stress test: Lexiscan revealed normal perfusion and  no inducable ischemia.    8/25/22: Repeat Stress [facility]: for dyspnea:   Stress ECG with no significant ST changes or arrhythmias concerning for   inducible ischemia.     Overall left ventricular systolic function is normal calculated at 69%   without regional wall motion abnormalities.     Myocardial perfusion imaging is normal without evidence of infarct or   ischemia.         MRI: Moderate cortical atrophy, minimal subcortical white matter ischemic disease. Moderate size lateral ventricles    The patient's records and results personally reviewed by this provider.     Outside records reviewed from: Care Everywhere    LAB/DIAGNOSTIC STUDIES TODAY:    Ordered CBC, BMP, Type and screen    Assessment      Arley Butt is a 72 year old male seen as a PAC referral for risk assessment and optimization for anesthesia.    Plan/Recommendations  Pt will be optimized for the proposed procedure.  See below for details on the assessment, risk, and preoperative recommendations    NEUROLOGY  - History of TIA - patient and daughter report a diagnosis of TIA after evaluation for episode of dizziness and tremor -seen at [facility] - years ago - no recurrence. No focal neuro deficit reported.    -Post Op delirium risk factors:  History of pre-existing cognitive dysfunction - Mild cognitive impairment    ENT  - No current airway concerns.  Will need to be reassessed day of surgery.  Mallampati: I  TM: > 3    CARDIAC risk factors HTN, HLD, smoking history.  - Hypertension - /87  - HLD on Zocor    - METS (Metabolic Equivalents)  Patient performs 4 or more METS exercise without symptoms  - he does woodworking and lifts drawers, walks x 1 hour at the [employer] 3 xweek and climbs 15 stairs without CP or COOK.         RCRI- Total Score: = 1 ( if patient did have a TIA)  reflecting 0.9% risk of major adverse cardiac event complication rate    Stress test in August 2022  for dyspnea was negative           PULMONARY  - Obstructive Sleep  "Apnea  NASIMA with home CPAP.  Patient will be instructed to bring their home CPAP device to the hospital with them. Nightly oxygen with CPAP  No other pulmonary diagnoses     - Tobacco History      History   Smoking Status     Former Smoker 1/2 PPD since age 17- quit in 1980   Smokeless Tobacco     Never Used       GI  - GERD  Controlled on medications: Proton Pump Inhibitor  PONV Medium Risk  Total Score: 2           1 AN PONV: Patient is not a current smoker    1 AN PONV: Intended Post Op Opioids        /RENAL  - Baseline Creatinine  No recent lab. BMP today  -BPH on proscar    ENDOCRINE    - BMI: Estimated body mass index is 32.31 kg/m  as calculated from the following:    Height as of 9/26/22: 1.676 m (5' 6\").    Weight as of 9/26/22: 90.8 kg (200 lb 2.8 oz).  Obesity (BMI >30)  - No history of Diabetes Mellitus    HEME    DVT 6 years ago in the setting of lymphoma. No current anticoagulation.  Follicular lymphoma 6 years ago S/P chemotherapy. Last follow up 3 years ago - in remission and released from clinic.    VTE risk is elevated due to previous DVT:  Total score = 4 reflecting 1.8% risk           - No history of abnormal bleeding or antiplatelet use.      PSYCH  Anxiety. PTSD. Managed with zoloft and remeron. Pt stable.     Patient was discussed with Dr Wells    The patient is optimized for their procedure. AVS with information on surgery time/arrival time, meds and NPO status given by nursing staff. No further diagnostic testing indicated.      On the day of service:     Prep time: 20 minutes  Visit time: 25 minutes  Documentation time: 30 minutes  ------------------------------------------  Total time: 75 minutes      ZAKI Elder  Preoperative Assessment Center  [facility]  Cl[facility] and [facility]  Phone: [phone]  Fax: [phone]    Physical Exam    Airway        Mallampati: III   TM distance: > 3 FB   Neck ROM: full   Mouth opening: > 3 cm    Respiratory " Devices and Support         Dental  no notable dental history         Cardiovascular   cardiovascular exam normal          Pulmonary   pulmonary exam normal                  Anesthesia Plan    ASA Status:  3   NPO Status:  NPO Appropriate    Anesthesia Type: General.   Induction: Intravenous.   Maintenance: Balanced.        Consents    Anesthesia Plan(s) and associated risks, benefits, and realistic alternatives discussed. Questions answered and patient/representative(s) expressed understanding.     - Discussed: Risks, Benefits and Alternatives for BOTH SEDATION and the PROCEDURE were discussed     - Discussed with:  Patient      - Extended Intubation/Ventilatory Support Discussed: No.      - Patient is DNR/DNI Status: No    Use of blood products discussed: No .     Postoperative Care    Pain management: IV analgesics.   PONV prophylaxis: Ondansetron (or other 5HT-3), Dexamethasone or Solumedrol     Comments:           H&P reviewed: Unable to attach H&P to encounter due to EHR limitations. H&P Update: appropriate H&P reviewed, patient examined. No interval changes since H&P (within 30 days).

## 2022-10-03 NOTE — TELEPHONE ENCOUNTER
Pt's daughter Svitlana called to ask if low blood pressure is normal after DBS lead placement surgery. I let her know that low blood pressure is not a normal side effect after surgery and that they should call pt's PCP. Pt does take a daily antihypertensive (prazosin 5 mg). She said that pt is well hydrated. BPs  Since last Friday have been as low as 73/48 and generally in the 80s/50s with a high of 111/68. Daughter agreed to call pt's PCP today.     Preop instructions were reviewed with daughter, including fasting and showering instructions.     No further questions at this time.

## 2022-10-04 ENCOUNTER — ANESTHESIA (OUTPATIENT)
Dept: SURGERY | Facility: CLINIC | Age: 72
End: 2022-10-04
Payer: COMMERCIAL

## 2022-10-04 ENCOUNTER — HOSPITAL ENCOUNTER (OUTPATIENT)
Facility: CLINIC | Age: 72
Discharge: HOME | End: 2022-10-04
Attending: NEUROLOGICAL SURGERY | Admitting: NEUROLOGICAL SURGERY
Payer: COMMERCIAL

## 2022-10-04 VITALS
RESPIRATION RATE: 16 BRPM | HEIGHT: 66 IN | WEIGHT: 197.75 LBS | HEART RATE: 91 BPM | DIASTOLIC BLOOD PRESSURE: 110 MMHG | TEMPERATURE: 98 F | OXYGEN SATURATION: 96 % | BODY MASS INDEX: 31.78 KG/M2 | SYSTOLIC BLOOD PRESSURE: 135 MMHG

## 2022-10-04 DIAGNOSIS — G25.0 ESSENTIAL TREMOR: ICD-10-CM

## 2022-10-04 LAB — GLUCOSE BLDC GLUCOMTR-MCNC: 107 MG/DL (ref 70–99)

## 2022-10-04 PROCEDURE — 250N000011 HC RX IP 250 OP 636: Performed by: ANESTHESIOLOGY

## 2022-10-04 PROCEDURE — 250N000009 HC RX 250

## 2022-10-04 PROCEDURE — C1778 LEAD, NEUROSTIMULATOR: HCPCS | Performed by: NEUROLOGICAL SURGERY

## 2022-10-04 PROCEDURE — 61886 IMPLANT NEUROSTIM ARRAYS: CPT | Mod: 58 | Performed by: NEUROLOGICAL SURGERY

## 2022-10-04 PROCEDURE — C1820 GENERATOR NEURO RECHG BAT SY: HCPCS | Performed by: NEUROLOGICAL SURGERY

## 2022-10-04 PROCEDURE — 999N000141 HC STATISTIC PRE-PROCEDURE NURSING ASSESSMENT: Performed by: NEUROLOGICAL SURGERY

## 2022-10-04 PROCEDURE — 370N000017 HC ANESTHESIA TECHNICAL FEE, PER MIN: Performed by: NEUROLOGICAL SURGERY

## 2022-10-04 PROCEDURE — 258N000003 HC RX IP 258 OP 636

## 2022-10-04 PROCEDURE — 82962 GLUCOSE BLOOD TEST: CPT

## 2022-10-04 PROCEDURE — 272N000001 HC OR GENERAL SUPPLY STERILE: Performed by: NEUROLOGICAL SURGERY

## 2022-10-04 PROCEDURE — 250N000011 HC RX IP 250 OP 636: Performed by: NEUROLOGICAL SURGERY

## 2022-10-04 PROCEDURE — 710N000010 HC RECOVERY PHASE 1, LEVEL 2, PER MIN: Performed by: NEUROLOGICAL SURGERY

## 2022-10-04 PROCEDURE — 258N000003 HC RX IP 258 OP 636: Performed by: ANESTHESIOLOGY

## 2022-10-04 PROCEDURE — 250N000009 HC RX 250: Performed by: NEUROLOGICAL SURGERY

## 2022-10-04 PROCEDURE — 278N000051 HC OR IMPLANT GENERAL: Performed by: NEUROLOGICAL SURGERY

## 2022-10-04 PROCEDURE — 250N000011 HC RX IP 250 OP 636

## 2022-10-04 PROCEDURE — 250N000013 HC RX MED GY IP 250 OP 250 PS 637: Performed by: ANESTHESIOLOGY

## 2022-10-04 PROCEDURE — C1713 ANCHOR/SCREW BN/BN,TIS/BN: HCPCS | Performed by: NEUROLOGICAL SURGERY

## 2022-10-04 PROCEDURE — 250N000025 HC SEVOFLURANE, PER MIN: Performed by: NEUROLOGICAL SURGERY

## 2022-10-04 PROCEDURE — 360N000076 HC SURGERY LEVEL 3, PER MIN: Performed by: NEUROLOGICAL SURGERY

## 2022-10-04 PROCEDURE — 710N000012 HC RECOVERY PHASE 2, PER MINUTE: Performed by: NEUROLOGICAL SURGERY

## 2022-10-04 DEVICE — IMP PLATE SYN STR DOG BONE LOW PROFILE 4H TI 421.504: Type: IMPLANTABLE DEVICE | Site: CRANIAL | Status: FUNCTIONAL

## 2022-10-04 DEVICE — KIT LEAD EXTENSION 8 CONTACT STIMULATOR 55CM M365NM3138550: Type: IMPLANTABLE DEVICE | Site: CHEST | Status: FUNCTIONAL

## 2022-10-04 DEVICE — IMPLANTABLE PULSE GENERATOR KIT
Type: IMPLANTABLE DEVICE | Site: CHEST | Status: FUNCTIONAL
Brand: VERCISE GENUS™

## 2022-10-04 RX ORDER — FENTANYL CITRATE 50 UG/ML
25 INJECTION, SOLUTION INTRAMUSCULAR; INTRAVENOUS
Status: DISCONTINUED | OUTPATIENT
Start: 2022-10-04 | End: 2022-10-04 | Stop reason: HOSPADM

## 2022-10-04 RX ORDER — SODIUM CHLORIDE, SODIUM LACTATE, POTASSIUM CHLORIDE, CALCIUM CHLORIDE 600; 310; 30; 20 MG/100ML; MG/100ML; MG/100ML; MG/100ML
INJECTION, SOLUTION INTRAVENOUS CONTINUOUS
Status: DISCONTINUED | OUTPATIENT
Start: 2022-10-04 | End: 2022-10-04 | Stop reason: HOSPADM

## 2022-10-04 RX ORDER — PROPOFOL 10 MG/ML
INJECTION, EMULSION INTRAVENOUS PRN
Status: DISCONTINUED | OUTPATIENT
Start: 2022-10-04 | End: 2022-10-04

## 2022-10-04 RX ORDER — ONDANSETRON 2 MG/ML
4 INJECTION INTRAMUSCULAR; INTRAVENOUS EVERY 30 MIN PRN
Status: DISCONTINUED | OUTPATIENT
Start: 2022-10-04 | End: 2022-10-04 | Stop reason: HOSPADM

## 2022-10-04 RX ORDER — CEPHALEXIN 500 MG/1
500 CAPSULE ORAL 2 TIMES DAILY
Qty: 28 CAPSULE | Refills: 0 | Status: SHIPPED | OUTPATIENT
Start: 2022-10-04 | End: 2022-10-18

## 2022-10-04 RX ORDER — OXYCODONE HYDROCHLORIDE 5 MG/1
5 TABLET ORAL EVERY 4 HOURS PRN
Status: DISCONTINUED | OUTPATIENT
Start: 2022-10-04 | End: 2022-10-04 | Stop reason: HOSPADM

## 2022-10-04 RX ORDER — ONDANSETRON 2 MG/ML
INJECTION INTRAMUSCULAR; INTRAVENOUS PRN
Status: DISCONTINUED | OUTPATIENT
Start: 2022-10-04 | End: 2022-10-04

## 2022-10-04 RX ORDER — SODIUM CHLORIDE, SODIUM LACTATE, POTASSIUM CHLORIDE, CALCIUM CHLORIDE 600; 310; 30; 20 MG/100ML; MG/100ML; MG/100ML; MG/100ML
INJECTION, SOLUTION INTRAVENOUS CONTINUOUS PRN
Status: DISCONTINUED | OUTPATIENT
Start: 2022-10-04 | End: 2022-10-04

## 2022-10-04 RX ORDER — CEFAZOLIN SODIUM 1 G/3ML
INJECTION, POWDER, FOR SOLUTION INTRAMUSCULAR; INTRAVENOUS PRN
Status: DISCONTINUED | OUTPATIENT
Start: 2022-10-04 | End: 2022-10-04

## 2022-10-04 RX ORDER — CALCIUM CHLORIDE 100 MG/ML
INJECTION INTRAVENOUS; INTRAVENTRICULAR PRN
Status: DISCONTINUED | OUTPATIENT
Start: 2022-10-04 | End: 2022-10-04

## 2022-10-04 RX ORDER — LIDOCAINE 40 MG/G
CREAM TOPICAL
Status: DISCONTINUED | OUTPATIENT
Start: 2022-10-04 | End: 2022-10-04 | Stop reason: HOSPADM

## 2022-10-04 RX ORDER — HYDROMORPHONE HCL IN WATER/PF 6 MG/30 ML
0.2 PATIENT CONTROLLED ANALGESIA SYRINGE INTRAVENOUS EVERY 5 MIN PRN
Status: DISCONTINUED | OUTPATIENT
Start: 2022-10-04 | End: 2022-10-04 | Stop reason: HOSPADM

## 2022-10-04 RX ORDER — FENTANYL CITRATE 50 UG/ML
25 INJECTION, SOLUTION INTRAMUSCULAR; INTRAVENOUS EVERY 5 MIN PRN
Status: DISCONTINUED | OUTPATIENT
Start: 2022-10-04 | End: 2022-10-04 | Stop reason: HOSPADM

## 2022-10-04 RX ORDER — OXYCODONE HYDROCHLORIDE 5 MG/1
5 TABLET ORAL EVERY 6 HOURS PRN
Qty: 12 TABLET | Refills: 0 | Status: SHIPPED | OUTPATIENT
Start: 2022-10-04 | End: 2022-10-07

## 2022-10-04 RX ORDER — LIDOCAINE HYDROCHLORIDE 20 MG/ML
INJECTION, SOLUTION INFILTRATION; PERINEURAL PRN
Status: DISCONTINUED | OUTPATIENT
Start: 2022-10-04 | End: 2022-10-04

## 2022-10-04 RX ORDER — DEXAMETHASONE SODIUM PHOSPHATE 4 MG/ML
INJECTION, SOLUTION INTRA-ARTICULAR; INTRALESIONAL; INTRAMUSCULAR; INTRAVENOUS; SOFT TISSUE PRN
Status: DISCONTINUED | OUTPATIENT
Start: 2022-10-04 | End: 2022-10-04

## 2022-10-04 RX ORDER — EPHEDRINE SULFATE 50 MG/ML
INJECTION, SOLUTION INTRAMUSCULAR; INTRAVENOUS; SUBCUTANEOUS PRN
Status: DISCONTINUED | OUTPATIENT
Start: 2022-10-04 | End: 2022-10-04

## 2022-10-04 RX ORDER — ONDANSETRON 4 MG/1
4 TABLET, ORALLY DISINTEGRATING ORAL EVERY 30 MIN PRN
Status: DISCONTINUED | OUTPATIENT
Start: 2022-10-04 | End: 2022-10-04 | Stop reason: HOSPADM

## 2022-10-04 RX ORDER — FENTANYL CITRATE 50 UG/ML
INJECTION, SOLUTION INTRAMUSCULAR; INTRAVENOUS PRN
Status: DISCONTINUED | OUTPATIENT
Start: 2022-10-04 | End: 2022-10-04

## 2022-10-04 RX ADMIN — Medication 50 MG: at 13:34

## 2022-10-04 RX ADMIN — ONDANSETRON 4 MG: 2 INJECTION INTRAMUSCULAR; INTRAVENOUS at 14:59

## 2022-10-04 RX ADMIN — PROPOFOL 30 MCG/KG/MIN: 10 INJECTION, EMULSION INTRAVENOUS at 13:50

## 2022-10-04 RX ADMIN — PROPOFOL 180 MG: 10 INJECTION, EMULSION INTRAVENOUS at 13:34

## 2022-10-04 RX ADMIN — PHENYLEPHRINE HYDROCHLORIDE 200 MCG: 10 INJECTION INTRAVENOUS at 13:58

## 2022-10-04 RX ADMIN — PHENYLEPHRINE HYDROCHLORIDE 0.5 MCG/KG/MIN: 10 INJECTION INTRAVENOUS at 14:53

## 2022-10-04 RX ADMIN — Medication 20 MG: at 14:29

## 2022-10-04 RX ADMIN — FENTANYL CITRATE 25 MCG: 50 INJECTION, SOLUTION INTRAMUSCULAR; INTRAVENOUS at 16:24

## 2022-10-04 RX ADMIN — OXYCODONE HYDROCHLORIDE 5 MG: 5 TABLET ORAL at 16:33

## 2022-10-04 RX ADMIN — SODIUM CHLORIDE, POTASSIUM CHLORIDE, SODIUM LACTATE AND CALCIUM CHLORIDE: 600; 310; 30; 20 INJECTION, SOLUTION INTRAVENOUS at 16:07

## 2022-10-04 RX ADMIN — SUGAMMADEX 200 MG: 100 INJECTION, SOLUTION INTRAVENOUS at 15:24

## 2022-10-04 RX ADMIN — Medication 5 MG: at 14:07

## 2022-10-04 RX ADMIN — SODIUM CHLORIDE, POTASSIUM CHLORIDE, SODIUM LACTATE AND CALCIUM CHLORIDE: 600; 310; 30; 20 INJECTION, SOLUTION INTRAVENOUS at 13:47

## 2022-10-04 RX ADMIN — SODIUM CHLORIDE, POTASSIUM CHLORIDE, SODIUM LACTATE AND CALCIUM CHLORIDE: 600; 310; 30; 20 INJECTION, SOLUTION INTRAVENOUS at 13:32

## 2022-10-04 RX ADMIN — PHENYLEPHRINE HYDROCHLORIDE 100 MCG: 10 INJECTION INTRAVENOUS at 14:42

## 2022-10-04 RX ADMIN — PHENYLEPHRINE HYDROCHLORIDE 100 MCG: 10 INJECTION INTRAVENOUS at 13:34

## 2022-10-04 RX ADMIN — Medication 10 MG: at 14:38

## 2022-10-04 RX ADMIN — SODIUM CHLORIDE, POTASSIUM CHLORIDE, SODIUM LACTATE AND CALCIUM CHLORIDE: 600; 310; 30; 20 INJECTION, SOLUTION INTRAVENOUS at 14:42

## 2022-10-04 RX ADMIN — CALCIUM CHLORIDE 500 MG: 100 INJECTION INTRAVENOUS; INTRAVENTRICULAR at 14:49

## 2022-10-04 RX ADMIN — PHENYLEPHRINE HYDROCHLORIDE 200 MCG: 10 INJECTION INTRAVENOUS at 13:55

## 2022-10-04 RX ADMIN — LIDOCAINE HYDROCHLORIDE 100 MG: 20 INJECTION, SOLUTION INFILTRATION; PERINEURAL at 13:34

## 2022-10-04 RX ADMIN — FENTANYL CITRATE 50 MCG: 50 INJECTION, SOLUTION INTRAMUSCULAR; INTRAVENOUS at 15:28

## 2022-10-04 RX ADMIN — CEFAZOLIN 2 G: 1 INJECTION, POWDER, FOR SOLUTION INTRAMUSCULAR; INTRAVENOUS at 13:46

## 2022-10-04 RX ADMIN — PHENYLEPHRINE HYDROCHLORIDE 150 MCG: 10 INJECTION INTRAVENOUS at 14:46

## 2022-10-04 RX ADMIN — CALCIUM CHLORIDE 500 MG: 100 INJECTION INTRAVENOUS; INTRAVENTRICULAR at 15:01

## 2022-10-04 RX ADMIN — PHENYLEPHRINE HYDROCHLORIDE 100 MCG: 10 INJECTION INTRAVENOUS at 14:38

## 2022-10-04 RX ADMIN — PROPOFOL 50 MG: 10 INJECTION, EMULSION INTRAVENOUS at 14:25

## 2022-10-04 RX ADMIN — Medication 5 MG: at 13:58

## 2022-10-04 RX ADMIN — PHENYLEPHRINE HYDROCHLORIDE 100 MCG: 10 INJECTION INTRAVENOUS at 14:24

## 2022-10-04 RX ADMIN — PHENYLEPHRINE HYDROCHLORIDE 100 MCG: 10 INJECTION INTRAVENOUS at 14:21

## 2022-10-04 RX ADMIN — Medication 5 MG: at 14:34

## 2022-10-04 RX ADMIN — FENTANYL CITRATE 100 MCG: 50 INJECTION, SOLUTION INTRAMUSCULAR; INTRAVENOUS at 13:34

## 2022-10-04 RX ADMIN — FENTANYL CITRATE 25 MCG: 50 INJECTION, SOLUTION INTRAMUSCULAR; INTRAVENOUS at 16:03

## 2022-10-04 RX ADMIN — DEXAMETHASONE SODIUM PHOSPHATE 8 MG: 4 INJECTION, SOLUTION INTRA-ARTICULAR; INTRALESIONAL; INTRAMUSCULAR; INTRAVENOUS; SOFT TISSUE at 13:34

## 2022-10-04 RX ADMIN — FENTANYL CITRATE 25 MCG: 50 INJECTION, SOLUTION INTRAMUSCULAR; INTRAVENOUS at 16:09

## 2022-10-04 RX ADMIN — PHENYLEPHRINE HYDROCHLORIDE 100 MCG: 10 INJECTION INTRAVENOUS at 14:34

## 2022-10-04 RX ADMIN — FENTANYL CITRATE 25 MCG: 50 INJECTION, SOLUTION INTRAMUSCULAR; INTRAVENOUS at 16:19

## 2022-10-04 RX ADMIN — FENTANYL CITRATE 50 MCG: 50 INJECTION, SOLUTION INTRAMUSCULAR; INTRAVENOUS at 14:25

## 2022-10-04 RX ADMIN — PROPOFOL 30 MG: 10 INJECTION, EMULSION INTRAVENOUS at 15:31

## 2022-10-04 ASSESSMENT — ACTIVITIES OF DAILY LIVING (ADL)
ADLS_ACUITY_SCORE: 35

## 2022-10-04 NOTE — DISCHARGE INSTRUCTIONS
[facility], [place] // Same-Day Surgery // Adult Discharge Orders & Instructions     Take it easy when you get home.  Remember, same day surgery DOES NOT MEAN SAME DAY RECOVERY! Healing is a gradual process.   You will need some time to recover- you may be more tired than you realize at first.  Rest and relax for at least the first 24 hours at home.  You'll feel better and heal faster if you take good care of yourself.     ANESTHESIA RECOVERY -   For 24 hours after surgery    Get plenty of rest.  A responsible adult must stay with you for at least 24 hours after you leave the hospital.   Do not drive or use heavy equipment.  If you have weakness or tingling, don't drive or use heavy equipment until this feeling goes away.  Do not drink alcohol.  Avoid strenuous or risky activities.  Ask for help when climbing stairs.   You may feel lightheaded.  IF so, sit for a few minutes before standing.  Have someone help you get up.   If you have nausea (feel sick to your stomach): Drink only clear liquids such as apple juice, ginger ale, broth or 7-Up.  Rest may also help.  Be sure to drink enough fluids.  Move to a regular diet as you feel able.  You may have a slight fever. Call the doctor if your fever is over 100 F (37.7 C) (taken under the tongue) or lasts longer than 24 hours.  You may have a dry mouth, a sore throat, muscle aches or trouble sleeping.  These should go away after 24 hours.  Do not make important or legal decisions.     Call your doctor for any of the followin.  Signs of infection (fever, growing tenderness at the surgery site, a large amount of drainage or bleeding, severe pain, foul-smelling drainage, redness, swelling).  2. It has been over 8 to 10 hours since surgery and you are still not able to urinate (pass water).  3.  Headache for over 24 hours.    To contact a doctor, call:  Dr. Otero's Neurosurgery clinic at [phone] and ask for the  resident on call for Neurosurgery (answered 24 hours a day)  Emergency Department: [facility]: [phone] (TTY for hearing impaired: [phone])

## 2022-10-04 NOTE — ANESTHESIA PROCEDURE NOTES
Airway       Patient location during procedure: OR       Procedure Start/Stop Times: 10/4/2022 1:42 PM  Staff -        CRNA: Whitley Price APRN CRNA       Performed By: other anesthesia staff  Consent for Airway        Urgency: elective  Indications and Patient Condition       Indications for airway management: vickey-procedural       Induction type:intravenous       Mask difficulty assessment: 2 - vent by mask + OA or adjuvant +/- NMBA    Final Airway Details       Final airway type: endotracheal airway       Successful airway: ETT - single and Oral  Endotracheal Airway Details        ETT size (mm): 7.5       Cuffed: yes       Successful intubation technique: video laryngoscopy       VL Blade Size: MAC 4       Grade View of Cords: 1       Adjucts: stylet       Position: Right       Measured from: lips       Secured at (cm): 23       Bite block used: None    Post intubation assessment        Placement verified by: capnometry, equal breath sounds and chest rise        Number of attempts at approach: 1       Secured with: pink tape       Ease of procedure: easy       Dentition: Intact and Unchanged    Medication(s) Administered   Medication Administration Time: 10/4/2022 1:42 PM    Additional Comments       Intubation performed by medical student.

## 2022-10-04 NOTE — ANESTHESIA POSTPROCEDURE EVALUATION
Patient: Arley Butt    Procedure: Procedure(s):  Deep brain stimulator placement, phase II, placement of deep brain stimulator generator/battery over the left chest wall       Anesthesia Type:  General    Note:  Disposition: Outpatient   Postop Pain Control: Uneventful            Sign Out: Well controlled pain   PONV: No   Neuro/Psych: Uneventful            Sign Out: Acceptable/Baseline neuro status   Airway/Respiratory: Uneventful            Sign Out: Acceptable/Baseline resp. status   CV/Hemodynamics: Uneventful            Sign Out: Acceptable CV status; No obvious hypovolemia; No obvious fluid overload   Other NRE: NONE   DID A NON-ROUTINE EVENT OCCUR? No           Last vitals:  Vitals Value Taken Time   /122 10/04/22 1546   Temp     Pulse 86 10/04/22 1547   Resp     SpO2 96 % 10/04/22 1547   Vitals shown include unvalidated device data.    Electronically Signed By: Lopez Jama MD  October 4, 2022  3:49 PM

## 2022-10-04 NOTE — ANESTHESIA CARE TRANSFER NOTE
Patient: Arley Butt    Procedure: Procedure(s):  Deep brain stimulator placement, phase II, placement of deep brain stimulator generator/battery over the left chest wall       Diagnosis: Essential tremor [G25.0]  Diagnosis Additional Information: No value filed.    Anesthesia Type:   General     Note:    Oropharynx: oropharynx clear of all foreign objects  Level of Consciousness: awake  Oxygen Supplementation: nasal cannula    Independent Airway: airway patency satisfactory and stable  Dentition: dentition unchanged  Vital Signs Stable: post-procedure vital signs reviewed and stable  Report to RN Given: handoff report given  Patient transferred to: PACU    Handoff Report: Identifed the Patient, Identified the Reponsible Provider, Reviewed the pertinent medical history, Discussed the surgical course, Reviewed Intra-OP anesthesia mangement and issues during anesthesia, Set expectations for post-procedure period and Allowed opportunity for questions and acknowledgement of understanding      Vitals:  Vitals Value Taken Time   /122 10/04/22 1546   Temp     Pulse 89 10/04/22 1549   Resp     SpO2 96 % 10/04/22 1549   Vitals shown include unvalidated device data.    Electronically Signed By: Lopez Jama MD  October 4, 2022  3:49 PM

## 2022-10-04 NOTE — ADDENDUM NOTE
Addendum  created 10/04/22 1555 by Whitley Price APRN CRNA    Flowsheet accepted, Intraprocedure Meds edited

## 2022-10-04 NOTE — BRIEF OP NOTE
[facility]    Brief Operative Note    Pre-operative diagnosis: Essential tremor [G25.0]  Post-operative diagnosis Same as pre-operative diagnosis    Procedure: Procedure(s):  Deep brain stimulator placement, phase II, placement of deep brain stimulator generator/battery over the left chest wall  Surgeon: Surgeon(s) and Role:     * Manuel Otero MD - Primary  Anesthesia: General   Estimated Blood Loss: Minimal    Drains: None  Specimens: * No specimens in log *  Findings:   Normal impedences..  Complications: None.  Implants:   Implant Name Type Inv. Item Serial No.  Lot No. LRB No. Used Action   IMP KIT R16 VERCISE PULSE GENERATOR DB-1216 - [id number] Neurology device IMP KIT R16 VERCISE PULSE GENERATOR DB-1216 [id number] [employer] [id number] Left 1 Implanted   KIT LEAD EXTENSION 8 CONTACT STIMULATOR 55CM [id number] - [id number] Leads KIT LEAD EXTENSION 8 CONTACT STIMULATOR 55CM [id number] [employer]  Left 1 Implanted   KIT LEAD EXTENSION 8 CONTACT STIMULATOR 55CM [id number] - [id number] Leads KIT LEAD EXTENSION 8 CONTACT STIMULATOR 55CM [id number] [employer]  Left 1 Implanted   IMP SCR SYN MATRIX LOW PRO 1.5X04MM SELF DRILL 04.503.104.01 - [id number] Metallic Hardware/[place] IMP SCR SYN MATRIX LOW PRO 1.5X04MM SELF DRILL 04.503.104.01  SYNTHES-STRATEC NA Left 1 Wasted   IMP SCR SYN MATRIX LOW PRO 1.5X04MM SELF DRILL 04.503.104.01 - [id number] Metallic Hardware/[place] IMP SCR SYN MATRIX LOW PRO 1.5X04MM SELF DRILL 04.503.104.01  SYNTHES-STRATEC NA Left 4 Implanted   IMP PLATE SYN STR DOG BONE LOW PROFILE 2H .502 - [id number] Metallic Hardware/[place] IMP PLATE SYN STR DOG BONE LOW PROFILE 2H .502  SYNTHES-STRATEC NA Left 1 Wasted   IMP PLATE SYN STR DOG BONE LOW PROFILE 4H .504 - [id number] Metallic Hardware/[place] IMP PLATE SYN STR DOG BONE LOW PROFILE 4H .504  SYNTHES-STRATEC NA  Left 2 Implanted         Shakila Pak MD, PhD  PGY-1 Neurosurgery

## 2022-10-05 NOTE — OP NOTE
DATE OF PROCEDURE:  10/04/2022     PREOPERATIVE DIAGNOSIS:          Essential Tremor     POSTOPERATIVE DIAGNOSIS:          Essential Tremor     PROCEDURES PERFORMED:  1.  Deep brain stimulator placement, phase II, placement of deep brain stimulator generator/battery over the Left chest wall  2.  Placement of two extension wires and connection of the left side deep brain stimulator electrode, one electrode array, to the new generator/battery  3.  Electrical interrogation and analysis of the deep brain stimulator system.     ATTENDING SURGEON:  Manuel Otero MD     RESIDENT SURGEON:  None available     ANESTHESIA:  General endotracheal anesthesia     ESTIMATED BLOOD LOSS: 10 cc     COMPLICATIONS:  None     FINDINGS:  Impedance values within normal limits for left sided intracranial lead and extension wire.  Impedance values within normal limits for right sided extension wire when tested     IMPLANTS:     Implant Name Type Inv. Item Serial No.  Lot No. LRB No. Used Action   IMP KIT R16 VERCISE PULSE GENERATOR DB-1216 - [id number] Neurology device IMP KIT R16 VERCISE PULSE GENERATOR DB-1216 [id number] [employer] [id number] Left 1 Implanted   KIT LEAD EXTENSION 8 CONTACT STIMULATOR 55CM [id number] - [id number] Leads KIT LEAD EXTENSION 8 CONTACT STIMULATOR 55CM [id number] [employer]   Left 1 Implanted   KIT LEAD EXTENSION 8 CONTACT STIMULATOR 55CM [id number] - [id number] Leads KIT LEAD EXTENSION 8 CONTACT STIMULATOR 55CM [id number] [employer]   Left 1 Implanted   IMP SCR SYN MATRIX LOW PRO 1.5X04MM SELF DRILL 04.503.104.01 - [id number] Metallic Hardware/[place] IMP SCR SYN MATRIX LOW PRO 1.5X04MM SELF DRILL 04.503.104.01   SYNTHES-STRATEC NA Left 1 Wasted   IMP SCR SYN MATRIX LOW PRO 1.5X04MM SELF DRILL 04.503.104.01 - [id number] Metallic Hardware/[place] IMP SCR SYN MATRIX LOW PRO 1.5X04MM SELF DRILL 04.503.104.01   SYNTHES-STRATEC NA Left 4 Implanted   IMP PLATE  SYN STR DOG BONE LOW PROFILE 2H .502 - [id number] Metallic Hardware/[place] IMP PLATE SYN STR DOG BONE LOW PROFILE 2H .502   SYNTHES-STRATEC NA Left 1 Wasted   IMP PLATE SYN STR DOG BONE LOW PROFILE 4H .504 - [id number] Metallic Hardware/[place] IMP PLATE SYN STR DOG BONE LOW PROFILE 4H .504   SYNTHES-[employer]ATEC NA Left 2 Implanted      INDICATIONS FOR PROCEDURE:    72 y/o right handed gentleman with ET referred for neurosurgical consideration for DBS from the [facility]. See my prior note for details. In brief, he has had bilateral hand tremors, R>L for a few years, but it has significantly worsened over the last year or so. Has tried primidone and propranolol without benefit. It is interfering significantly with his daily living activities including eating, drinking, writing. Not on antiplatelets or anticoagulation. Patient was offered DBS stimulation electrode implantation procedure for his symptoms. All the risks and benefits were discussed with the patient in detail. His Phase I DBS was performed last week without complication and he consented to undergo the above-mentioned procedure today.       DESCRIPTION OF PROCEDURE:  The patient was taken to the operating theater and positioned supine on the operating room table.  All pressure points were appropriately padded.  With placement of the endotracheal tube, general endotracheal anesthesia was induced.  His head was turned to the right side, thus exposing the left parietal area of the head.  The distal end of the previously implanted stimulating electrode could be palpated on the left side of the head.  A small area of hair was removed over this palpable connector using a surgical hair clipper.  Then the left side of the head, neck and chest area were cleaned, prepared, and draped in sterile fashion.  Local anesthetic was injected in the areas of intended incision at the left scalp and left chest wall as well as along the path of the  intended tunneling.  A total of 12 mL of 1% lidocaine with epinephrine was used.  A timeout was performed confirming the patient's identity, procedure to be performed, site and side of surgery and administration of preoperative prophylactic antibiotics.       Attention was turned to the head.  A #10 blade scalpel was used to make a 4 cm C-shaped skin incision beginning at the distal end of the connector that was palpated in his scalp and extending inferiorly.  Hemostasis was achieved with bipolar cautery.  The incision was carried down to the pericranium with the knife. A linear incision down to the bone was then created with monopolar cautery and an area created for drilling.  A 5 mm round cutting jazlyn on a high speed drill was used to create a single wide trough in the down to the inner table of bone to accommodate the extension wire connector. Next, the distal end of the intracranial lead was exposed using a combination of sharp and blunt dissection. A mosquito was used to hold the protective cover, and I gently pulled out the connector.  This was found to be fully intact.  At that point, a pocket was made using a Sophia clamp down toward behind the ear into the nuchal line.  This was in preparation for tunneling of the extension wires.  Another pocket was also created superiorly and posteriorly for the placement of the second extension wire head.     Attention was turned to the left chest wall area.  A #10 blade scalpel was used to make a 5 cm incision on the left chest wall.  The #10 blade was used to finish dissection down to the proper plane. I found a nice dissecting plane superficial to the pectoralis muscle.  A combination of blunt and sharp dissection technique was used to establish a subcutaneous pocket about 3 fingerbreadths wide and deep enough to encompass the generator/battery sizer.  Hemostasis was achieved with bipolar cautery.  The pocket was copiously irrigated and one sponge was placed in the  pocket.      At this point, a tunneler was brought into the field. I tunneled a passage from the head incision to the chest wall incision.  Care was taken to stay superficial and the path of the tunneler was confirmed to be over the clavicle.  The tunneler was removed, leaving behind a plastic sheath.  Two extension wires were passed from the head incision to the chest incision.  Subsequently, the plastic sheath was pulled out from the chest incision, leaving behind the tunneled extension wires.     The protective covering was removed from the connector portion of the left sided intracranial electrode.  The contacts were inspected to be clean and without damage.  We then proceeded to make the connection between the left intracranial lead and the predesignated extension wire intended for the future right intracranial lead .  At this time, a new Genus R16 generator/battery was brought onto the field.  The right extension wire was connected to the generator/battery at the posterior portal and secured with a screw .  The left extension wire was connected to the generator/battery at the anterior portal and secured with a screw .  The sponge was taken out of the chest pocket.  The generator/battery was placed into the pocket for testing.  Impedance values were found to be within normal limits for the right side/anterior portal.  The left sided intracranial lead was disconnected from the extension wire intended for the future right intracranial lead.  The right extension wire remained connected to the generator/battery at the posterior portal.  The connector portion for the future right side intracranial lead was plugged with a dead end plug and secured with a screw .  Then, the connector was placed within the pocket in the posterior right parietal scalp pocket that was just created.  The extension wire was pulled from the chest incision until the connector was within the head incision and there were no  excess wires. The left extension wire was then connected to the left intracranial lead and again, impedances were checked and the values were found to be within normal limits. The left side extension wire connector was then buried within the drilled out trough and plated with two separate four hole dog bone titanium plates with 4 mm screws. This was secured so that the connector fit snugly within the trough but was not being crushed by the plates.      Therefore, the left side intracranial electrode, one electrode array was connected was connected to the extension wire and to the generator/battery.  The future right side extension wire was tested to be without any problems and is connected to the generator/battery.       The generator/battery was again taken out of the pocket.  Hemostasis of the pocket was performed with bipolar cautery.  The area was dry.  Then, the new generator/battery with the excess wires being placed behind and at the periphery of the generator/battery were placed into the left chest wall pocket.  The entire apparatus fit into the pocket comfortably.       The system was tested wirelessly again.  All the impedance values of the left intracranial lead and extension wire was within normal limits.  No problems were found with the system.     With the satisfactory placement of the system, we began closing the wound. The left chest incision was closed in the following manner.  The deep pocket was reapproximated using 3-0 Vicryl sutures in an inverted interrupted fashion.  The subcutaneous fat layer was reapproximated using 3-0 Vicryl sutures in an inverted interrupted fashion.  The dermal layer was reapproximated using 3-0 Vicryl sutures in an inverted interrupted fashion.  The skin was reapproximated using 4-0 Monocryl suture in a subcuticular fashion. The left parietal head incision was irrigated and dried.  Meticulous hemostasis was obtained.  It was then closed in the following manner. The  fascial layer below the galea was reapproximated over the implanted hardware with 3-0 Vicryl sutures in interrupted fashion. The galea was then reapproximated over the implanted hardware using 3-0 Vicryl sutures in an inverted interrupted fashion. This provided a protective layer. The skin was then reapproximated using 4-0 Monocryl suture in a running fashion.  Both wounds were cleaned and dried. ChoraPrep was used to clean the incisions again.  Scalp incision was then covered with sterile primapore dressing and the left chest wall incision was further secured with steristrips and then with a sterile dressing.     At the end of the case, all counts including needle, sponge and instrument counts were correct x 2.  There were no complications.  Patient was extubated and taken to the recovery room in a stable condition.

## 2022-10-06 ENCOUNTER — PATIENT OUTREACH (OUTPATIENT)
Dept: NEUROSURGERY | Facility: CLINIC | Age: 72
End: 2022-10-06

## 2022-10-06 DIAGNOSIS — G25.0 ESSENTIAL TREMOR: ICD-10-CM

## 2022-10-06 RX ORDER — OXYCODONE HYDROCHLORIDE 5 MG/1
5 TABLET ORAL EVERY 6 HOURS PRN
Qty: 12 TABLET | Refills: 0 | Status: SHIPPED | OUTPATIENT
Start: 2022-10-06 | End: 2023-04-03

## 2022-10-06 NOTE — PROGRESS NOTES
[facility]reotax[facility] Neurosurgery Neurophysiology Summary    Name: Arley Butt  : 1950   MRN: [id number]   Surgery Date: 22  Surgery Performed:  1. Intraoperative microelectrode recording for guidance of deep brain stimulator lead placement. 2. Test microstimulation from the microelectrode. 3. Test macrostimulation from the microelectrode canula and from the implanted DBS lead.  Neurologist: Niru Levy, PhD, Kota Christopher, PhD, Xena Tariq DO  Neurosurgeon: Manuel Otero MD    Diagnosis: Essential Tremor    Target:  ventral intermediate (Vim) nucleus of the thalamus    Side: left     Procedure:   The surgical field was prepared as per the neurosurgeon's note. Microelectrodes were attached to the stereotactic frame and lowered through the brain with a calibrated microdrive. Two simultaneous microelectrode penetrations were made using the Romain-Gun device. Microelectrode recording was performed using the center (aimed 2 mm posterior to the anatomic target as determined by Stealth system planning) and 2 mm posterior positions of the Romain-Gun system. The center and posterior Romain-Gun system positions were mapped in order to increase the likelihood of identifying the anterior border of the sensory (Vc) nucleus, which corresponds to the posterior border of the Vim nucleus.    Functional Physiologic Mappin Hours, 30 Minutes     Mapping Equipment: [employer]    Microelectrode Mapping    Electrophysiological Mapping: The target was physiologically mapped using 1 pass    Pass 1:    Mapping:   Center track: VC was not observed throughout this track. Proprioceptive SSR was observed in the shoulder.     Posterior track: Robust activity was observed with SSR in the elbow, shoulder, hip, knee and foot (proprioceptive), forearm/elbow (deep touch) and wrist/hand (tactile); the VC was positively identified.     Microstimulation:  There were not side effects encountered with microstimulation in  "the center track. In the posterior track, microstimulation resulted in paresthesias in the hand at 30 mA between depths -3 mm to -1 mm, 70 mA at 0 mm; no paraesthesias at 1 mm.     Ring electrode stimulation: We then tested ring stimulation in the center Romain-Gun and found capsule in the tongue at 1.8mA (at 4mm above). With center ring stimulation at several frequencies and depths (namely 2 mA at 4 mm, 1.2 mA 1 mm, 0.8 mA at 0 mm), the patient also reported a sensation in his leg that was hard to describe, but was consistently related to stimulation which was a transient effect, thus believed to be most likely paresthesia, not capsular.     Based on above observations and no orolingual somatosensory responses, we suspected a lateral location and desired to move more medially. Thus, we performed a 30 degree clockwise rotation.    Lead placement:  The lead was placed in the anterior bengun (following the 30 degree clockwise rotation) at target depth, 0 mm.    Macrostimulation (DBS lead):    Lead:   Test macrostimulation from the BELTRAN canula in center track (following a 30 degree clockwise rotation) was performed at target. The depth was based on our ventral border of the center track being at 0 mm.     Stimulation Parameters: Test macrostimulation from the lead was then performed with 1 negative, 567 positive, 60 microseconds, 130 Hz, and he developed a \"heavy feeling\" in his leg starting at 1.5 mA.  This sensation was observed to be transient and believed to be likely a paresthesia, not a capsular effect.  At 2 mA the patient's tremor was slightly worse than baseline.  At 234 negative, 8 positive, 60 microseconds, 130 Hz, starting at 2 mA, the patient described a similar transient sensation in the leg.  At 3.5 mA, patient experienced slowness with opening/closing of fist with reported tightness in the hand and this was thought to be a capsular effect.  The sensation of hand tightness resolved at 3 mA.  Again at 1 " "negative, 567 positive, 60 microseconds, 130 Hz, 2 mA, hand tightness was observed with opening/closing fist.      The above presumed capsular thresholds (believed to be low), the possibly worsening tremor, and the observed paresthesias in the lower extremity indicated a more lateral position. Thus, it was decided to undergo a XY stage shift 1 mm medially while maintaining the 30 degree rotation. The lead was then placed in the new anterior track of Romain-Gun position (now 2 mm medial and 1.7 mm anterior from the center Romain-Gun) with the tip at target depth, 0 mm. Test stimulation at 1 negative, 567 positive resulted in the same transient sensation of the leg at 2 mA, as well as an \"uncomfortable\" transient sensation in the arm at 2.5 mA; as the sensations were observed to be transient, it was determined to be most likely paresthesia, not capsular effect.  Once the \"uncomfortable\" sensation in the arm resolved, there was no difficulty with opening/closing the hand.  At 567 negative, 1 positive, the same transient paresthesias in the leg were observed at 2 mA with no sensations in the arm up to 4 mA (max tested).  Unfortunately, the patient's tremor was minimal prior to and during the procedure which made it difficult to evaluate for improvement in tremor with stimulation.    Outcome: Based on these microstimulation and macrostimulation findings from the microelectrode, the anterior position of the Romain-Gun system (after a 30 degree clockwise rotation and 1 mm medial XY shift, 2 mm medial, 1.7 mm anterior relative to the initial center target) is thought to be a good location for DBS lead placement. Thus, the lead was then secured into place with the tip at target depth, 0 mm.     Lesion effect: A lesion effect was not noticed after lead placement as tremors were minimal prior to and throughout the procedure so a clear improvement in tremor was difficult to observe.    The patient tolerated the procedure without " difficulty and there were no complications.    Xena Tariq DO   of Neurology   [facility]

## 2022-10-06 NOTE — PROGRESS NOTES
[facility]: Post-Discharge Note  SITUATION                                                      Admission:    Admission Date: 10/04/22   Reason for Admission: Deep brain stimulator placement, phase II, placement of deep brain stimulator generator/battery over the left chest wall  Discharge:   Discharge Date: 10/04/22  Discharge Diagnosis: Essential tremor    BACKGROUND                                                      Per hospital discharge summary and inpatient provider notes:  Neurosurgery Discharge Coordination Note     Attending physician: Dr. Otero  Discharge to: Home     Current status: Spoke with pt's daughter. Chest incision pain is 5/10 and connector site incision is 6-7 out of 10 with some relief from 5 mg oxycodone q6 hours supplemented with Tylenol between doses.Denies redness, swelling, increased tenderness, incision opening or elevated temp. There is serosang drainage from the incision behind the left ear, and they are changing the dressing as needed.  Denies current bowel or bladder issues.    Daughter says that pt will be out of oxycodone by 4 pm tomorrow and they would like a refill sent to the [facility]. Will follow-up with Dr. Otero and/or HEBER.     Discharge instructions and medications reviewed with patient's daughter. Reminded her to keep steri strips in place, do not remove. Pt may shower tomorrow.   Follow up appointments/imaging/tests needed: 2 week post op with Dr. Otero on 10/18.    RN triage/on call number given: [phone]/ [phone]        ASSESSMENT           Discharge Assessment  How are your symptoms? (Red Flag symptoms escalate to triage hotline per guidelines): Improved  Do you feel your condition is stable enough to be safe at home until your provider visit?: Yes  Does the patient have their discharge instructions? : Yes  Does the patient have questions regarding their discharge instructions? : No  Were you started on any new medications or were there  changes to any of your previous medications? : Yes  Does the patient have all of their medications?: Yes  Do you have questions regarding any of your medications? : No  Discharge follow-up appointment scheduled within 14 calendar days? : Yes  Discharge Follow Up Appointment Date: 10/18/22  Discharge Follow Up Appointment Scheduled with?: Specialty Care Provider         Post-op (Clinicians Only)  Did the patient have surgery or a procedure: Yes  Incision: closed;healing  Drainage: Yes (at left connector incision site)  Drainage Color: serosanguinous  Incision Drainage Amount: light  Fever: No  Chills: No  Redness: No  Warmth: No  Swelling: No  Incision site pain: Yes  Closure: suture;dissolving  Eating & Drinking: eating and drinking without complaints/concerns  PO Intake: regular diet  Urinary Status: voiding without complaint/concerns        PLAN                                                      Outpatient Plan:  Routine postop follow-up      Future Appointments   Date Time Provider Department Center   10/18/2022 12:00 PM Manuel Otero MD [facility]   10/24/2022 10:00 AM Kaylynn Plummer MD [facility]   10/24/2022  1:20 PM [address]         For any urgent concerns, please contact our 24 hour nurse triage line: [phone] ([phone])         JEREMIE DAWSON RN

## 2022-10-07 ENCOUNTER — TELEPHONE (OUTPATIENT)
Dept: NEUROSURGERY | Facility: CLINIC | Age: 72
End: 2022-10-07

## 2022-10-07 NOTE — TELEPHONE ENCOUNTER
See [employer] message, which covered part of this call.     I let pt's daughter Svitlana know that our NP Juan Regalado sent a prescription for 12 oxycodone to the [facility] yesterday about about 3:30 p.m. She said that she thought her sister picked up a refill with only 5 tabs. Advised her to check the bottle to see who the prescriber is; confirmed that Juan Regalado wrote for 12 tabs.     Svitlana said that her sister reported some vision issues with pt. It was very unclear what the issue is b/c Svitlana said that when pt's head is straight ahead and he looks to the left, his right eye moves towards his nose. She will follow-up with me after talking to her sister.     Nothing further at this time.

## 2022-10-07 NOTE — TELEPHONE ENCOUNTER
"Called pt's daughter back after reviewing the photos and discussing with Dr. Otero, who also reviewed the photos. Dr. Otero said that the \"lazy\" eye issue is not related to surgery and he doesn't have surgical concerns. Pt and family may want to follow-up with his ophthalmologist to discuss. Daughter expressed understanding. Nothing further at this time.   "

## 2022-10-10 NOTE — TELEPHONE ENCOUNTER
Pt's daughter Svitlana called.    1. At the time DENICE Regalado refilled pt's oxycodone prescription last week, pt's [facility] team also filled a prescription for 20 oxycodone tablets. Pt picked up that prescription and will not be picking up the one written by DENICE Regalado.     2. Daughter wanted to know how pt should wean off oxycodone. He can increase the interval between doses from 6 hours to 8-10 hours as needed, for example, with Tylenol between doses as needed. He can continue to increase the interval between doses or eliminate a dose.     3. Daughter said that pt is still taking senna-docusate 2 tabs twice daily. Also taking a half dose of Miralax daily. Yesterday he had 3 non-waterybowel movements. He has not had a bowel movement today. Does he need to keep taking senna-docusate or wait until stool is watery before stopping? This was prescribed postoperatively b/c anesthesia and oxycodone can cause constipation. If pt is weaning off oxycodone and doesn't need 2 tabs BID, he can take 1 tab BID or maybe take 2 tabs at night and see if he has a bowel movement in the morning. Still important to maintain good fluid intake, fiber in diet and moderate walking to help combat constipation.     4. Pt's eyes become blurry if he reads for an extended period. As we discussed, Dr. Otero doesn't think the pt's eye issues are related to surgery. This could be a function of fatigue or may be something that needs to be addressed by his eye doctor.     5. Can pt use essential oils for pain management? Pt/family can try this. Do not apply essential oils to incision or skin, but may use in a diffuser or on a cotton ball that pt can smell as needed.     6. Should pt have gotten a call from the device rep to review how to use the patient ? Bring the charged  to his programming appt with Dr. Plummer. She or the movement disorder RNs will review how to use it.     No further questions at this time.

## 2022-10-10 NOTE — TELEPHONE ENCOUNTER
Pt's daughter sent [employer] message requesting a call back. Got daughter's VM and requested she call at her earliest convenience.

## 2022-10-12 ENCOUNTER — DOCUMENTATION ONLY (OUTPATIENT)
Dept: OTHER | Facility: CLINIC | Age: 72
End: 2022-10-12

## 2022-10-17 ENCOUNTER — DOCUMENTATION ONLY (OUTPATIENT)
Dept: NEUROLOGY | Facility: CLINIC | Age: 72
End: 2022-10-17

## 2022-10-17 ENCOUNTER — MEDICAL CORRESPONDENCE (OUTPATIENT)
Dept: HEALTH INFORMATION MANAGEMENT | Facility: CLINIC | Age: 72
End: 2022-10-17

## 2022-10-17 NOTE — PROGRESS NOTES
Received Community care provider request for service form, signed by provider and emailed to Becka Adams

## 2022-10-18 ENCOUNTER — OFFICE VISIT (OUTPATIENT)
Dept: NEUROSURGERY | Facility: CLINIC | Age: 72
End: 2022-10-18
Payer: COMMERCIAL

## 2022-10-18 VITALS — OXYGEN SATURATION: 94 % | DIASTOLIC BLOOD PRESSURE: 73 MMHG | HEART RATE: 68 BPM | SYSTOLIC BLOOD PRESSURE: 111 MMHG

## 2022-10-18 DIAGNOSIS — G25.2 OTHER SPECIFIED FORMS OF TREMOR: ICD-10-CM

## 2022-10-18 PROCEDURE — 99024 POSTOP FOLLOW-UP VISIT: CPT | Performed by: NEUROLOGICAL SURGERY

## 2022-10-18 ASSESSMENT — PAIN SCALES - GENERAL: PAINLEVEL: NO PAIN (0)

## 2022-10-18 NOTE — LETTER
10/18/2022       RE: Arley Butt  [address] [place] [address]     Dear Colleague,    Thank you for referring your patient, Arley Butt, to the [facility] [place] [facility]. Please see a copy of my visit note below.    Neurosurgery Attending DBS Follow-Up Note    HPI: Arley Butt is a 71 y/o gentleman s/p left ViM DBS (BSci Genus R16 with two extensions) 3 weeks ago who presents for f/u. Doing well so far. Had a lesion effect, but his tremor is essentially back at its baseline now. Scheduled for initial programming next week and is excited for this. Has some double or blurry vision when reading for a while. Had a lot of pain initially postop related to the pin sites, but that has resolved. Wondering if we can use a different local anesthetic mixture for pin site infiltration next time since it seemed mostly ineffective last time. Remains very interested in treating his left body/right brain as well. Will need neuropsych testing follow up at 6 months prior to scheduling.     Exam:  Awake, alert, oriented x 3  Attends, follows, fluent  PERRL  EOMI  FS  TML  BUE/BLE 5/5, no drift  Wounds all c/d/i, no erythema, swelling or tenderness    Programming schedule: next week with Dr. Plummer    UPDRS/TETRAS outcome: TBD    A/P: 71 y/o gentleman with ET s/p left ViM DBS, doing well.    - RTC in 3 weeks  - Programming per movement disorders team  - Slowly increase physical activity      Sincerely,    Manuel Otero MD

## 2022-10-22 NOTE — PROGRESS NOTES
Neurosurgery Attending DBS Follow-Up Note    HPI: Arley Butt is a 71 y/o gentleman s/p left ViM DBS (BSci Genus R16 with two extensions) 3 weeks ago who presents for f/u. Doing well so far. Had a lesion effect, but his tremor is essentially back at its baseline now. Scheduled for initial programming next week and is excited for this. Has some double or blurry vision when reading for a while. Had a lot of pain initially postop related to the pin sites, but that has resolved. Wondering if we can use a different local anesthetic mixture for pin site infiltration next time since it seemed mostly ineffective last time. Remains very interested in treating his left body/right brain as well. Will need neuropsych testing follow up at 6 months prior to scheduling.     Exam:  Awake, alert, oriented x 3  Attends, follows, fluent  PERRL  EOMI  FS  TML  BUE/BLE 5/5, no drift  Wounds all c/d/i, no erythema, swelling or tenderness    Programming schedule: next week with Dr. Plummer    UPDRS/TETRAS outcome: TBD    A/P: 71 y/o gentleman with ET s/p left ViM DBS, doing well.    - RTC in 3 weeks  - Programming per movement disorders team  - Slowly increase physical activity

## 2022-10-23 NOTE — PROGRESS NOTES
Department of Neurology  Movement Disorders Division   DBS Follow-up Note    Patient: Arley Butt  MRN: [id number]   : 1950   Date of Visit: 10/24/2022    Diagnosis: Essential tremor  DBS Target(s): Left VIM  Date(s) of DBS Lead Placement: 2022  Date(s) of IPG Placement: L chest 10/4/2022  Device: Tuloko Genus      Chief Complaint:  Arley Butt is a 72 year old male who returns to clinic for follow up of ET status post left VIM DBS.       Interval History:  His tremor improved for 1 day following surgery.  Denies any changes in his speech, cognition, or gait.  He has noticed that his vision blurs while reading since the surgery.  This has started to improve.         Tremor ADL Scale  1. Speaking 0 (0) Normal   2. Feeding with a spoon 3 (3) Moderately abnormal. Spills a lot or changes strategy to complete task such as using two hands or leaning over.   3. Drinking from a glass 3 (3) Moderately abnormal. Spills a lot or changes strategy to complete task such as using two hands or leaning over.   4. Hygiene 1 (1) Slightly abnormal. Tremor is present but does not interfere with hygiene.   5. Dressing 1 (1) Slightly abnormal. Tremor is present but does not interfere with dressing.   6. Pouring 3 (3) Moderately abnormal. Must use two hands or uses other strategies to avoid spilling.   7. Carrying food trays, plates or similar items 0 (0) Normal   8. Using keys 3 (3) Moderately abnormal. Needs to use two hands or other strategies to put key in lock.   9. Writing 4 (4) Severely abnormal. Cannot write.   10. Working 2 (2) Mildly abnormal. Tremor interferes with work, able to do everything, but with errors.   11. Overall disability with the most affected task 4 (4) Severely abnormal. Cannot do the task.   Name of most affected task Writing Writing   12. Social impact 1 (1) Aware of tremor, but it does not affect lifestyle or professional life.   ADL TOTAL 25    Prior: 26 on 2022      Related  Medications           Gabapentin 300mg 2 2 2 3   Last taken: last night 10/23/2022 about 9pm      Medications:  Current Outpatient Medications   Medication Sig Dispense Refill     acetaminophen (TYLENOL) 500 MG tablet Take 500-1,000 mg by mouth every 8 hours as needed for mild pain       Carboxymethylcellulose Sodium 1 % GEL Apply 1 drop to eye nightly as needed       cyproheptadine (PERIACTIN) 4 MG tablet Take 12 mg by mouth At Bedtime       finasteride (PROSCAR) 5 MG tablet Take 1 tablet by mouth daily       folic acid (FOLVITE) 1 MG tablet Take 1 tablet by mouth daily       gabapentin (NEURONTIN) 300 MG capsule Patient reports taking 2 x 300 mg tid + 3 x 300 mg at bedtime ([facility] [place]10.[place]    Takes 300 mg capsule and 600 mg tablet (see other order as well)   Dosing as follows:    AM:  300 mg + 600 mg = 900 mg total  Afternoon: 300 mg + 600 mg = 900 mg total  Evenin mg x2 + 600 mg = 1200 mg total  Bedtime: 300 mg x3 + 600 mg = 1200 mg total       mirtazapine (REMERON) 30 MG tablet Take 15 mg by mouth daily       NONFORMULARY Nocturnal CPAP with 2L oxygen       pantoprazole (PROTONIX) 40 MG EC tablet Take 40 mg by mouth 2 times daily       polyethylene glycol (MIRALAX) 17 g packet Take 17 g by mouth daily 30 packet 0     prazosin (MINIPRESS) 5 MG capsule Take 10 mg by mouth At Bedtime       propylene glycol (SYSTANE BALANCE) 0.6 % SOLN ophthalmic solution Apply 1 drop to eye 4 times daily as needed       SENNA-docusate sodium (SENNA S) 8.6-50 MG tablet Take 2 tablets by mouth 2 times daily 60 tablet 0     sertraline (ZOLOFT) 100 MG tablet Take 150 mg by mouth daily       simvastatin (ZOCOR) 40 MG tablet Take 0.5 tablets by mouth At Bedtime       tamsulosin (FLOMAX) 0.4 MG capsule Take 1 capsule by mouth daily       triamcinolone (KENALOG) 0.1 % external cream Apply 1 Film topically daily as needed       cholecalciferol 50 MCG (2000) tablet TAKE ONE TABLET BY MOUTH DAILY FOR VITAMIN D SUPPLEMENT (Patient not  taking: Reported on 10/18/2022)       gabapentin (NEURONTIN) 600 MG tablet Takes 300 mg capsule and 600 mg tablet (see other order as well)   Dosing as follows:    AM:  300 mg + 600 mg = 900 mg total  Afternoon: 300 mg + 600 mg = 900 mg total  Evenin mg x2 + 600 mg = 1200 mg total  Bedtime: 300 mg x3 + 600 mg = 1200 mg total (Patient not taking: Reported on 10/18/2022)       oxyCODONE (ROXICODONE) 5 MG tablet Take 1 tablet (5 mg) by mouth every 6 hours as needed for pain (Patient not taking: Reported on 10/18/2022) 12 tablet 0     senna (SENOKOT) 8.6 MG tablet Take 1 tablet by mouth daily (Patient not taking: Reported on 10/18/2022)       vitamin B-12 (CYANOCOBALAMIN) 1000 MCG tablet Take 1 tablet by mouth daily (Patient not taking: Reported on 10/18/2022)          Review of Systems:  Other than that noted at the end of this note, the remainder of 12 systems reviewed were negative.    Allergies: has No Known Allergies.    Past Medical History:  Past Medical History:   Diagnosis Date     Alcohol dependence (H)      Anemia      Anxiety      BPH (benign prostatic hyperplasia)      Cataract      Chronic pain      DVT (deep venous thrombosis) (H)      Dysphagia      Dysthymia      Erosive gastritis      Esophagitis      Follicular lymphoma (H)      Foot sprain      Gastroesophageal reflux disease with esophagitis      HTN (hypertension)      Hyperlipidemia      Impacted cerumen      MCI (mild cognitive impairment)      Osteoarthritis      Presbyopia      PTSD (post-traumatic stress disorder)      Sensorineural hearing loss, bilateral      Tinnitus, bilateral      Urinary frequency        Past Surgical History:  Past Surgical History:   Procedure Laterality Date     ANKLE SURGERY Right      BACK SURGERY      lower back, herniated disc     CYSTECTOMY      pilonidal     IMPLANT DEEP BRAIN STIMULATION GENERATOR / BATTERY Left 10/4/2022    Procedure: Deep brain stimulator placement, phase II, placement of deep brain  stimulator generator/battery over the left chest wall;  Surgeon: Manuel Otero MD;  Location: [facility] OR     OPTICAL TRACKING SYSTEM INSERTION DEEP BRAIN STIMULATION Left 9/26/2022    Procedure: stealth assisted Left side deep brain stimulator placement, phase I, placement of left side deep brain stimulator electrode, target left ventral intermediate nucleus of the thalamus, with microelectrode recording;  Surgeon: Manuel Otero MD;  Location: [facility] OR     VASECTOMY         Social History:  Social History     Socioeconomic History     Marital status:    Tobacco Use     Smoking status: Former     Smokeless tobacco: Never       Family History:  Family History   Problem Relation Age of Onset     Tremor Father          Physical Exam:  The patient's  blood pressure is 94/61 and his pulse is 63. His oxygen saturation is 96%.      Incisions: clean, dry, intact     Neurological Examination:   Last medication dose: last night 10/23/2022 at 9:00pm  Gait: normal  Motor (Performance) Sub-Scale 10/24/2022   Assessment Time 10:03 AM   Medication Off   DBS - Right Brain None   DBS - Left Brain None   Head 1   Face & Jaw 0   Voice 1   Outstretched - RIGHT 1   Outstretched - LEFT 0.5   Wingbeating - RIGHT 0.5   Wingbeating - LEFT 1.5   Kinetic - RIGHT 1.5   Kinetic - LEFT 1.5   Lower Limb - RIGHT 0   Lower Limb - LEFT 0   Lower Limb (Max) 0   Spiral - RIGHT 2   Spiral - LEFT 1   Handwriting 4   Dot approx - RIGHT 2   Dot approx - LEFT 1   Trunk (Standing) 0   Total Right 7   Total Left 5.5   Axial 2   TOTAL 18.5   Prior: 18.5 on 4/13/2022        Procedure: DBS Interrogation & Programming  Lead(s):    Left   DBS Target VIM   DBS Lead Type Cartesia   Lead Implant Date 9/26/2022     IPG(s):   1   IPG Genus R16   IPG Implant Date 10/4/2022   Location Left chest   Battery (V) /     Impedance Check: No problems found  See scanned report for impedance details.      L VIM:  Contacts Amplitude PW Frequency Effect SE   C+L1- 0.5 60  130  NN    1.0    NN    1.5    Can't move arm   C+L2- 0.5 60 130      1.0   Spiral slightly better NN    1.5    NN    2.0    Hard to move arm and leg   C+L3- 0.5 60 130  NN    1.0   Spiral slightly better NN    1.5    NN    2.0   Spiral 1 NN    2.5    NN    3.0    Face heavy    0.0    Resolved   C+L4- 0.5 60 130  NN    1.0   NC NN    1.5    NN    2.0   NC NN    2.5    NN    3.0   Spiral slightly better NN    3.5    NN    4.0    NN    4.5   Dysarthria Cheeks fluttering    0.0    Resolved    1.0 60 149 Spiral slightly better NN    2.0   Spiral 1 NN    3.0    RLE heavy    2.5    resolved    1.0 30 149  NN    2.0   NC NN    3.0   Spiral 0.5 NN    4.0   Spiral 1 NN    5.0   Spiral 1.5 NN    4.0   Gait stable NN   C_L3- 1.0 30 130 NC NN    2.0   Spiral 1 NN    3.0   Spiral 1.5 NN    4.0   Spiral 1 NN    4.5    Right face tremor    5.0   Spiral 1.5 NN    1.0 30 149 Spiral 2 NN    2.0   Spiral 1.5 NN    3.0   Spiral 0, handwriting mildly improved NN    4.0    Leg stiffness    3.5   Spiral 0.5 Resolved    1.0 30 170 Spiral 0, handwriting same NN    2.0   Spiral 0.5, handwriting mildly improved NN    3.0    Leg stiffness    2.5    resolved   C+L3L4- (50/50) 1.0 30 149  NN    2.0   Spiral 1 NN    3.0   Spiral 0.5, handwriting improved NN    4.0   Spiral 0.5, handwriting not as good NN    4.5    Leg heavy    4.0    resolved    1.0 30 170  NN    2.0   Spiral 0.5, handwriting better NN    3.0   Spiral 0.5, handwriting not as good NN    4.0   Spiral 0.5, handwriting better     5.0    Leg heavy    4.5    resolved   C+L3-  C_L4-  interleaving 3.0  1.0 30  60 149  104 Spiral 0.5, handwriting slightly better NN    3.0  2.0   NC NN    3.0  3.0    Leg heavy    3.0  2.5    resolved    3.0  1.0 30  30 149  104 Spiral 0.5, handing writing improved NN    3.0  2.0   Spiral 0, handwriting bit worse NN    3.0  3.0   Spiral 0.5, handwriting better NN    3.0  4.0   NC NN    3.0  5.0   NC NN         Program 1 Left Brain             Initial Final     None Inactive   Amplitude (mA)   3.0 [0-3.5]   Pulse width (usec)   30   Freq (Hz)   149   Contacts:   C+L3-      Program 2 Left Brain             Initial Final    None Inactive   Amplitude /mA)   3.0 [0-4.0]   Pulse width (usec)   30   Freq (Hz)   149   Contacts:   C+L4-     Program 3 Left Brain             Initial Final    None Active   Amplitude /mA)   4.0 [0-4.5]   Pulse width (usec)   30   Freq (Hz)   170   Contacts:   C+L3L4- (50/50)           Assessment/Plan:  Arley Butt is a 72 year old male with ET s/p left VIM DBS who presents today for initial L VIM monopolar review and programming.  On initial monopolar review, L4 had the widest side effect threshold but L3 resulted in improved tremor.  Increased frequency resulted in narrowed side effect threshold. When paired with reduced pulse width, resulted in better tremor improvement on spirals.  His handwriting remained poor.  Then attempted double monopolar and interleaving of L3 and L4.  Best tremor control was with double monopolar.  Program 1 was based on the best settings for L3, program 2 for L4, and Program 3 on double monopolar.  Program 3 was better than 1 which was better than 2.    - Program 3, increase by 0.2 as needed for tremor control   - Next would try program 1 before program 2  - RTC 4 weeks, 1 hr DBS programming      Kaylynn Plummer MD    Movement Disorders Division  Department of Neurology       48 minutes spent on the date of the encounter doing chart review, history and exam, documentation and further activities as noted above.     Additional time spent for separate DBS programmin min DBS analyzed with reprogramming.

## 2022-10-24 ENCOUNTER — OFFICE VISIT (OUTPATIENT)
Dept: NEUROLOGY | Facility: CLINIC | Age: 72
End: 2022-10-24
Payer: COMMERCIAL

## 2022-10-24 ENCOUNTER — ANCILLARY PROCEDURE (OUTPATIENT)
Dept: CT IMAGING | Facility: CLINIC | Age: 72
End: 2022-10-24
Attending: STUDENT IN AN ORGANIZED HEALTH CARE EDUCATION/TRAINING PROGRAM
Payer: COMMERCIAL

## 2022-10-24 VITALS — DIASTOLIC BLOOD PRESSURE: 61 MMHG | SYSTOLIC BLOOD PRESSURE: 94 MMHG | OXYGEN SATURATION: 96 % | HEART RATE: 63 BPM

## 2022-10-24 DIAGNOSIS — G25.0 ESSENTIAL TREMOR: ICD-10-CM

## 2022-10-24 DIAGNOSIS — G25.0 ESSENTIAL TREMOR: Primary | ICD-10-CM

## 2022-10-24 DIAGNOSIS — Z96.89 S/P DEEP BRAIN STIMULATOR PLACEMENT: ICD-10-CM

## 2022-10-24 PROCEDURE — 95983 ALYS BRN NPGT PRGRMG 15 MIN: CPT | Performed by: STUDENT IN AN ORGANIZED HEALTH CARE EDUCATION/TRAINING PROGRAM

## 2022-10-24 PROCEDURE — 99215 OFFICE O/P EST HI 40 MIN: CPT | Mod: 25 | Performed by: STUDENT IN AN ORGANIZED HEALTH CARE EDUCATION/TRAINING PROGRAM

## 2022-10-24 PROCEDURE — 95984 ALYS BRN NPGT PRGRMG ADDL 15: CPT | Performed by: STUDENT IN AN ORGANIZED HEALTH CARE EDUCATION/TRAINING PROGRAM

## 2022-10-24 PROCEDURE — 70450 CT HEAD/BRAIN W/O DYE: CPT | Performed by: RADIOLOGY

## 2022-10-24 ASSESSMENT — ACTIVITIES OF DAILY LIVING (ADL)
OVERALL_DISABILITY_WITH_THE_MOST_AFFECTED_TASK: WRITING
WORKING: (2) MILDLY ABNORMAL. TREMOR INTERFERES WITH WORK, ABLE TO DO EVERYTHING, BUT WITH ERRORS.
OVERALL_DISABILITY_WITH_THE_MOST_AFFECTED_TASK: (4) SEVERELY ABNORMAL. CANNOT DO THE TASK.
SOCIAL_IMPACT: (1) AWARE OF TREMOR, BUT IT DOES NOT AFFECT LIFESTYLE OR PROFESSIONAL LIFE.
POURING: (3) MODERATELY ABNORMAL. MUST USE TWO HANDS OR USES OTHER STRATEGIES TO AVOID SPILLING.
USING_KEYS: (3) MODERATELY ABNORMAL. NEEDS TO USE TWO HANDS OR OTHER STRATEGIES TO PUT KEY IN LOCK.
CARRYING_FOOD_TRAYS_PLATES_OR_SIMILAR_ITEMS: (0) NORMAL
FEEDING_WITH_A_SPOON: (3) MODERATELY ABNORMAL. SPILLS A LOT OR CHANGES STRATEGY TO COMPLETE TASK SUCH AS USING TWO HANDS OR LEANING OVER.
HYGIENE: (1) SLIGHTLY ABNORMAL. TREMOR IS PRESENT BUT DOES NOT INTERFERE WITH HYGIENE.
DRESS: (1) SLIGHTLY ABNORMAL. TREMOR IS PRESENT BUT DOES NOT INTERFERE WITH DRESSING.
DRINKING_FROM_A_GLASS: (3) MODERATELY ABNORMAL. SPILLS A LOT OR CHANGES STRATEGY TO COMPLETE TASK SUCH AS USING TWO HANDS OR LEANING OVER.
WRITING: (4) SEVERELY ABNORMAL. CANNOT WRITE.
TOTAL_SCORE: 25
SPEAKING: (0) NORMAL

## 2022-10-24 NOTE — PATIENT INSTRUCTIONS
Today we turned on your right body.  I made 3 programs.  You went home on Program 3.  Program 3 is set at 4.0.  If you get more tremor, turn it up by 0.2 (2 clicks)  The highest you can turn program 3 to is 4.5.  If you get to 4.5 and still have tremor, let me know and I'll instruct you to turn to another program.        Charge the battery in your chest every week until you hear the double beep.

## 2022-10-24 NOTE — LETTER
10/24/2022       RE: Arley Butt  [address] [facility] [address]     Dear Colleague,    Thank you for referring your patient, Arley Butt, to the [facility] [place] [facility]. Please see a copy of my visit note below.    Department of Neurology  Movement Disorders Division   DBS Follow-up Note    Patient: Arley Butt  MRN: [id number]   : 1950   Date of Visit: 10/24/2022    Diagnosis: Essential tremor  DBS Target(s): Left VIM  Date(s) of DBS Lead Placement: 2022  Date(s) of IPG Placement: L chest 10/4/2022  Device: [employer]      Chief Complaint:  Arley Butt is a 72 year old male who returns to clinic for follow up of ET status post left VIM DBS.       Interval History:  His tremor improved for 1 day following surgery.  Denies any changes in his speech, cognition, or gait.  He has noticed that his vision blurs while reading since the surgery.  This has started to improve.         Tremor ADL Scale  1. Speaking 0 (0) Normal   2. Feeding with a spoon 3 (3) Moderately abnormal. Spills a lot or changes strategy to complete task such as using two hands or leaning over.   3. Drinking from a glass 3 (3) Moderately abnormal. Spills a lot or changes strategy to complete task such as using two hands or leaning over.   4. Hygiene 1 (1) Slightly abnormal. Tremor is present but does not interfere with hygiene.   5. Dressing 1 (1) Slightly abnormal. Tremor is present but does not interfere with dressing.   6. Pouring 3 (3) Moderately abnormal. Must use two hands or uses other strategies to avoid spilling.   7. Carrying food trays, plates or similar items 0 (0) Normal   8. Using keys 3 (3) Moderately abnormal. Needs to use two hands or other strategies to put key in lock.   9. Writing 4 (4) Severely abnormal. Cannot write.   10. Working 2 (2) Mildly abnormal. Tremor interferes with work, able to do everything, but with  errors.   11. Overall disability with the most affected task 4 (4) Severely abnormal. Cannot do the task.   Name of most affected task Writing Writing   12. Social impact 1 (1) Aware of tremor, but it does not affect lifestyle or professional life.   ADL TOTAL 25    Prior: 26 on 2022      Related Medications           Gabapentin 300mg 2 2 2 3   Last taken: last night 10/23/2022 about 9pm      Medications:  Current Outpatient Medications   Medication Sig Dispense Refill     acetaminophen (TYLENOL) 500 MG tablet Take 500-1,000 mg by mouth every 8 hours as needed for mild pain       Carboxymethylcellulose Sodium 1 % GEL Apply 1 drop to eye nightly as needed       cyproheptadine (PERIACTIN) 4 MG tablet Take 12 mg by mouth At Bedtime       finasteride (PROSCAR) 5 MG tablet Take 1 tablet by mouth daily       folic acid (FOLVITE) 1 MG tablet Take 1 tablet by mouth daily       gabapentin (NEURONTIN) 300 MG capsule Patient reports taking 2 x 300 mg tid + 3 x 300 mg at bedtime [place]10.[place]    Takes 300 mg capsule and 600 mg tablet (see other order as well)   Dosing as follows:    AM:  300 mg + 600 mg = 900 mg total  Afternoon: 300 mg + 600 mg = 900 mg total  Evenin mg x2 + 600 mg = 1200 mg total  Bedtime: 300 mg x3 + 600 mg = 1200 mg total       mirtazapine (REMERON) 30 MG tablet Take 15 mg by mouth daily       NONFORMULARY Nocturnal CPAP with 2L oxygen       pantoprazole (PROTONIX) 40 MG EC tablet Take 40 mg by mouth 2 times daily       polyethylene glycol (MIRALAX) 17 g packet Take 17 g by mouth daily 30 packet 0     prazosin (MINIPRESS) 5 MG capsule Take 10 mg by mouth At Bedtime       propylene glycol (SYSTANE BALANCE) 0.6 % SOLN ophthalmic solution Apply 1 drop to eye 4 times daily as needed       SENNA-docusate sodium (SENNA S) 8.6-50 MG tablet Take 2 tablets by mouth 2 times daily 60 tablet 0     sertraline (ZOLOFT) 100 MG tablet Take 150 mg by mouth daily       simvastatin (ZOCOR) 40 MG tablet Take 0.5  tablets by mouth At Bedtime       tamsulosin (FLOMAX) 0.4 MG capsule Take 1 capsule by mouth daily       triamcinolone (KENALOG) 0.1 % external cream Apply 1 Film topically daily as needed       cholecalciferol 50 MCG (2000) tablet TAKE ONE TABLET BY MOUTH DAILY FOR VITAMIN D SUPPLEMENT (Patient not taking: Reported on 10/18/2022)       gabapentin (NEURONTIN) 600 MG tablet Takes 300 mg capsule and 600 mg tablet (see other order as well)   Dosing as follows:    AM:  300 mg + 600 mg = 900 mg total  Afternoon: 300 mg + 600 mg = 900 mg total  Evenin mg x2 + 600 mg = 1200 mg total  Bedtime: 300 mg x3 + 600 mg = 1200 mg total (Patient not taking: Reported on 10/18/2022)       oxyCODONE (ROXICODONE) 5 MG tablet Take 1 tablet (5 mg) by mouth every 6 hours as needed for pain (Patient not taking: Reported on 10/18/2022) 12 tablet 0     senna (SENOKOT) 8.6 MG tablet Take 1 tablet by mouth daily (Patient not taking: Reported on 10/18/2022)       vitamin B-12 (CYANOCOBALAMIN) 1000 MCG tablet Take 1 tablet by mouth daily (Patient not taking: Reported on 10/18/2022)          Review of Systems:  Other than that noted at the end of this note, the remainder of 12 systems reviewed were negative.    Allergies: has No Known Allergies.    Past Medical History:  Past Medical History:   Diagnosis Date     Alcohol dependence (H)      Anemia      Anxiety      BPH (benign prostatic hyperplasia)      Cataract      Chronic pain      DVT (deep venous thrombosis) (H)      Dysphagia      Dysthymia      Erosive gastritis      Esophagitis      Follicular lymphoma (H)      Foot sprain      Gastroesophageal reflux disease with esophagitis      HTN (hypertension)      Hyperlipidemia      Impacted cerumen      MCI (mild cognitive impairment)      Osteoarthritis      Presbyopia      PTSD (post-traumatic stress disorder)      Sensorineural hearing loss, bilateral      Tinnitus, bilateral      Urinary frequency        Past Surgical History:  Past  Surgical History:   Procedure Laterality Date     ANKLE SURGERY Right      BACK SURGERY      lower back, herniated disc     CYSTECTOMY      pilonidal     IMPLANT DEEP BRAIN STIMULATION GENERATOR / BATTERY Left 10/4/2022    Procedure: Deep brain stimulator placement, phase II, placement of deep brain stimulator generator/battery over the left chest wall;  Surgeon: Manuel Otero MD;  Location: [facility] OR     OPTICAL TRACKING SYSTEM INSERTION DEEP BRAIN STIMULATION Left 9/26/2022    Procedure: stealth assisted Left side deep brain stimulator placement, phase I, placement of left side deep brain stimulator electrode, target left ventral intermediate nucleus of the thalamus, with microelectrode recording;  Surgeon: Manuel Otero MD;  Location: [facility] OR     VASECTOMY         Social History:  Social History     Socioeconomic History     Marital status:    Tobacco Use     Smoking status: Former     Smokeless tobacco: Never       Family History:  Family History   Problem Relation Age of Onset     Tremor Father          Physical Exam:  The patient's  blood pressure is 94/61 and his pulse is 63. His oxygen saturation is 96%.      Incisions: clean, dry, intact     Neurological Examination:   Last medication dose: last night 10/23/2022 at 9:00pm  Gait: normal  Motor (Performance) Sub-Scale 10/24/2022   Assessment Time 10:03 AM   Medication Off   DBS - Right Brain None   DBS - Left Brain None   Head 1   Face & Jaw 0   Voice 1   Outstretched - RIGHT 1   Outstretched - LEFT 0.5   Wingbeating - RIGHT 0.5   Wingbeating - LEFT 1.5   Kinetic - RIGHT 1.5   Kinetic - LEFT 1.5   Lower Limb - RIGHT 0   Lower Limb - LEFT 0   Lower Limb (Max) 0   Spiral - RIGHT 2   Spiral - LEFT 1   Handwriting 4   Dot approx - RIGHT 2   Dot approx - LEFT 1   Trunk (Standing) 0   Total Right 7   Total Left 5.5   Axial 2   TOTAL 18.5   Prior: 18.5 on 4/13/2022        Procedure: DBS Interrogation & Programming  Lead(s):    Left   DBS Target VIM    DBS Lead Type Cartesia   Lead Implant Date 9/26/2022     IPG(s):   1   IPG Genus R16   IPG Implant Date 10/4/2022   Location Left chest   Battery (V) /     Impedance Check: No problems found  See scanned report for impedance details.      L VIM:  Contacts Amplitude PW Frequency Effect SE   C+L1- 0.5 60 130  NN    1.0    NN    1.5    Can't move arm   C+L2- 0.5 60 130      1.0   Spiral slightly better NN    1.5    NN    2.0    Hard to move arm and leg   C+L3- 0.5 60 130  NN    1.0   Spiral slightly better NN    1.5    NN    2.0   Spiral 1 NN    2.5    NN    3.0    Face heavy    0.0    Resolved   C+L4- 0.5 60 130  NN    1.0   NC NN    1.5    NN    2.0   NC NN    2.5    NN    3.0   Spiral slightly better NN    3.5    NN    4.0    NN    4.5   Dysarthria Cheeks fluttering    0.0    Resolved    1.0 60 149 Spiral slightly better NN    2.0   Spiral 1 NN    3.0    RLE heavy    2.5    resolved    1.0 30 149  NN    2.0   NC NN    3.0   Spiral 0.5 NN    4.0   Spiral 1 NN    5.0   Spiral 1.5 NN    4.0   Gait stable NN   C_L3- 1.0 30 130 NC NN    2.0   Spiral 1 NN    3.0   Spiral 1.5 NN    4.0   Spiral 1 NN    4.5    Right face tremor    5.0   Spiral 1.5 NN    1.0 30 149 Spiral 2 NN    2.0   Spiral 1.5 NN    3.0   Spiral 0, handwriting mildly improved NN    4.0    Leg stiffness    3.5   Spiral 0.5 Resolved    1.0 30 170 Spiral 0, handwriting same NN    2.0   Spiral 0.5, handwriting mildly improved NN    3.0    Leg stiffness    2.5    resolved   C+L3L4- (50/50) 1.0 30 149  NN    2.0   Spiral 1 NN    3.0   Spiral 0.5, handwriting improved NN    4.0   Spiral 0.5, handwriting not as good NN    4.5    Leg heavy    4.0    resolved    1.0 30 170  NN    2.0   Spiral 0.5, handwriting better NN    3.0   Spiral 0.5, handwriting not as good NN    4.0   Spiral 0.5, handwriting better     5.0    Leg heavy    4.5    resolved   C+L3-  C_L4-  interleaving 3.0  1.0 30  60 149  104 Spiral 0.5, handwriting slightly better NN    3.0  2.0   NC NN     3.0  3.0    Leg heavy    3.0  2.5    resolved    3.0  1.0 30  30 149  104 Spiral 0.5, handing writing improved NN    3.0  2.0   Spiral 0, handwriting bit worse NN    3.0  3.0   Spiral 0.5, handwriting better NN    3.0  4.0   NC NN    3.0  5.0   NC NN         Program 1 Left Brain             Initial Final    None Inactive   Amplitude (mA)   3.0 [0-3.5]   Pulse width (usec)   30   Freq (Hz)   149   Contacts:   C+L3-      Program 2 Left Brain             Initial Final    None Inactive   Amplitude /mA)   3.0 [0-4.0]   Pulse width (usec)   30   Freq (Hz)   149   Contacts:   C+L4-     Program 3 Left Brain             Initial Final    None Active   Amplitude /mA)   4.0 [0-4.5]   Pulse width (usec)   30   Freq (Hz)   170   Contacts:   C+L3L4- (50/50)           Assessment/Plan:  Arley DIANNA Butt is a 72 year old male with ET s/p left VIM DBS who presents today for initial L VIM monopolar review and programming.  On initial monopolar review, L4 had the widest side effect threshold but L3 resulted in improved tremor.  Increased frequency resulted in narrowed side effect threshold. When paired with reduced pulse width, resulted in better tremor improvement on spirals.  His handwriting remained poor.  Then attempted double monopolar and interleaving of L3 and L4.  Best tremor control was with double monopolar.  Program 1 was based on the best settings for L3, program 2 for L4, and Program 3 on double monopolar.  Program 3 was better than 1 which was better than 2.    - Program 3, increase by 0.2 as needed for tremor control   - Next would try program 1 before program 2  - RTC 4 weeks, 1 hr DBS programming      Kaylynn Plummer MD    Movement Disorders Division  Department of Neurology       48 minutes spent on the date of the encounter doing chart review, history and exam, documentation and further activities as noted above.     Additional time spent for separate DBS programmin min DBS analyzed with  reprogramming.

## 2022-11-03 ENCOUNTER — TELEPHONE (OUTPATIENT)
Dept: NEUROSURGERY | Facility: CLINIC | Age: 72
End: 2022-11-03

## 2022-11-03 NOTE — TELEPHONE ENCOUNTER
Left VIM DBS for right body tremors, [employer].     Spoke with pt's daughter Svitlana. She said that pt had recently increased his DBS program to the top of the range (4.5) on program 3, so they switched pt to program 2. There was confusion about what program pt was initially on, so in reviewing Dr. Plummer's progress note and pt instructions from 10/24/22, I confirmed that pt had been on program 3. The pt and family, however, thought pt had been on program 1 so moved him to program 2 on Tuesday.     This morning, pt's daughter noticed a 30 second episode of pt's left arm and left leg visibly shaking. He then began leaning to the left. The episode resolved on its own. Pt did not experience any cognitive changes, speech or vision changes, headache, or any weakness.     I reviewed the AVS with pt's daughter, which says the following:     Today we turned on your right body.  I made 3 programs.  You went home on Program 3.  Program 3 is set at 4.0.  If you get more tremor, turn it up by 0.2 (2 clicks)  The highest you can turn program 3 to is 4.5.  If you get to 4.5 and still have tremor, let me know and I'll instruct you to turn to another program.    Dr. Plummer's progress note from 10/24 says the following:     - Program 3, increase by 0.2 as needed for tremor control              - Next would try program 1 before program 2  - RTC 4 weeks, 1 hr DBS programming    Pt has been on program 2 since Tuesday. Will f/u with movement disorder team to advise pt/daughter on next steps.     Reviewed with pt's daughter that changes in tremor control are not an emergency. If there is suspicion of stimulation side effect that corresponds to a change in stimulation, such as slurred speech, then stimulation can be decreased or turned off to see if goes away. If there is an actual emergency, such as stroke symptoms, call 911.     Gave her the number to reach the movement disorder team.     Nothing further at this time.

## 2022-11-03 NOTE — TELEPHONE ENCOUNTER
Attn: Lana Rodriguez, RNCC for Dr. Otero   *DBS patient with symptoms.     Steff Wynne LPN  Neurosurgery

## 2022-11-03 NOTE — TELEPHONE ENCOUNTER
Health Call Center    Phone Message    May a detailed message be left on voicemail: yes     Reason for Call: Other: Pt daughter Svitlana called and stated that he had a DBS placed by Dr. Otero on the left side of his brain to control right side tremors. She stated that this morning 11/03 there were visible tremors on his right side both his arm and his leg. He was also leaning to the left and almost fell.     She also stated that on Monday 11/01 they had a home care nurse in and she stated that his eyes were not dilating and they are unsure if this is related.     Please call Svitlana back ASA to discuss.      Action Taken: Message routed to:  [facility] [place] [facility]    Travel Screening: Not Applicable

## 2022-11-03 NOTE — TELEPHONE ENCOUNTER
"Situation:    This morning he had a \"shaking\" episode that started with left arm went to left leg. Then simultaneously went to other side. Lasted 30-60 sec.    Background:     Patient is S/P LVIM 9/26/22, initial programming 10/24.    From Dr. Plummer's note on 10/24, good tremor control was at contact L3- but program 3 was chosen as it provided the best control. This program interleaved L3 and L4.    Assessment:    The \"shaking\" episode was described as follows, \"It looked like when someone is soaking wet and shaking arm to flip water off - this was the movement. Looked like he was shaking something off his arm and leg, shoulder to hand. Hip to foot.\". It lasted 30-60 sec.    Interesting as the shaking began on the left side and went immediately to the right. Deep Brain Stimulation is in left brain to treat right body.    Patient's speech coherent and clear, no complaints of weakness, dizzyness, headache, no facial drooping. No history of seizures.    On program 2 family and patient reports his writing is awful. Feels fine. No tremor benefit.    Recommendation:  Program 1 is case+ L3-, will instruct to go to program 1 as was described in Dr. Plummer's note. Patient able to change to program 1 without any problem. Writer instructed him    1. Start at 3.0 mA, do not turn it up until tomorrow if you have to.  2. Only turn it up to 3.1 and no changes over the weekend.  3. Call on Monday if need more tremor control, or send MyChart with update on how it is going.  4. Watch for:  Stroke warning signs and symptoms - CALL 911 right away for:  - Sudden numbness or weakness in the face, arm or leg (often on one side of the body).  - Sudden confusion or trouble understanding what is going on.  - Sudden blurred or decreased vision in one or both eyes.  - Sudden trouble speaking, loss of balance, dizziness or problems with coordination.  - Sudden, severe headache for no reason.  - Fainting or seizures.  - Symptoms may go away " then come back suddenly.    No further questions and they all agreed on this plan of care.

## 2022-11-09 NOTE — TELEPHONE ENCOUNTER
He changed to Program 1 when instructed to by Shikha. Has had some improvement in writing (has been signing name daily) and eating in his right hand. On Monday he increased to 3.1 with further improvement in writing and eating but he still needs to pore with both hands. He feels he could use some more improvement and would like to increase stimulation. Denies stimulation induced side effects.    Denies further shaking episodes such as the one reported on 11/3. No falls, worsening symptoms or new symptoms.    Plan/Recommendation:  1. Im glad he has not had further shaking episodes. I will connect with Dr. Plummer on how he should increase his stimulation

## 2022-11-09 NOTE — TELEPHONE ENCOUNTER
" Health Call Center    Phone Message    May a detailed message be left on voicemail: yes     Reason for Call:     Svitlana patients daughter called requesting to speak to ANA Avila regarding patients DBS. Svitlana asking to \"up\" his DBS but they can not do so until they speak to nurse. Svitlana can be reached anytime today        Action Taken: Message routed to:  [facility] neurology    Travel Screening: Not Applicable                                                                        "

## 2022-11-11 NOTE — TELEPHONE ENCOUNTER
Recommend that he continue to increase his stimulation on Program 1.  Can increase by 0.1 every day until tremors are improved or he hits the max on his  which is 3.5.

## 2022-11-11 NOTE — TELEPHONE ENCOUNTER
[facility] Call [facility]    Phone Message    May a detailed message be left on voicemail: yes     Reason for Call:     Patients daughter Svitlana called, requesting a call back from ANA Avila to discuss changes to DBS.    Action Taken: Message routed to:  [facility] [place]    Travel Screening: Not Applicable

## 2022-11-28 ENCOUNTER — TELEPHONE (OUTPATIENT)
Dept: NEUROSURGERY | Facility: CLINIC | Age: 72
End: 2022-11-28

## 2022-11-28 NOTE — TELEPHONE ENCOUNTER
Called pt's daughter Svitlana back. She would like to change pt's 11/29/22 clinic appt d/t anticipated poor driving conditions. We will reschedule to 12/13/22 at 10:30. Clinic scheduler notified. No further questions at this time.

## 2022-12-04 NOTE — PROGRESS NOTES
Department of Neurology  Movement Disorders Division   DBS Follow-up Note    Patient: Arley Butt  MRN: [id number]   : 1950   Date of Visit: 2022    Diagnosis: Essential tremor  DBS Target(s): Left VIM  Date(s) of DBS Lead Placement: 2022  Date(s) of IPG Placement: L chest 10/4/2022  Device: TESARO      Chief Complaint:  Arley Butt is a 72 year old male who returns to clinic for follow up of ET status post left VIM DBS.   He was last seen 10/24/2022 at which time he underwent L VIM initial programming    Interval History:  He thinks that his tremor is worse if anything.  He has increased the stimulation.  No adverse effects.  Didn't notice any difference between program 3 and 1.       Tremor ADL Scale  1. Speaking 0 (0) Normal   2. Feeding with a spoon 3 (3) Moderately abnormal. Spills a lot or changes strategy to complete task such as using two hands or leaning over.   3. Drinking from a glass 3 (3) Moderately abnormal. Spills a lot or changes strategy to complete task such as using two hands or leaning over.   4. Hygiene 0 (0) Normal   5. Dressing 2 (zippers) (2) Mildly abnormal. Able to do everything but has difficulty due to tremor. (zippers)   6. Pouring 3 (3) Moderately abnormal. Must use two hands or uses other strategies to avoid spilling.   7. Carrying food trays, plates or similar items 0 (0) Normal   8. Using keys       9. Writing 4 (4) Severely abnormal. Cannot write.   10. Working 0 (0) Normal   11. Overall disability with the most affected task 4 (4) Severely abnormal. Cannot do the task.   Name of most affected task Writing Writing   12. Social impact 1 (1) Aware of tremor, but it does not affect lifestyle or professional life.   ADL TOTAL          Related Medications           Gabapentin 300mg 2 2 2 3       Medications:  Current Outpatient Medications   Medication Sig Dispense Refill     acetaminophen (TYLENOL) 500 MG tablet Take 500-1,000 mg by mouth every 8  hours as needed for mild pain       Carboxymethylcellulose Sodium 1 % GEL Apply 1 drop to eye nightly as needed       cyproheptadine (PERIACTIN) 4 MG tablet Take 12 mg by mouth At Bedtime       finasteride (PROSCAR) 5 MG tablet Take 1 tablet by mouth daily       folic acid (FOLVITE) 1 MG tablet Take 1 tablet by mouth daily       gabapentin (NEURONTIN) 300 MG capsule Patient reports taking 2 x 300 mg tid + 3 x 300 mg at bedtime ([facility] .10.18)    Takes 300 mg capsule and 600 mg tablet (see other order as well)   Dosing as follows:    AM:  300 mg + 600 mg = 900 mg total  Afternoon: 300 mg + 600 mg = 900 mg total  Evenin mg x2 + 600 mg = 1200 mg total  Bedtime: 300 mg x3 + 600 mg = 1200 mg total       mirtazapine (REMERON) 30 MG tablet Take 15 mg by mouth daily       NONFORMULARY Nocturnal CPAP with 2L oxygen       pantoprazole (PROTONIX) 40 MG EC tablet Take 40 mg by mouth 2 times daily       polyethylene glycol (MIRALAX) 17 g packet Take 17 g by mouth daily 30 packet 0     prazosin (MINIPRESS) 5 MG capsule Take 10 mg by mouth At Bedtime       propylene glycol (SYSTANE BALANCE) 0.6 % SOLN ophthalmic solution Apply 1 drop to eye 4 times daily as needed       SENNA-docusate sodium (SENNA S) 8.6-50 MG tablet Take 2 tablets by mouth 2 times daily 60 tablet 0     sertraline (ZOLOFT) 100 MG tablet Take 150 mg by mouth daily       simvastatin (ZOCOR) 40 MG tablet Take 0.5 tablets by mouth At Bedtime       tamsulosin (FLOMAX) 0.4 MG capsule Take 1 capsule by mouth daily       triamcinolone (KENALOG) 0.1 % external cream Apply 1 Film topically daily as needed       cholecalciferol 50 MCG ( UT) tablet TAKE ONE TABLET BY MOUTH DAILY FOR VITAMIN D SUPPLEMENT (Patient not taking: Reported on 10/18/2022)       gabapentin (NEURONTIN) 600 MG tablet Takes 300 mg capsule and 600 mg tablet (see other order as well)   Dosing as follows:    AM:  300 mg + 600 mg = 900 mg total  Afternoon: 300 mg + 600 mg = 900 mg total  Evening:  300 mg x2 + 600 mg = 1200 mg total  Bedtime: 300 mg x3 + 600 mg = 1200 mg total (Patient not taking: Reported on 10/18/2022)       oxyCODONE (ROXICODONE) 5 MG tablet Take 1 tablet (5 mg) by mouth every 6 hours as needed for pain (Patient not taking: Reported on 10/18/2022) 12 tablet 0     vitamin B-12 (CYANOCOBALAMIN) 1000 MCG tablet Take 1 tablet by mouth daily (Patient not taking: Reported on 10/18/2022)          Review of Systems:  Other than that noted at the end of this note, the remainder of 12 systems reviewed were negative.    Allergies: has No Known Allergies.    Past Medical History:  Past Medical History:   Diagnosis Date     Alcohol dependence (H)      Anemia      Anxiety      BPH (benign prostatic hyperplasia)      Cataract      Chronic pain      DVT (deep venous thrombosis) (H)      Dysphagia      Dysthymia      Erosive gastritis      Esophagitis      Follicular lymphoma (H)      Foot sprain      Gastroesophageal reflux disease with esophagitis      HTN (hypertension)      Hyperlipidemia      Impacted cerumen      MCI (mild cognitive impairment)      Osteoarthritis      Presbyopia      PTSD (post-traumatic stress disorder)      Sensorineural hearing loss, bilateral      Tinnitus, bilateral      Urinary frequency        Past Surgical History:  Past Surgical History:   Procedure Laterality Date     ANKLE SURGERY Right      BACK SURGERY      lower back, herniated disc     CYSTECTOMY      pilonidal     IMPLANT DEEP BRAIN STIMULATION GENERATOR / BATTERY Left 10/4/2022    Procedure: Deep brain stimulator placement, phase II, placement of deep brain stimulator generator/battery over the left chest wall;  Surgeon: Manuel Otero MD;  Location: [facility] OR     OPTICAL TRACKING SYSTEM INSERTION DEEP BRAIN STIMULATION Left 9/26/2022    Procedure: stealth assisted Left side deep brain stimulator placement, phase I, placement of left side deep brain stimulator electrode, target left ventral intermediate nucleus of  the thalamus, with microelectrode recording;  Surgeon: Manuel Otero MD;  Location: [facility] OR     VASECTOMY         Social History:  Social History     Socioeconomic History     Marital status:    Tobacco Use     Smoking status: Former     Smokeless tobacco: Never       Family History:  Family History   Problem Relation Age of Onset     Tremor Father          Physical Exam:  The patient's  blood pressure is 126/83 and his pulse is 66. His respiration is 16 and oxygen saturation is 97%.      Neurological Examination:   Motor (Performance) Sub-Scale 12/6/2022   Assessment Time 11:06 AM   Medication On   DBS - Right Brain On   DBS - Left Brain None   Head 0   Face & Jaw 0   Voice 0.5   Outstretched - RIGHT 1   Outstretched - LEFT 1   Wingbeating - RIGHT 0   Wingbeating - LEFT 1   Kinetic - RIGHT 1.5   Kinetic - LEFT 1.5   Lower Limb - RIGHT 0   Lower Limb - LEFT 0   Lower Limb (Max) 0   Spiral - RIGHT 4   Spiral - LEFT 2   Handwriting 4   Dot approx - RIGHT 2   Dot approx - LEFT 1.5   Trunk (Standing) 0   Total Right 8.5   Total Left 7   Axial 0.5   TOTAL 20   Prior: 18.5 on 10/24/2022        Procedure: DBS Interrogation & Programming  Lead(s):    Left   DBS Target VIM   DBS Lead Type Cartesia   Lead Implant Date 9/26/2022      IPG(s):    1   IPG Genus R16   IPG Implant Date 10/4/2022   Location Left chest   Battery (V) /     Impedance Check: No problems found  See scanned report for impedance details.    Program 1 Left Brain             Initial Final     Active Inactive   Amplitude (mA) 3.5 [0-3.5] 2.0 [0-2.5]   Pulse width (usec) 30 60   Freq (Hz) 149 204   Contacts: C+L3- C+L3-      Program 2 Left Brain             Initial Final     Inactive Inactive   Amplitude /mA) 3.0 [0-4.0] 3.0 [0-3.5]   Pulse width (usec) 30 60   Freq (Hz) 149 204   Contacts: C+L4- C+L4-     Program 3 Left Brain             Initial Final     Inactive Inactive   Amplitude /mA) 4.5 [0-4.5] 3.0 [0-3.0]   Pulse width (usec) 30 60   Freq  (Hz) 170 204   Contacts: C+L3L4-  (50/50) C+L3L4- (50/50)     Program 4 Left Brain             Initial Final     Inactive Active   Amplitude /mA) 4.0 [0-4.0] 5.0 [0-5.0]  3.5 [0-35]   Pulse width (usec) 30 40   Freq (Hz) 170 139  116   Contacts: C+L3L4-  (50/50) C+L4-  C+L3-         L VIM:  Contacts Amplitude PW Frequency Effect SE   C+L3- 1.0 30 204 NC NN    2.0   Spiral 2 NN    3.0   NC NN    4.0        4.5    RUE tight    1.0 60 204 Spiral 2.5 NN   *best 2.0   Spiral 1 NN    2.5   Handwriting poor NN    3.0   Spiral worse Right side tightness   C+L4- 1.0 30 204 Spiral 2.5 NN    2.0   Spiral 1 NN    3.0   Spiral 2 NN    4.0   Spiral 1 NN    5.0    Right leg tight    4.5    resolved    1.0 60 204 Spiral 2 NN    2.0   Spiral 1.5 NN   *best 3.0   Spiral 1 NN    4.0      Right side tightening    3.5    Resolved   C+L3/L4 (50/50) 1.0 60 204 Spiral 1 NN    2.0   Spiral 1.5 NN   *best 3.0   Spiral 0.5 NN    3.5   R face pulling Right tightness   C+L4-  C+L3- 3.5  3.0 60 130  112 Spiral 2 NN    4.0  3.0 50 130  116 Spiral 1 NN    5.0  3.5 40 130  116  NN   *best best 5.0  3.5 40  40 139  116  NN           Assessment/Plan:  Arley Butt is a 72 year old male with ET s/p left VIM DBS who returns for DBS optimization.  He didn't notice any improved tremor control from baseline on program 3 or 1.  Today I experimented with increasing the frequency and pulse width on his existing programs which led to improved tremor control.  Program 4 was created with double monopolar and gave the best improvement.  In order of spiral improvement 4>3>2>1.    - Went home on program 4  - Follow-up scheduled for more programming in 2 weeks        Kaylynn Plummer MD    Movement Disorders Division  Department of Neurology       17 minutes spent on the date of the encounter doing chart review, history and exam, documentation and further activities as noted above.     Additional time spent for separate DBS programmin  min DBS analyzed with reprogramming.

## 2022-12-06 ENCOUNTER — OFFICE VISIT (OUTPATIENT)
Dept: NEUROLOGY | Facility: CLINIC | Age: 72
End: 2022-12-06
Payer: COMMERCIAL

## 2022-12-06 VITALS
HEART RATE: 66 BPM | OXYGEN SATURATION: 97 % | SYSTOLIC BLOOD PRESSURE: 126 MMHG | DIASTOLIC BLOOD PRESSURE: 83 MMHG | RESPIRATION RATE: 16 BRPM

## 2022-12-06 DIAGNOSIS — G25.0 ESSENTIAL TREMOR: Primary | ICD-10-CM

## 2022-12-06 DIAGNOSIS — Z96.89 S/P DEEP BRAIN STIMULATOR PLACEMENT: ICD-10-CM

## 2022-12-06 PROCEDURE — 95983 ALYS BRN NPGT PRGRMG 15 MIN: CPT | Performed by: STUDENT IN AN ORGANIZED HEALTH CARE EDUCATION/TRAINING PROGRAM

## 2022-12-06 PROCEDURE — 95984 ALYS BRN NPGT PRGRMG ADDL 15: CPT | Performed by: STUDENT IN AN ORGANIZED HEALTH CARE EDUCATION/TRAINING PROGRAM

## 2022-12-06 PROCEDURE — 99212 OFFICE O/P EST SF 10 MIN: CPT | Mod: 25 | Performed by: STUDENT IN AN ORGANIZED HEALTH CARE EDUCATION/TRAINING PROGRAM

## 2022-12-06 ASSESSMENT — ACTIVITIES OF DAILY LIVING (ADL)
OVERALL_DISABILITY_WITH_THE_MOST_AFFECTED_TASK: (4) SEVERELY ABNORMAL. CANNOT DO THE TASK.
OVERALL_DISABILITY_WITH_THE_MOST_AFFECTED_TASK: WRITING
SPEAKING: (0) NORMAL
WRITING: (4) SEVERELY ABNORMAL. CANNOT WRITE.
HYGIENE: (0) NORMAL
DRINKING_FROM_A_GLASS: (3) MODERATELY ABNORMAL. SPILLS A LOT OR CHANGES STRATEGY TO COMPLETE TASK SUCH AS USING TWO HANDS OR LEANING OVER.
CARRYING_FOOD_TRAYS_PLATES_OR_SIMILAR_ITEMS: (0) NORMAL
DRESS: (2) MILDLY ABNORMAL. ABLE TO DO EVERYTHING BUT HAS DIFFICULTY DUE TO TREMOR.
FEEDING_WITH_A_SPOON: (3) MODERATELY ABNORMAL. SPILLS A LOT OR CHANGES STRATEGY TO COMPLETE TASK SUCH AS USING TWO HANDS OR LEANING OVER.
POURING: (3) MODERATELY ABNORMAL. MUST USE TWO HANDS OR USES OTHER STRATEGIES TO AVOID SPILLING.
SOCIAL_IMPACT: (1) AWARE OF TREMOR, BUT IT DOES NOT AFFECT LIFESTYLE OR PROFESSIONAL LIFE.
WORKING: (0) NORMAL

## 2022-12-06 ASSESSMENT — PAIN SCALES - GENERAL: PAINLEVEL: NO PAIN (0)

## 2022-12-06 NOTE — LETTER
2022       RE: Arley Butt  [address] [facility] [address]     Dear Colleague,    Thank you for referring your patient, Arley Butt, to the [facility] [place] [facility]. Please see a copy of my visit note below.    Department of Neurology  Movement Disorders Division   DBS Follow-up Note    Patient: Arley Butt  MRN: [id number]   : 1950   Date of Visit: 2022    Diagnosis: Essential tremor  DBS Target(s): Left VIM  Date(s) of DBS Lead Placement: 2022  Date(s) of IPG Placement: L chest 10/4/2022  Device: [employer]      Chief Complaint:  Arley Butt is a 72 year old male who returns to clinic for follow up of ET status post left VIM DBS.   He was last seen 10/24/2022 at which time he underwent L VIM initial programming    Interval History:  He thinks that his tremor is worse if anything.  He has increased the stimulation.  No adverse effects.  Didn't notice any difference between program 3 and 1.       Tremor ADL Scale  1. Speaking 0 (0) Normal   2. Feeding with a spoon 3 (3) Moderately abnormal. Spills a lot or changes strategy to complete task such as using two hands or leaning over.   3. Drinking from a glass 3 (3) Moderately abnormal. Spills a lot or changes strategy to complete task such as using two hands or leaning over.   4. Hygiene 0 (0) Normal   5. Dressing 2 (zippers) (2) Mildly abnormal. Able to do everything but has difficulty due to tremor. (zippers)   6. Pouring 3 (3) Moderately abnormal. Must use two hands or uses other strategies to avoid spilling.   7. Carrying food trays, plates or similar items 0 (0) Normal   8. Using keys       9. Writing 4 (4) Severely abnormal. Cannot write.   10. Working 0 (0) Normal   11. Overall disability with the most affected task 4 (4) Severely abnormal. Cannot do the task.   Name of most affected task Writing Writing   12. Social impact 1 (1) Aware  of tremor, but it does not affect lifestyle or professional life.   ADL TOTAL          Related Medications           Gabapentin 300mg 2 2 2 3       Medications:  Current Outpatient Medications   Medication Sig Dispense Refill     acetaminophen (TYLENOL) 500 MG tablet Take 500-1,000 mg by mouth every 8 hours as needed for mild pain       Carboxymethylcellulose Sodium 1 % GEL Apply 1 drop to eye nightly as needed       cyproheptadine (PERIACTIN) 4 MG tablet Take 12 mg by mouth At Bedtime       finasteride (PROSCAR) 5 MG tablet Take 1 tablet by mouth daily       folic acid (FOLVITE) 1 MG tablet Take 1 tablet by mouth daily       gabapentin (NEURONTIN) 300 MG capsule Patient reports taking 2 x 300 mg tid + 3 x 300 mg at bedtime ([facility] [place] [facility] 2.10.18)    Takes 300 mg capsule and 600 mg tablet (see other order as well)   Dosing as follows:    AM:  300 mg + 600 mg = 900 mg total  Afternoon: 300 mg + 600 mg = 900 mg total  Evenin mg x2 + 600 mg = 1200 mg total  Bedtime: 300 mg x3 + 600 mg = 1200 mg total       mirtazapine (REMERON) 30 MG tablet Take 15 mg by mouth daily       NONFORMULARY Nocturnal CPAP with 2L oxygen       pantoprazole (PROTONIX) 40 MG EC tablet Take 40 mg by mouth 2 times daily       polyethylene glycol (MIRALAX) 17 g packet Take 17 g by mouth daily 30 packet 0     prazosin (MINIPRESS) 5 MG capsule Take 10 mg by mouth At Bedtime       propylene glycol (SYSTANE BALANCE) 0.6 % SOLN ophthalmic solution Apply 1 drop to eye 4 times daily as needed       SENNA-docusate sodium (SENNA S) 8.6-50 MG tablet Take 2 tablets by mouth 2 times daily 60 tablet 0     sertraline (ZOLOFT) 100 MG tablet Take 150 mg by mouth daily       simvastatin (ZOCOR) 40 MG tablet Take 0.5 tablets by mouth At Bedtime       tamsulosin (FLOMAX) 0.4 MG capsule Take 1 capsule by mouth daily       triamcinolone (KENALOG) 0.1 % external cream Apply 1 Film topically daily as needed       cholecalciferol 50 MCG (2000) tablet TAKE ONE TABLET BY  MOUTH DAILY FOR VITAMIN D SUPPLEMENT (Patient not taking: Reported on 10/18/2022)       gabapentin (NEURONTIN) 600 MG tablet Takes 300 mg capsule and 600 mg tablet (see other order as well)   Dosing as follows:    AM:  300 mg + 600 mg = 900 mg total  Afternoon: 300 mg + 600 mg = 900 mg total  Evenin mg x2 + 600 mg = 1200 mg total  Bedtime: 300 mg x3 + 600 mg = 1200 mg total (Patient not taking: Reported on 10/18/2022)       oxyCODONE (ROXICODONE) 5 MG tablet Take 1 tablet (5 mg) by mouth every 6 hours as needed for pain (Patient not taking: Reported on 10/18/2022) 12 tablet 0     vitamin B-12 (CYANOCOBALAMIN) 1000 MCG tablet Take 1 tablet by mouth daily (Patient not taking: Reported on 10/18/2022)          Review of Systems:  Other than that noted at the end of this note, the remainder of 12 systems reviewed were negative.    Allergies: has No Known Allergies.    Past Medical History:  Past Medical History:   Diagnosis Date     Alcohol dependence (H)      Anemia      Anxiety      BPH (benign prostatic hyperplasia)      Cataract      Chronic pain      DVT (deep venous thrombosis) (H)      Dysphagia      Dysthymia      Erosive gastritis      Esophagitis      Follicular lymphoma (H)      Foot sprain      Gastroesophageal reflux disease with esophagitis      HTN (hypertension)      Hyperlipidemia      Impacted cerumen      MCI (mild cognitive impairment)      Osteoarthritis      Presbyopia      PTSD (post-traumatic stress disorder)      Sensorineural hearing loss, bilateral      Tinnitus, bilateral      Urinary frequency        Past Surgical History:  Past Surgical History:   Procedure Laterality Date     ANKLE SURGERY Right      BACK SURGERY      lower back, herniated disc     CYSTECTOMY      pilonidal     IMPLANT DEEP BRAIN STIMULATION GENERATOR / BATTERY Left 10/4/2022    Procedure: Deep brain stimulator placement, phase II, placement of deep brain stimulator generator/battery over the left chest wall;   Surgeon: Manuel Otero MD;  Location: [facility] OR     OPTICAL TRACKING SYSTEM INSERTION DEEP BRAIN STIMULATION Left 9/26/2022    Procedure: stealth assisted Left side deep brain stimulator placement, phase I, placement of left side deep brain stimulator electrode, target left ventral intermediate nucleus of the thalamus, with microelectrode recording;  Surgeon: Manuel Otero MD;  Location: [facility] OR     VASECTOMY         Social History:  Social History     Socioeconomic History     Marital status:    Tobacco Use     Smoking status: Former     Smokeless tobacco: Never       Family History:  Family History   Problem Relation Age of Onset     Tremor Father          Physical Exam:  The patient's  blood pressure is 126/83 and his pulse is 66. His respiration is 16 and oxygen saturation is 97%.      Neurological Examination:   Motor (Performance) Sub-Scale 12/6/2022   Assessment Time 11:06 AM   Medication On   DBS - Right Brain On   DBS - Left Brain None   Head 0   Face & Jaw 0   Voice 0.5   Outstretched - RIGHT 1   Outstretched - LEFT 1   Wingbeating - RIGHT 0   Wingbeating - LEFT 1   Kinetic - RIGHT 1.5   Kinetic - LEFT 1.5   Lower Limb - RIGHT 0   Lower Limb - LEFT 0   Lower Limb (Max) 0   Spiral - RIGHT 4   Spiral - LEFT 2   Handwriting 4   Dot approx - RIGHT 2   Dot approx - LEFT 1.5   Trunk (Standing) 0   Total Right 8.5   Total Left 7   Axial 0.5   TOTAL 20   Prior: 18.5 on 10/24/2022        Procedure: DBS Interrogation & Programming  Lead(s):    Left   DBS Target VIM   DBS Lead Type Cartesia   Lead Implant Date 9/26/2022      IPG(s):    1   IPG Genus R16   IPG Implant Date 10/4/2022   Location Left chest   Battery (V) /     Impedance Check: No problems found  See scanned report for impedance details.    Program 1 Left Brain             Initial Final     Active Inactive   Amplitude (mA) 3.5 [0-3.5] 2.0 [0-2.5]   Pulse width (usec) 30 60   Freq (Hz) 149 204   Contacts: C+L3- C+L3-      Program 2 Left Brain              Initial Final     Inactive Inactive   Amplitude /mA) 3.0 [0-4.0] 3.0 [0-3.5]   Pulse width (usec) 30 60   Freq (Hz) 149 204   Contacts: C+L4- C+L4-     Program 3 Left Brain             Initial Final     Inactive Inactive   Amplitude /mA) 4.5 [0-4.5] 3.0 [0-3.0]   Pulse width (usec) 30 60   Freq (Hz) 170 204   Contacts: C+L3L4-  (50/50) C+L3L4- (50/50)     Program 4 Left Brain             Initial Final     Inactive Active   Amplitude /mA) 4.0 [0-4.0] 5.0 [0-5.0]  3.5 [0-35]   Pulse width (usec) 30 40   Freq (Hz) 170 139  116   Contacts: C+L3L4-  (50/50) C+L4-  C+L3-         L VIM:  Contacts Amplitude PW Frequency Effect SE   C+L3- 1.0 30 204 NC NN    2.0   Spiral 2 NN    3.0   NC NN    4.0        4.5    RUE tight    1.0 60 204 Spiral 2.5 NN   *best 2.0   Spiral 1 NN    2.5   Handwriting poor NN    3.0   Spiral worse Right side tightness   C+L4- 1.0 30 204 Spiral 2.5 NN    2.0   Spiral 1 NN    3.0   Spiral 2 NN    4.0   Spiral 1 NN    5.0    Right leg tight    4.5    resolved    1.0 60 204 Spiral 2 NN    2.0   Spiral 1.5 NN   *best 3.0   Spiral 1 NN    4.0      Right side tightening    3.5    Resolved   C+L3/L4 (50/50) 1.0 60 204 Spiral 1 NN    2.0   Spiral 1.5 NN   *best 3.0   Spiral 0.5 NN    3.5   R face pulling Right tightness   C+L4-  C+L3- 3.5  3.0 60 130  112 Spiral 2 NN    4.0  3.0 50 130  116 Spiral 1 NN    5.0  3.5 40 130  116  NN   *best best 5.0  3.5 40  40 139  116  NN       Assessment/Plan:  Arley Butt is a 72 year old male with ET s/p left VIM DBS who returns for DBS optimization.  He didn't notice any improved tremor control from baseline on program 3 or 1.  Today I experimented with increasing the frequency and pulse width on his existing programs which led to improved tremor control.  Program 4 was created with double monopolar and gave the best improvement.  In order of spiral improvement 4>3>2>1.    - Went home on program 4  - Follow-up scheduled for more programming in 2  weeks      17 minutes spent on the date of the encounter doing chart review, history and exam, documentation and further activities as noted above.     Additional time spent for separate DBS programmin min DBS analyzed with reprogramming.        Again, thank you for allowing me to participate in the care of your patient.      Sincerely,    Kaylynn Plummer MD

## 2022-12-13 ENCOUNTER — TELEPHONE (OUTPATIENT)
Dept: NEUROLOGY | Facility: CLINIC | Age: 72
End: 2022-12-13

## 2022-12-13 ENCOUNTER — OFFICE VISIT (OUTPATIENT)
Dept: NEUROSURGERY | Facility: CLINIC | Age: 72
End: 2022-12-13
Payer: COMMERCIAL

## 2022-12-13 VITALS
OXYGEN SATURATION: 95 % | HEART RATE: 69 BPM | SYSTOLIC BLOOD PRESSURE: 161 MMHG | BODY MASS INDEX: 32.93 KG/M2 | WEIGHT: 204 LBS | RESPIRATION RATE: 16 BRPM | DIASTOLIC BLOOD PRESSURE: 89 MMHG

## 2022-12-13 DIAGNOSIS — G25.0 ESSENTIAL TREMOR: Primary | ICD-10-CM

## 2022-12-13 DIAGNOSIS — Z96.89 S/P DEEP BRAIN STIMULATOR PLACEMENT: ICD-10-CM

## 2022-12-13 PROCEDURE — 99024 POSTOP FOLLOW-UP VISIT: CPT | Performed by: NEUROLOGICAL SURGERY

## 2022-12-13 ASSESSMENT — PAIN SCALES - GENERAL: PAINLEVEL: NO PAIN (0)

## 2022-12-13 NOTE — TELEPHONE ENCOUNTER
Svitlana, the patient's daughter, and the patient, called to inform the writer that they saw Dr. Otero today and was told to reach out to the writer to start the 2nd side work-up. Informed Svitlana and the patient that they called the right person. Informed them that the writer will notify Dr. Plummer and see what orders are needed. The writer will verify with Dr. Otero if he needs to see them again but it sounds like maybe not since he talked to them today.     Svitlana did ask to set up the next programming appointment and the writer did go ahead and set up the rating scale as well too.     Svitlana stated she also spoke to Dr. Otero about having an ID bracelet for the patient regarding his Deep Brain Stimulation system. Informed Svitlana that this can be discussed further.    Informed Svitlana and the patient that the writer will back to them if needed, about the work-up.

## 2022-12-13 NOTE — TELEPHONE ENCOUNTER
"Spoke to the patient's daughter, Svitlana, and informed her the neuropsychological evaluation is needed as well as the motor testing. The patient was scheduled for a neuropsychological evaluation on 1/12 at 8 AM. Svitlana was asked if the patient would like to sign up for [employer]. Svitlana stated the patient would like to sign up for [employer]. Will sign the patient up sooner to his work-up appointments in January.    Informed Svitlana of this message from [employer]:    \"There are no bracelets for Deep Brain Stimulation. The manufacturers do not provide nor do we. They need to keep their medical device card in their wallet at all times.\"     Svitlana thanked the writer for her time and had no further questions.  "

## 2022-12-13 NOTE — PROGRESS NOTES
Neurosurgery Attending DBS Follow-Up Note     HPI: Arley Butt is a 71 y/o gentleman s/p left ViM DBS (BSci Genus R16 with two extensions) almost three months ago who presents for f/u. Doing well so far. Still working with Dr. Plummer on his tremor programming as it's better, but not where he hoped it would be yet. Is interested in second side surgery and will need neuropsych testing follow up at 6 months prior to scheduling. He and his daughter are going to follow up with Yusuf about this. He and his daughter had some standard postoperative questions about scalp numbness, haircuts and second side workup which I answered to the best of my ability.     Exam:  Awake, alert, oriented x 3  Attends, follows, fluent  PERRL  EOMI  FS  TML  BUE/BLE 5/5, no drift  Wounds all c/d/i, no erythema, swelling or tenderness     Programming schedule: next week with Dr. Plummer     UPDRS/TETRAS outcome: TBD     A/P: 71 y/o gentleman with ET s/p left ViM DBS, doing well.     - RTC prn  - Programming per movement disorders team  - No physical activity restrictions

## 2022-12-13 NOTE — LETTER
12/13/2022       RE: Arley Butt  [address] [facility] [address]     Dear Colleague,    Thank you for referring your patient, Arley Butt, to the [facility]UROSURGERY [facility]INIC [facility]. Please see a copy of my visit note below.    Neurosurgery Attending DBS Follow-Up Note     HPI: Arley Butt is a 73 y/o gentleman s/p left ViM DBS (BSci Genus R16 with two extensions) almost three months ago who presents for f/u. Doing well so far. Still working with Dr. Plummer on his tremor programming as it's better, but not where he hoped it would be yet. Is interested in second side surgery and will need neuropsych testing follow up at 6 months prior to scheduling. He and his daughter are going to follow up with Yusuf about this. He and his daughter had some standard postoperative questions about scalp numbness, haircuts and second side workup which I answered to the best of my ability.     Exam:  Awake, alert, oriented x 3  Attends, follows, fluent  PERRL  EOMI  FS  TML  BUE/BLE 5/5, no drift  Wounds all c/d/i, no erythema, swelling or tenderness     Programming schedule: next week with Dr. Plummer     UPDRS/TETRAS outcome: TBD     A/P: 73 y/o gentleman with ET s/p left ViM DBS, doing well.     - RTC prn  - Programming per movement disorders team  - No physical activity restrictions      Again, thank you for allowing me to participate in the care of your patient.      Sincerely,    Manuel Otero MD

## 2022-12-16 ENCOUNTER — MYC MEDICAL ADVICE (OUTPATIENT)
Dept: NEUROLOGY | Facility: CLINIC | Age: 72
End: 2022-12-16

## 2022-12-18 NOTE — PROGRESS NOTES
Department of Neurology  Movement Disorders Division   DBS Follow-up Note    Patient: Arley Butt  MRN: [id number]   : 1950   Date of Visit: 2022    Diagnosis: Essential tremor  DBS Target(s): Left VIM  Date(s) of DBS Lead Placement: 2022  Date(s) of IPG Placement: L chest 10/4/2022  Device: [employer]      Chief Complaint:  Arley Butt is a 72 year old male who returns to clinic for follow up of ET status post left VIM DBS.  He was last seen on 2022 at which time 4 new programs were made for better tremor control.    Interval History:  He hasn't noticed any changes.  Although he brought a sample of his writing that is much improved.      Related Medications           Gabapentin 300mg 2 2 2 3         Medications:  Current Outpatient Medications   Medication Sig Dispense Refill     acetaminophen (TYLENOL) 500 MG tablet Take 500-1,000 mg by mouth every 8 hours as needed for mild pain       Carboxymethylcellulose Sodium 1 % GEL Apply 1 drop to eye nightly as needed       cholecalciferol 50 MCG (2000) tablet TAKE ONE TABLET BY MOUTH DAILY FOR VITAMIN D SUPPLEMENT       cyproheptadine (PERIACTIN) 4 MG tablet Take 12 mg by mouth At Bedtime       finasteride (PROSCAR) 5 MG tablet Take 1 tablet by mouth daily       folic acid (FOLVITE) 1 MG tablet Take 1 tablet by mouth daily       gabapentin (NEURONTIN) 300 MG capsule Patient reports taking 2 x 300 mg tid + 3 x 300 mg at bedtime ([facility] .10.18)    Takes 300 mg capsule and 600 mg tablet (see other order as well)   Dosing as follows:    AM:  300 mg + 600 mg = 900 mg total  Afternoon: 300 mg + 600 mg = 900 mg total  Evenin mg x2 + 600 mg = 1200 mg total  Bedtime: 300 mg x3 + 600 mg = 1200 mg total       mirtazapine (REMERON) 30 MG tablet Take 15 mg by mouth daily       NONFORMULARY Nocturnal CPAP with 2L oxygen       pantoprazole (PROTONIX) 40 MG EC tablet Take 40 mg by mouth 2 times daily       polyethylene glycol (MIRALAX)  17 g packet Take 17 g by mouth daily 30 packet 0     prazosin (MINIPRESS) 5 MG capsule Take 10 mg by mouth At Bedtime       propylene glycol (SYSTANE BALANCE) 0.6 % SOLN ophthalmic solution Apply 1 drop to eye 4 times daily as needed       SENNA-docusate sodium (SENNA S) 8.6-50 MG tablet Take 2 tablets by mouth 2 times daily 60 tablet 0     sertraline (ZOLOFT) 100 MG tablet Take 150 mg by mouth daily       simvastatin (ZOCOR) 40 MG tablet Take 0.5 tablets by mouth At Bedtime       tamsulosin (FLOMAX) 0.4 MG capsule Take 1 capsule by mouth daily       triamcinolone (KENALOG) 0.1 % external cream Apply 1 Film topically daily as needed       vitamin B-12 (CYANOCOBALAMIN) 1000 MCG tablet Take 1 tablet by mouth daily       gabapentin (NEURONTIN) 600 MG tablet Takes 300 mg capsule and 600 mg tablet (see other order as well)   Dosing as follows:    AM:  300 mg + 600 mg = 900 mg total  Afternoon: 300 mg + 600 mg = 900 mg total  Evenin mg x2 + 600 mg = 1200 mg total  Bedtime: 300 mg x3 + 600 mg = 1200 mg total (Patient not taking: Reported on 2022)       oxyCODONE (ROXICODONE) 5 MG tablet Take 1 tablet (5 mg) by mouth every 6 hours as needed for pain (Patient not taking: Reported on 10/18/2022) 12 tablet 0        Review of Systems:  Other than that noted at the end of this note, the remainder of 12 systems reviewed were negative.    Allergies: has No Known Allergies.    Past Medical History:  Past Medical History:   Diagnosis Date     Alcohol dependence (H)      Anemia      Anxiety      BPH (benign prostatic hyperplasia)      Cataract      Chronic pain      DVT (deep venous thrombosis) (H)      Dysphagia      Dysthymia      Erosive gastritis      Esophagitis      Follicular lymphoma (H)      Foot sprain      Gastroesophageal reflux disease with esophagitis      HTN (hypertension)      Hyperlipidemia      Impacted cerumen      MCI (mild cognitive impairment)      Osteoarthritis      Presbyopia      PTSD  (post-traumatic stress disorder)      Sensorineural hearing loss, bilateral      Tinnitus, bilateral      Urinary frequency        Past Surgical History:  Past Surgical History:   Procedure Laterality Date     ANKLE SURGERY Right      BACK SURGERY      lower back, herniated disc     CYSTECTOMY      pilonidal     IMPLANT DEEP BRAIN STIMULATION GENERATOR / BATTERY Left 10/4/2022    Procedure: Deep brain stimulator placement, phase II, placement of deep brain stimulator generator/battery over the left chest wall;  Surgeon: Manuel Otero MD;  Location: [facility] OR     OPTICAL TRACKING SYSTEM INSERTION DEEP BRAIN STIMULATION Left 9/26/2022    Procedure: stealth assisted Left side deep brain stimulator placement, phase I, placement of left side deep brain stimulator electrode, target left ventral intermediate nucleus of the thalamus, with microelectrode recording;  Surgeon: Manuel Otero MD;  Location: [facility] OR     VASECTOMY         Social History:  Social History     Socioeconomic History     Marital status:    Tobacco Use     Smoking status: Former     Smokeless tobacco: Never       Family History:  Family History   Problem Relation Age of Onset     Tremor Father          Physical Exam:  The patient's  weight is 93.6 kg (206 lb 4.8 oz). His blood pressure is 158/97 (abnormal) and his pulse is 63. His oxygen saturation is 97%.       Neurological Examination:   Motor (Performance) Sub-Scale 12/19/2022   Assessment Time 10:06 AM   Medication On   DBS - Right Brain None   DBS - Left Brain On   Head 0   Face & Jaw 0   Voice 0   Outstretched - RIGHT 0.5   Outstretched - LEFT 0.5   Wingbeating - RIGHT 0   Wingbeating - LEFT 0.5   Kinetic - RIGHT 0.5   Kinetic - LEFT 1.5   Lower Limb - RIGHT 0   Lower Limb - LEFT 0   Lower Limb (Max) 0   Spiral - RIGHT 1   Spiral - LEFT 2   Handwriting 4   Dot approx - RIGHT 1.5   Dot approx - LEFT 1   Trunk (Standing) 0   Total Right 3.5   Total Left 5.5   Axial 0   TOTAL 13   Prior:  20 on 12/6/2022        Procedure: DBS Interrogation & Programming  Lead(s):    Left   DBS Target VIM   DBS Lead Type Cartesia   Lead Implant Date 9/26/2022      IPG(s):    1   IPG Genus R16   IPG Implant Date 10/4/2022   Location Left chest   Battery (V) /     Impedance Check: No problems found  See scanned report for impedance details.    Program 1 Left Brain             Initial Final     Inactive Inactive   Amplitude (mA) 2.0 [0-2.5] 4.5 [0-4.5]  4.0 [0-4.0]   Pulse width (usec) 60 50   Freq (Hz) 204 149  104   Contacts: C+L3- C+L4-  C+L3-      Program 2 Left Brain             Initial Final     Inactive Inactive   Amplitude /mA) 3.0 [0-3.5] 3.0 [0-3.5]   Pulse width (usec) 60 60   Freq (Hz) 204 204   Contacts: C+L4- C+L4-     Program 3 Left Brain             Initial Final     Inactive Inactive   Amplitude /mA) 3.0 [0-3.0] 3.0 [0-3.0]   Pulse width (usec) 60 60   Freq (Hz) 204 204   Contacts: C+L3L4-  (50/50) C+L3L4- (50/50)      Program 4 Left Brain             Initial Final     Active Active   Amplitude (mA) 5.0 [0-5.0]  3.5 [0-3.5] 6.5 [0-6.5]  4.5 [0-4.5]   Pulse width (usec) 40 40   Freq (Hz) 139  113 139  116   Contacts: C+L4-  C+L3- C+L4-  C+L3-       L VIM:  Contacts Amplitude PW Frequency Effect SE   C+L4-  C+L3- 5.5  3.5 40 139  116  NN    6.0  3.5   Spiral 1 NN    6.5  3.5    NN    7.0  3.5    Mouth tightness    6.5  3.5    resolved   C+L4-  C+L3- 6.0  4.0 40 139  116  NN    6.0  5.0    Mouth tight    6.0  4.5   Spiral 1, able to print Resolved   C+L4-  C+L3- 3.0  4.5 40 149  104 Spiral 1, print clear NN    4.0  4.5   Spiral 1, print slight tremor NN    5.0  4.5   Spiral 0.5, print clear NN    6.0  4.5    Right leg stiff    5.5  4.5   Gait unchanged Resolved   C+L4-  C+L3- 5.5  5.5 40 149  104 Spiral 0.5, print slight tremor NN    5.5  6.0    Mouth stiff, dysarthria   C+L4-  C+L3- 3.0  5.5 50  40 149  104  NN    4.0  5.5   Spiral 1, print slight tremor NN    5.0  5.5    Right leg and mouth stiff     4.5  5.5    resolved   C+L4-  C+L3- 4.5  4.0 50  50 149  104 Print clear, spiral 0.5, script improved NN    4.5  4.5    Arm stiff     When changing between programs, the tremor would improve.        Assessment/Plan:  Arley Butt is a 72 year old male with ET s/p left VIM DBS who returns for follow-up.  Despite his report that his tremor is not improved, objectively there has been significant improvement.  I think the discrepancy is that his cursive writing remains poor.  I shared that we may not get much further benefit in this specific aspect.  Today I further increased the current on Program 4 which resulted in some further improvement in his spirals and his printing became more clear.  I also created Program 1 with increased dorsal frequency as a back up.    - Remain on program 4 and report on tremor in 2 weeks  - RTC 3 months, 1 hr programming      Kaylynn Plummer MD   of Neurology  Movement Disorders Division       18 minutes spent on the date of the encounter doing chart review, history and exam, documentation and further activities as noted above.     Additional time spent for separate DBS programmin min DBS analyzed with reprogramming.

## 2022-12-19 ENCOUNTER — OFFICE VISIT (OUTPATIENT)
Dept: NEUROLOGY | Facility: CLINIC | Age: 72
End: 2022-12-19
Payer: COMMERCIAL

## 2022-12-19 VITALS
BODY MASS INDEX: 33.3 KG/M2 | OXYGEN SATURATION: 97 % | WEIGHT: 206.3 LBS | SYSTOLIC BLOOD PRESSURE: 158 MMHG | DIASTOLIC BLOOD PRESSURE: 97 MMHG | HEART RATE: 63 BPM

## 2022-12-19 DIAGNOSIS — G25.0 ESSENTIAL TREMOR: Primary | ICD-10-CM

## 2022-12-19 DIAGNOSIS — Z96.89 S/P DEEP BRAIN STIMULATOR PLACEMENT: ICD-10-CM

## 2022-12-19 PROCEDURE — 99212 OFFICE O/P EST SF 10 MIN: CPT | Mod: 25 | Performed by: STUDENT IN AN ORGANIZED HEALTH CARE EDUCATION/TRAINING PROGRAM

## 2022-12-19 PROCEDURE — 95983 ALYS BRN NPGT PRGRMG 15 MIN: CPT | Performed by: STUDENT IN AN ORGANIZED HEALTH CARE EDUCATION/TRAINING PROGRAM

## 2022-12-19 PROCEDURE — 95984 ALYS BRN NPGT PRGRMG ADDL 15: CPT | Performed by: STUDENT IN AN ORGANIZED HEALTH CARE EDUCATION/TRAINING PROGRAM

## 2022-12-19 ASSESSMENT — PAIN SCALES - GENERAL: PAINLEVEL: NO PAIN (0)

## 2022-12-19 NOTE — PATIENT INSTRUCTIONS
You are on Program 4 at 6.5 and 4.5.  Stay on this for 2 weeks and then report back on your tremor.    I also changed Program 1.  This is our backup.

## 2022-12-19 NOTE — LETTER
2022       RE: Arley Butt  [address] [facility] [address]     Dear Colleague,    Thank you for referring your patient, Arley Butt, to the [facility] NEUROLOGY CLINIC [place] [facility]. Please see a copy of my visit note below.    Department of Neurology  Movement Disorders Division   DBS Follow-up Note    Patient: Arley Butt  MRN: [id number]   : 1950   Date of Visit: 2022    Diagnosis: Essential tremor  DBS Target(s): Left VIM  Date(s) of DBS Lead Placement: 2022  Date(s) of IPG Placement: L chest 10/4/2022  Device: [employer]      Chief Complaint:  Arley Butt is a 72 year old male who returns to clinic for follow up of ET status post left VIM DBS.  He was last seen on 2022 at which time 4 new programs were made for better tremor control.    Interval History:  He hasn't noticed any changes.  Although he brought a sample of his writing that is much improved.      Related Medications           Gabapentin 300mg 2 2 2 3         Medications:  Current Outpatient Medications   Medication Sig Dispense Refill     acetaminophen (TYLENOL) 500 MG tablet Take 500-1,000 mg by mouth every 8 hours as needed for mild pain       Carboxymethylcellulose Sodium 1 % GEL Apply 1 drop to eye nightly as needed       cholecalciferol 50 MCG ( [place]) tablet TAKE ONE TABLET BY MOUTH DAILY FOR VITAMIN D SUPPLEMENT       cyproheptadine (PERIACTIN) 4 MG tablet Take 12 mg by mouth At Bedtime       finasteride (PROSCAR) 5 MG tablet Take 1 tablet by mouth daily       folic acid (FOLVITE) 1 MG tablet Take 1 tablet by mouth daily       gabapentin (NEURONTIN) 300 MG capsule Patient reports taking 2 x 300 mg tid + 3 x 300 mg at bedtime ([facility] .10.18)    Takes 300 mg capsule and 600 mg tablet (see other order as well)   Dosing as follows:    AM:  300 mg + 600 mg = 900 mg total  Afternoon: 300 mg + 600 mg = 900 mg total  Evenin mg  x2 + 600 mg = 1200 mg total  Bedtime: 300 mg x3 + 600 mg = 1200 mg total       mirtazapine (REMERON) 30 MG tablet Take 15 mg by mouth daily       NONFORMULARY Nocturnal CPAP with 2L oxygen       pantoprazole (PROTONIX) 40 MG EC tablet Take 40 mg by mouth 2 times daily       polyethylene glycol (MIRALAX) 17 g packet Take 17 g by mouth daily 30 packet 0     prazosin (MINIPRESS) 5 MG capsule Take 10 mg by mouth At Bedtime       propylene glycol (SYSTANE BALANCE) 0.6 % SOLN ophthalmic solution Apply 1 drop to eye 4 times daily as needed       SENNA-docusate sodium (SENNA S) 8.6-50 MG tablet Take 2 tablets by mouth 2 times daily 60 tablet 0     sertraline (ZOLOFT) 100 MG tablet Take 150 mg by mouth daily       simvastatin (ZOCOR) 40 MG tablet Take 0.5 tablets by mouth At Bedtime       tamsulosin (FLOMAX) 0.4 MG capsule Take 1 capsule by mouth daily       triamcinolone (KENALOG) 0.1 % external cream Apply 1 Film topically daily as needed       vitamin B-12 (CYANOCOBALAMIN) 1000 MCG tablet Take 1 tablet by mouth daily       gabapentin (NEURONTIN) 600 MG tablet Takes 300 mg capsule and 600 mg tablet (see other order as well)   Dosing as follows:    AM:  300 mg + 600 mg = 900 mg total  Afternoon: 300 mg + 600 mg = 900 mg total  Evenin mg x2 + 600 mg = 1200 mg total  Bedtime: 300 mg x3 + 600 mg = 1200 mg total (Patient not taking: Reported on 2022)       oxyCODONE (ROXICODONE) 5 MG tablet Take 1 tablet (5 mg) by mouth every 6 hours as needed for pain (Patient not taking: Reported on 10/18/2022) 12 tablet 0        Review of Systems:  Other than that noted at the end of this note, the remainder of 12 systems reviewed were negative.    Allergies: has No Known Allergies.    Past Medical History:  Past Medical History:   Diagnosis Date     Alcohol dependence (H)      Anemia      Anxiety      BPH (benign prostatic hyperplasia)      Cataract      Chronic pain      DVT (deep venous thrombosis) (H)      Dysphagia       Dysthymia      Erosive gastritis      Esophagitis      Follicular lymphoma (H)      Foot sprain      Gastroesophageal reflux disease with esophagitis      HTN (hypertension)      Hyperlipidemia      Impacted cerumen      MCI (mild cognitive impairment)      Osteoarthritis      Presbyopia      PTSD (post-traumatic stress disorder)      Sensorineural hearing loss, bilateral      Tinnitus, bilateral      Urinary frequency        Past Surgical History:  Past Surgical History:   Procedure Laterality Date     ANKLE SURGERY Right      BACK SURGERY      lower back, herniated disc     CYSTECTOMY      pilonidal     IMPLANT DEEP BRAIN STIMULATION GENERATOR / BATTERY Left 10/4/2022    Procedure: Deep brain stimulator placement, phase II, placement of deep brain stimulator generator/battery over the left chest wall;  Surgeon: Manuel Otero MD;  Location: [facility] OR     OPTICAL TRACKING SYSTEM INSERTION DEEP BRAIN STIMULATION Left 9/26/2022    Procedure: stealth assisted Left side deep brain stimulator placement, phase I, placement of left side deep brain stimulator electrode, target left ventral intermediate nucleus of the thalamus, with microelectrode recording;  Surgeon: Manuel Otero MD;  Location: [facility] OR     VASECTOMY         Social History:  Social History     Socioeconomic History     Marital status:    Tobacco Use     Smoking status: Former     Smokeless tobacco: Never       Family History:  Family History   Problem Relation Age of Onset     Tremor Father          Physical Exam:  The patient's  weight is 93.6 kg (206 lb 4.8 oz). His blood pressure is 158/97 (abnormal) and his pulse is 63. His oxygen saturation is 97%.       Neurological Examination:   Motor (Performance) Sub-Scale 12/19/2022   Assessment Time 10:06 AM   Medication On   DBS - Right Brain None   DBS - Left Brain On   Head 0   Face & Jaw 0   Voice 0   Outstretched - RIGHT 0.5   Outstretched - LEFT 0.5   Wingbeating - RIGHT 0   Wingbeating - LEFT  0.5   Kinetic - RIGHT 0.5   Kinetic - LEFT 1.5   Lower Limb - RIGHT 0   Lower Limb - LEFT 0   Lower Limb (Max) 0   Spiral - RIGHT 1   Spiral - LEFT 2   Handwriting 4   Dot approx - RIGHT 1.5   Dot approx - LEFT 1   Trunk (Standing) 0   Total Right 3.5   Total Left 5.5   Axial 0   TOTAL 13   Prior: 20 on 12/6/2022        Procedure: DBS Interrogation & Programming  Lead(s):    Left   DBS Target VIM   DBS Lead Type Cartesia   Lead Implant Date 9/26/2022      IPG(s):    1   IPG Genus R16   IPG Implant Date 10/4/2022   Location Left chest   Battery (V) /     Impedance Check: No problems found  See scanned report for impedance details.    Program 1 Left Brain             Initial Final     Inactive Inactive   Amplitude (mA) 2.0 [0-2.5] 4.5 [0-4.5]  4.0 [0-4.0]   Pulse width (usec) 60 50   Freq (Hz) 204 149  104   Contacts: C+L3- C+L4-  C+L3-      Program 2 Left Brain             Initial Final     Inactive Inactive   Amplitude /mA) 3.0 [0-3.5] 3.0 [0-3.5]   Pulse width (usec) 60 60   Freq (Hz) 204 204   Contacts: C+L4- C+L4-     Program 3 Left Brain             Initial Final     Inactive Inactive   Amplitude /mA) 3.0 [0-3.0] 3.0 [0-3.0]   Pulse width (usec) 60 60   Freq (Hz) 204 204   Contacts: C+L3L4-  (50/50) C+L3L4- (50/50)      Program 4 Left Brain             Initial Final     Active Active   Amplitude (mA) 5.0 [0-5.0]  3.5 [0-3.5] 6.5 [0-6.5]  4.5 [0-4.5]   Pulse width (usec) 40 40   Freq (Hz) 139  113 139  116   Contacts: C+L4-  C+L3- C+L4-  C+L3-       L VIM:  Contacts Amplitude PW Frequency Effect SE   C+L4-  C+L3- 5.5  3.5 40 139  116  NN    6.0  3.5   Spiral 1 NN    6.5  3.5    NN    7.0  3.5    Mouth tightness    6.5  3.5    resolved   C+L4-  C+L3- 6.0  4.0 40 139  116  NN    6.0  5.0    Mouth tight    6.0  4.5   Spiral 1, able to print Resolved   C+L4-  C+L3- 3.0  4.5 40 149  104 Spiral 1, print clear NN    4.0  4.5   Spiral 1, print slight tremor NN    5.0  4.5   Spiral 0.5, print clear NN    6.0  4.5    Right  leg stiff    5.5  4.5   Gait unchanged Resolved   C+L4-  C+L3- 5.5  5.5 40 149  104 Spiral 0.5, print slight tremor NN    5.5  6.0    Mouth stiff, dysarthria   C+L4-  C+L3- 3.0  5.5 50  40 149  104  NN    4.0  5.5   Spiral 1, print slight tremor NN    5.0  5.5    Right leg and mouth stiff    4.5  5.5    resolved   C+L4-  C+L3- 4.5  4.0 50  50 149  104 Print clear, spiral 0.5, script improved NN    4.5  4.5    Arm stiff     When changing between programs, the tremor would improve.        Assessment/Plan:  Arley Butt is a 72 year old male with ET s/p left VIM DBS who returns for follow-up.  Despite his report that his tremor is not improved, objectively there has been significant improvement.  I think the discrepancy is that his cursive writing remains poor.  I shared that we may not get much further benefit in this specific aspect.  Today I further increased the current on Program 4 which resulted in some further improvement in his spirals and his printing became more clear.  I also created Program 1 with increased dorsal frequency as a back up.    - Remain on program 4 and report on tremor in 2 weeks  - RTC 3 months, 1 hr programming         18 minutes spent on the date of the encounter doing chart review, history and exam, documentation and further activities as noted above.     Additional time spent for separate DBS programmin min DBS analyzed with reprogramming.            Again, thank you for allowing me to participate in the care of your patient.      Sincerely,    Kaylynn Plummer MD

## 2022-12-21 NOTE — TELEPHONE ENCOUNTER
Called Svitlana to inform her of the [place] the writer sent to the patient that was not read yet. Svitlana stated she will reach out the [facility] to get an authorization for the neuropsychological evaluation scheduled on 1/12/23.    Svitlana had no further questions and thanked the writer for her call.

## 2022-12-23 ENCOUNTER — DOCUMENTATION ONLY (OUTPATIENT)
Dept: NEUROLOGY | Facility: CLINIC | Age: 72
End: 2022-12-23

## 2022-12-23 DIAGNOSIS — G25.0 ESSENTIAL TREMOR: Primary | ICD-10-CM

## 2023-01-11 ENCOUNTER — TELEPHONE (OUTPATIENT)
Dept: NEUROLOGY | Facility: CLINIC | Age: 73
End: 2023-01-11

## 2023-01-11 NOTE — TELEPHONE ENCOUNTER
Svitlana left the writer a detailed message asking to cancel today's appointment due to the icy roads.     Called Svitlana back and rescheduled the patient to 1/30 at 10 AM with Dr. Plummer. Svitlana stated she will be watching the weather for tomorrow and will call the writer back later today to see if she will reschedule tomorrow's neuropsychological evaluation with Dr. Cabrera.

## 2023-01-11 NOTE — TELEPHONE ENCOUNTER
"Spoke to the [facility] and was informed they would like a \"request for services\" form faxed to them at [phone] for the 3 services, neurology, neurosurgery, and neuropsychology, regarding the 2nd side Deep Brain Stimulation work-up. A staff message was sent to Becka Adams, neurology  to complete this.  "

## 2023-01-11 NOTE — TELEPHONE ENCOUNTER
Svitlana, patient's daughter, left the writer a detailed message asking for the writer to call her back regarding the patient's [facility] authorization for 2nd side Deep Brain Stimulation work-up.     Spoke to Svitlana and she stated the [facility] will be needing a request sent to them regarding getting the 2nd side Deep Brain Stimulation work-up done as Dr. Ny will need to sign off on this. Svitlana was informed the  for neurology, will be notified as the writer is not sure how this is done.    Svitlana asked that the patient's neuropsychological evaluation for 1/12/23 be rescheduled as well. The patient neuropsychological evaluation with Dr. Cabrera was rescheduled to 1/31/23 at 12:30 PM.

## 2023-01-17 ENCOUNTER — MYC MEDICAL ADVICE (OUTPATIENT)
Dept: NEUROLOGY | Facility: CLINIC | Age: 73
End: 2023-01-17

## 2023-01-17 ENCOUNTER — TELEPHONE (OUTPATIENT)
Dept: NEUROLOGY | Facility: CLINIC | Age: 73
End: 2023-01-17

## 2023-01-17 NOTE — CONFIDENTIAL NOTE
He has not had any side effects since programming, he has had some improvement in his writing on some days but other days it is the same. He reports he takes his medications as prescribed and on time (I asked specifically about gabapentin and he ever misses his doses). Svitlana sent images of his writing the first one from 1/12 and 1/13 and the second one from 1/16 and 1/17. When his writing is better it is better all day and vise versa. Could not identify if a pattern (I.e. stress) being a factor.    He has not changed his Deep Brain Stimulation program or settings. He does not turn it off at all.

## 2023-01-17 NOTE — TELEPHONE ENCOUNTER
Camille left the writer a detailed message stating the patient's daughter spoke to her about verifying that the 2nd side Deep Brain Stimulation work-up would be covered under the current [facility] authorization. Camille stated they would like a request for service form filled out and faxed to them at [phone].     Called Camille back. See telephone encounter on 1/11/23. Camille was informed that the writer was inform the [facility] office did receive the form and was in process of being reviewed per Crissy at their office. Camille stated they have not received the request for service form and that Crissy did not make any notes of that the form has been received or is being reviewed.     Informed Camille that the writer will notify the  of this and have her re-fax the request for service form to their office.

## 2023-01-20 ENCOUNTER — TELEPHONE (OUTPATIENT)
Dept: NEUROLOGY | Facility: CLINIC | Age: 73
End: 2023-01-20

## 2023-01-20 NOTE — TELEPHONE ENCOUNTER
See other telephone encounter on 1/20/23. Svitlana had asked how the [facility] authorization was going since she [facility]. Svitlana was informed Camille contacted the writer and informed her the request for service form was needed. Svitlana was informed there was miscommunication between the [facility] and the [facility] financial counselor. Svitlana was informed the financial counselor informed the writer that she faxed the request for service form to the [facility] on 1/18. Svitlana thanked the writer for the updated information and had no further questions. Svitlana was informed to reach out to the writer if any further issues should arise or if Camille from the [facility], had any questions.

## 2023-01-20 NOTE — TELEPHONE ENCOUNTER
----- Message from Kamilah Franco sent at 1/19/2023 11:19 AM CST -----  Hello,     Came across another reschedule DBS.    Thank You

## 2023-01-23 NOTE — TELEPHONE ENCOUNTER
[facility] Call Center    Phone Message    May a detailed message be left on voicemail: yes     Reason for Call: Other: Pt daughter Svitlana is calling because she just spoke to Margarita about their appt tomorrow at 10am but she is double booked and was wondering if another time would work. She states that Margarita can call her back to discuss     Action Taken: Message routed to:  [facility] ([facility]): Neurology    Travel Screening: Not Applicable

## 2023-01-23 NOTE — CONFIDENTIAL NOTE
Svitlana will help Arley change his program tomorrow. I will call around 10 AM to walk them through this.

## 2023-01-23 NOTE — TELEPHONE ENCOUNTER
Can try program 1 for the next month to determine which is better 1 or 4.  Remind patient that his handwriting is much better when he prints instead of writing in cursive.

## 2023-01-24 NOTE — CONFIDENTIAL NOTE
Arley changed to Program 1. Reminded him to try printing as this was better than cursive for him. He will continue to journal to document the results for his next visit. Advised it can take time to see the full effects of the changes. Svitlana sent a [employer] message with a picture of his writing after switching to program 1.

## 2023-01-29 NOTE — PROGRESS NOTES
Department of Neurology  Movement Disorders Division   Motor Testing Note    Patient: Arley Butt  MRN: [id number]   : 1950   Date of Visit: 2023    Diagnosis: Essential tremor  DBS Target(s): Left VIM  Date(s) of DBS Lead Placement: 2022  Date(s) of IPG Placement: L chest 10/4/2022  Device: [id number]    Chief Complaint:  Arley Butt is a 72 year old male who returns to clinic for follow up of ET status post left VIM DBS.  He presents today for motor testing as a part of his 2nd side DBS evaluation.       Interval History:  Primidone and propranolol tried previously but were ineffective.  Carbidopa/levodopa made him orthostatic and didn't help his tremor either.    Today they brought a copy of his handwriting a spirals from 3x each day on program 4 and 1.  At times his hand writing was legible and the spirals were good.  At other times, both were terrible.  Can't determine whether program 1 or 4 was better.    Looking to have left hand treated now.  He notices tremor in this hand while reaching for objects.  Woodworking is his hobby.  For example, shaking while holding the ruler.  Goals for his hand would be to reduce tremor, be able to do woodworking better, be able to dress easier.    Denies gait, cognitive, or speech side effects.  His daughter explains that he must pause for a movement before walking after standing up.  This began right after the surgery.         Tremor ADL Scale  1. Speaking 0 (0) Normal   2. Feeding with a spoon 2 (2) Mildly abnormal. Spills a little.   3. Drinking from a glass 2 (2) Mildly abnormal. Spills a little.   4. Hygiene 0 (0) Normal   5. Dressing 2 (2) Mildly abnormal. Able to do everything but has difficulty due to tremor.   6. Pouring 3 (3) Moderately abnormal. Must use two hands or uses other strategies to avoid spilling.   7. Carrying food trays, plates or similar items 1 (1) Slightly abnormal. Tremor is present but does not interfere with  carrying food trays, plates or similar items.   8. Using keys 2 (2) Mildly abnormal. Commonly misses target but still routinely puts key in lock with one hand.   9. Writing 3 (3) Moderately abnormal. Cannot write without using strategies such as holding the writing hand with the other hand, holding pen differently or using large pen.   10. Working 2 (2) Mildly abnormal. Tremor interferes with work, able to do everything, but with errors.   11. Overall disability with the most affected task 4 (4) Severely abnormal. Cannot do the task.   Name of most affected task Writing Writing   12. Social impact 1 (1) Aware of tremor, but it does not affect lifestyle or professional life.   ADL TOTAL 22          Related Medications           Gabapentin 300mg 2 2 2 3   Last taken: 7:30am      Medications:  Current Outpatient Medications   Medication Sig Dispense Refill     acetaminophen (TYLENOL) 500 MG tablet Take 500-1,000 mg by mouth every 8 hours as needed for mild pain       Carboxymethylcellulose Sodium 1 % GEL Apply 1 drop to eye nightly as needed       cholecalciferol 50 MCG (2000 UT) tablet TAKE ONE TABLET BY MOUTH DAILY FOR VITAMIN D SUPPLEMENT       cyproheptadine (PERIACTIN) 4 MG tablet Take 12 mg by mouth At Bedtime       finasteride (PROSCAR) 5 MG tablet Take 1 tablet by mouth daily       folic acid (FOLVITE) 1 MG tablet Take 1 tablet by mouth daily       gabapentin (NEURONTIN) 300 MG capsule Patient reports taking 2 x 300 mg tid + 3 x 300 mg at bedtime ([facility] .10.18)    Takes 300 mg capsule and 600 mg tablet (see other order as well)   Dosing as follows:    AM:  300 mg + 600 mg = 900 mg total  Afternoon: 300 mg + 600 mg = 900 mg total  Evenin mg x2 + 600 mg = 1200 mg total  Bedtime: 300 mg x3 + 600 mg = 1200 mg total       mirtazapine (REMERON) 30 MG tablet Take 15 mg by mouth daily       NONFORMULARY Nocturnal CPAP with 2L oxygen       pantoprazole (PROTONIX) 40 MG EC tablet Take 40 mg by mouth 2 times daily        polyethylene glycol (MIRALAX) 17 g packet Take 17 g by mouth daily 30 packet 0     prazosin (MINIPRESS) 5 MG capsule Take 10 mg by mouth At Bedtime       propylene glycol (SYSTANE BALANCE) 0.6 % SOLN ophthalmic solution Apply 1 drop to eye 4 times daily as needed       sertraline (ZOLOFT) 100 MG tablet Take 150 mg by mouth daily       simvastatin (ZOCOR) 40 MG tablet Take 0.5 tablets by mouth At Bedtime       vitamin B-12 (CYANOCOBALAMIN) 1000 MCG tablet Take 1 tablet by mouth daily       gabapentin (NEURONTIN) 600 MG tablet Takes 300 mg capsule and 600 mg tablet (see other order as well)   Dosing as follows:    AM:  300 mg + 600 mg = 900 mg total  Afternoon: 300 mg + 600 mg = 900 mg total  Evenin mg x2 + 600 mg = 1200 mg total  Bedtime: 300 mg x3 + 600 mg = 1200 mg total (Patient not taking: Reported on 2022)       oxyCODONE (ROXICODONE) 5 MG tablet Take 1 tablet (5 mg) by mouth every 6 hours as needed for pain (Patient not taking: Reported on 10/18/2022) 12 tablet 0     SENNA-docusate sodium (SENNA S) 8.6-50 MG tablet Take 2 tablets by mouth 2 times daily (Patient not taking: Reported on 2023) 60 tablet 0     tamsulosin (FLOMAX) 0.4 MG capsule Take 1 capsule by mouth daily (Patient not taking: Reported on 2023)       triamcinolone (KENALOG) 0.1 % external cream Apply 1 Film topically daily as needed (Patient not taking: Reported on 2023)          Review of Systems:  Other than that noted at the end of this note, the remainder of 12 systems reviewed were negative.    Allergies: has No Known Allergies.    Past Medical History:  Past Medical History:   Diagnosis Date     Alcohol dependence (H)      Anemia      Anxiety      BPH (benign prostatic hyperplasia)      Cataract      Chronic pain      DVT (deep venous thrombosis) (H)      Dysphagia      Dysthymia      Erosive gastritis      Esophagitis      Follicular lymphoma (H)      Foot sprain      Gastroesophageal reflux disease with  esophagitis      HTN (hypertension)      Hyperlipidemia      Impacted cerumen      MCI (mild cognitive impairment)      Osteoarthritis      Presbyopia      PTSD (post-traumatic stress disorder)      Sensorineural hearing loss, bilateral      Tinnitus, bilateral      Urinary frequency        Past Surgical History:  Past Surgical History:   Procedure Laterality Date     ANKLE SURGERY Right      BACK SURGERY      lower back, herniated disc     CYSTECTOMY      pilonidal     IMPLANT DEEP BRAIN STIMULATION GENERATOR / BATTERY Left 10/4/2022    Procedure: Deep brain stimulator placement, phase II, placement of deep brain stimulator generator/battery over the left chest wall;  Surgeon: Manuel Otero MD;  Location: [facility] OR     OPTICAL TRACKING SYSTEM INSERTION DEEP BRAIN STIMULATION Left 9/26/2022    Procedure: stealth assisted Left side deep brain stimulator placement, phase I, placement of left side deep brain stimulator electrode, target left ventral intermediate nucleus of the thalamus, with microelectrode recording;  Surgeon: Manuel Otero MD;  Location: [facility] OR     VASECTOMY         Social History:  Social History     Socioeconomic History     Marital status:    Tobacco Use     Smoking status: Former     Smokeless tobacco: Never       Family History:  Family History   Problem Relation Age of Onset     Tremor Father          Physical Exam:  The patient's  weight is 93.3 kg (205 lb 9.6 oz). His blood pressure is 137/87 and his pulse is 75. His oxygen saturation is 97%.       Neurological Examination:   Last medication dose: 7:30am today  DBS on program 1  DBS off at 10:35am  Tremor Motor Scale 1/30/2023 1/30/2023   Assessment Time 10:19 AM 11:04 AM   Medication On On   DBS - Right Brain None None   DBS - Left Brain On Off   Head 0 0   Face & Jaw 0 0   Voice 0 0   Outstretched - RIGHT 0.5 0.5   Outstretched - LEFT 1 1   Wingbeating - RIGHT 0.5 0.5   Wingbeating - LEFT 0.5 0.5   Kinetic - RIGHT 0 1.5    Kinetic - LEFT 2 1.5   Lower Limb - RIGHT 0 0   Lower Limb - LEFT 0 0   Lower Limb (Max) 0 0   Spiral - RIGHT 1 1   Spiral - LEFT 1 1   Handwriting 4 4   Dot approx - RIGHT 1.5 1.5   Dot approx - LEFT 0.5 0.5   Trunk (Standing) 0 0   Total Right 3.5 5   Total Left 5 4.5   Axial 0 0   TOTAL 12.5 13.5     Before surgery:  Tremor Motor Scale 4/13/2022   Assessment Time 1:31 PM   Medication On   DBS - Right Brain None   DBS - Left Brain None   Head 0   Face & Jaw 0   Voice 1   Outstretched - RIGHT 1   Outstretched - LEFT 1   Wingbeating - RIGHT 1   Wingbeating - LEFT 0   Kinetic - RIGHT 2   Kinetic - LEFT 2   Lower Limb - RIGHT 0   Lower Limb - LEFT 0   Lower Limb (Max) 0   Spiral - RIGHT 2   Spiral - LEFT 1.5   Handwriting 4   Dot approx - RIGHT 2   Dot approx - LEFT 1   Trunk (Standing) 0   Total Right 8   Total Left 5.5   Axial 1   TOTAL 18.5         Procedure: DBS Interrogation & Programming  Lead(s):    Left   DBS Target VIM   DBS Lead Type Cartesia   Lead Implant Date 9/26/2022      IPG(s):    1   IPG Genus R16   IPG Implant Date 10/4/2022   Location Left chest   Battery (V) /     Impedance Check: No problems found  See scanned report for impedance details.    Program 1 Left Brain             Initial Final     Active Inactive   Amplitude (mA) 4.5 [0-4.5]  4.0 [0-4.0] 4.5 [0-4.5]  4.0 [0-4.0]   Pulse width (usec) 50 50   Freq (Hz) 149  104 149  104   Contacts: C+L4-  C+L3- C+L4-  C+L3-      Program 2 Left Brain             Initial Final     Inactive Active   Amplitude /mA) 3.0 [0-3.5] 3.0 [0-3.5]   Pulse width (usec) 60 60   Freq (Hz) 204 204   Contacts: C+L4- C+L4-      Program 3 Left Brain             Initial Final     Inactive Inactive   Amplitude /mA) 3.0 [0-3.0] 3.0 [0-3.0]   Pulse width (usec) 60 60   Freq (Hz) 204 204   Contacts: C+L3L4-  (50/50) C+L3L4- (50/50)      Program 4 Left Brain             Initial Final     Inactive Inactive   Amplitude (mA) 6.5 [0-6.5]  4.5 [0-4.5] 6.5 [0-6.5]  4.5 [0-4.5]    Pulse width (usec) 40 40   Freq (Hz) 139  113 139  116   Contacts: C+L4-  C+L3- C+L4-  C+L3-         Assessment/Plan:  Arley Butt is a 72 year old male with ET s/p left VIM DBS who presents for evaluation for right VIM implantation.  Today we completed motor testing with DBS on and off and there was minimal difference between the two; however there has been more improvement compared to testing prior to surgery.    - Changed from program 1 to 2  - Neuropsych testing remaining      Kaylynn Plummer MD   of Neurology  Movement Disorders Division       53 minutes spent on the date of the encounter doing chart review, history and exam, documentation and further activities as noted above.     Additional time spent for separate DBS programmin min DBS analyzed with reprogramming.

## 2023-01-30 ENCOUNTER — TELEPHONE (OUTPATIENT)
Dept: NEUROLOGY | Facility: CLINIC | Age: 73
End: 2023-01-30

## 2023-01-30 ENCOUNTER — OFFICE VISIT (OUTPATIENT)
Dept: NEUROLOGY | Facility: CLINIC | Age: 73
End: 2023-01-30
Payer: COMMERCIAL

## 2023-01-30 ENCOUNTER — TELEPHONE (OUTPATIENT)
Dept: NEUROSURGERY | Facility: CLINIC | Age: 73
End: 2023-01-30

## 2023-01-30 VITALS
WEIGHT: 205.6 LBS | HEART RATE: 75 BPM | BODY MASS INDEX: 33.18 KG/M2 | SYSTOLIC BLOOD PRESSURE: 137 MMHG | OXYGEN SATURATION: 97 % | DIASTOLIC BLOOD PRESSURE: 87 MMHG

## 2023-01-30 DIAGNOSIS — G25.0 ESSENTIAL TREMOR: Primary | ICD-10-CM

## 2023-01-30 DIAGNOSIS — Z96.89 S/P DEEP BRAIN STIMULATOR PLACEMENT: ICD-10-CM

## 2023-01-30 PROCEDURE — 99215 OFFICE O/P EST HI 40 MIN: CPT | Mod: 25 | Performed by: STUDENT IN AN ORGANIZED HEALTH CARE EDUCATION/TRAINING PROGRAM

## 2023-01-30 PROCEDURE — 95983 ALYS BRN NPGT PRGRMG 15 MIN: CPT | Performed by: STUDENT IN AN ORGANIZED HEALTH CARE EDUCATION/TRAINING PROGRAM

## 2023-01-30 ASSESSMENT — ACTIVITIES OF DAILY LIVING (ADL)
DRESS: (2) MILDLY ABNORMAL. ABLE TO DO EVERYTHING BUT HAS DIFFICULTY DUE TO TREMOR.
SOCIAL_IMPACT: (1) AWARE OF TREMOR, BUT IT DOES NOT AFFECT LIFESTYLE OR PROFESSIONAL LIFE.
WORKING: (2) MILDLY ABNORMAL. TREMOR INTERFERES WITH WORK, ABLE TO DO EVERYTHING, BUT WITH ERRORS.
POURING: (3) MODERATELY ABNORMAL. MUST USE TWO HANDS OR USES OTHER STRATEGIES TO AVOID SPILLING.
TOTAL_SCORE: 22
USING_KEYS: (2) MILDLY ABNORMAL. COMMONLY MISSES TARGET BUT STILL ROUTINELY PUTS KEY IN LOCK WITH ONE HAND.
FEEDING_WITH_A_SPOON: (2) MILDLY ABNORMAL. SPILLS A LITTLE.
OVERALL_DISABILITY_WITH_THE_MOST_AFFECTED_TASK: (4) SEVERELY ABNORMAL. CANNOT DO THE TASK.
HYGIENE: (0) NORMAL
WRITING: (3) MODERATELY ABNORMAL. CANNOT WRITE WITHOUT USING STRATEGIES SUCH AS HOLDING THE WRITING HAND WITH THE OTHER HAND, HOLDING PEN DIFFERENTLY OR USING LARGE PEN.
SPEAKING: (0) NORMAL
OVERALL_DISABILITY_WITH_THE_MOST_AFFECTED_TASK: WRITING
DRINKING_FROM_A_GLASS: (2) MILDLY ABNORMAL. SPILLS A LITTLE.
CARRYING_FOOD_TRAYS_PLATES_OR_SIMILAR_ITEMS: (1) SLIGHTLY ABNORMAL. TREMOR IS PRESENT BUT DOES NOT INTERFERE WITH CARRYING FOOD TRAYS, PLATES OR SIMILAR ITEMS.

## 2023-01-30 ASSESSMENT — PAIN SCALES - GENERAL: PAINLEVEL: NO PAIN (0)

## 2023-01-30 NOTE — TELEPHONE ENCOUNTER
Svitlana left the writer a message asking for the writer to call her back regarding if she needs to sign up for [facility] and MyChart again. Called Svitlana back and informed her they don't need to sign up for MyChart but that the writer can put in another order for the patient to sign up for [facility] since his appointments got pushed out later in January. Order was placed. Svitlana was informed the writer will send her a DBS Medical Safety Consideration sheet for her and the patient to keep for their records. Svitlana stated she would like this for the patient. The DBS Medical Safety Consideration sheet was sent to the patient via [employer]. Svitlana had no furthers questions for the writer and thanked the writer for her time.

## 2023-01-30 NOTE — PATIENT INSTRUCTIONS
I changed you to program 2.  This is set at 3.0 but can increase up to 3.5 if needed.    Try writing samples 1 hour after you take gabapentin.

## 2023-01-30 NOTE — TELEPHONE ENCOUNTER
Called pt's daughter Svitlana caba. Pt is going through 2nd side DBS workup and daughter wanted to know if pt would have any flying restrictions. We would want pt to refrain from flying the first week or 2 after surgery to make sure the pt is OK postoperatively. Unless there are surgical complications, there aren't really flying restrictions beyond this.     Pt should always bring his patient  and 's card with him when traveling. He can let security know he has a medical device and can request to be wanded instead of going through the larger [employer] machine. Security system should not turn off  his DBS battery, but he should have the  with him just in case.     Daughter expressed understanding. Nothing further at this time.

## 2023-01-30 NOTE — LETTER
2023       RE: Arley Butt  [address] [facility] [address]     Dear Colleague,    Thank you for referring your patient, Arley Butt, to the [facility]. Please see a copy of my visit note below.    Department of Neurology  Movement Disorders Division   Motor Testing Note    Patient: Arley Butt  MRN: [id number]   : 1950   Date of Visit: 2023    Diagnosis: Essential tremor  DBS Target(s): Left VIM  Date(s) of DBS Lead Placement: 2022  Date(s) of IPG Placement: L chest 10/4/2022  Device: Vartopia    Chief Complaint:  Arley Butt is a 72 year old male who returns to clinic for follow up of ET status post left VIM DBS.  He presents today for motor testing as a part of his 2nd side DBS evaluation.       Interval History:  Primidone and propranolol tried previously but were ineffective.  Carbidopa/levodopa made him orthostatic and didn't help his tremor either.    Today they brought a copy of his handwriting a spirals from 3x each day on program 4 and 1.  At times his hand writing was legible and the spirals were good.  At other times, both were terrible.  Can't determine whether program 1 or 4 was better.    Looking to have left hand treated now.  He notices tremor in this hand while reaching for objects.  Woodworking is his hobby.  For example, shaking while holding the ruler.  Goals for his hand would be to reduce tremor, be able to do woodworking better, be able to dress easier.    Denies gait, cognitive, or speech side effects.  His daughter explains that he must pause for a movement before walking after standing up.  This began right after the surgery.         Tremor ADL Scale  1. Speaking 0 (0) Normal   2. Feeding with a spoon 2 (2) Mildly abnormal. Spills a little.   3. Drinking from a glass 2 (2) Mildly abnormal. Spills a little.   4. Hygiene 0 (0) Normal   5. Dressing 2  (2) Mildly abnormal. Able to do everything but has difficulty due to tremor.   6. Pouring 3 (3) Moderately abnormal. Must use two hands or uses other strategies to avoid spilling.   7. Carrying food trays, plates or similar items 1 (1) Slightly abnormal. Tremor is present but does not interfere with carrying food trays, plates or similar items.   8. Using keys 2 (2) Mildly abnormal. Commonly misses target but still routinely puts key in lock with one hand.   9. Writing 3 (3) Moderately abnormal. Cannot write without using strategies such as holding the writing hand with the other hand, holding pen differently or using large pen.   10. Working 2 (2) Mildly abnormal. Tremor interferes with work, able to do everything, but with errors.   11. Overall disability with the most affected task 4 (4) Severely abnormal. Cannot do the task.   Name of most affected task Writing Writing   12. Social impact 1 (1) Aware of tremor, but it does not affect lifestyle or professional life.   ADL TOTAL 22          Related Medications           Gabapentin 300mg 2 2 2 3   Last taken: 7:30am      Medications:  Current Outpatient Medications   Medication Sig Dispense Refill     acetaminophen (TYLENOL) 500 MG tablet Take 500-1,000 mg by mouth every 8 hours as needed for mild pain       Carboxymethylcellulose Sodium 1 % GEL Apply 1 drop to eye nightly as needed       cholecalciferol 50 MCG (2000 UT) tablet TAKE ONE TABLET BY MOUTH DAILY FOR VITAMIN D SUPPLEMENT       cyproheptadine (PERIACTIN) 4 MG tablet Take 12 mg by mouth At Bedtime       finasteride (PROSCAR) 5 MG tablet Take 1 tablet by mouth daily       folic acid (FOLVITE) 1 MG tablet Take 1 tablet by mouth daily       gabapentin (NEURONTIN) 300 MG capsule Patient reports taking 2 x 300 mg tid + 3 x 300 mg at bedtime ([facility] 2022.10.18)    Takes 300 mg capsule and 600 mg tablet (see other order as well)   Dosing as follows:    AM:  300 mg + 600 mg = 900 mg total  Afternoon: 300 mg + 600 mg  = 900 mg total  Evenin mg x2 + 600 mg = 1200 mg total  Bedtime: 300 mg x3 + 600 mg = 1200 mg total       mirtazapine (REMERON) 30 MG tablet Take 15 mg by mouth daily       NONFORMULARY Nocturnal CPAP with 2L oxygen       pantoprazole (PROTONIX) 40 MG EC tablet Take 40 mg by mouth 2 times daily       polyethylene glycol (MIRALAX) 17 g packet Take 17 g by mouth daily 30 packet 0     prazosin (MINIPRESS) 5 MG capsule Take 10 mg by mouth At Bedtime       propylene glycol (SYSTANE BALANCE) 0.6 % SOLN ophthalmic solution Apply 1 drop to eye 4 times daily as needed       sertraline (ZOLOFT) 100 MG tablet Take 150 mg by mouth daily       simvastatin (ZOCOR) 40 MG tablet Take 0.5 tablets by mouth At Bedtime       vitamin B-12 (CYANOCOBALAMIN) 1000 MCG tablet Take 1 tablet by mouth daily       gabapentin (NEURONTIN) 600 MG tablet Takes 300 mg capsule and 600 mg tablet (see other order as well)   Dosing as follows:    AM:  300 mg + 600 mg = 900 mg total  Afternoon: 300 mg + 600 mg = 900 mg total  Evenin mg x2 + 600 mg = 1200 mg total  Bedtime: 300 mg x3 + 600 mg = 1200 mg total (Patient not taking: Reported on 2022)       oxyCODONE (ROXICODONE) 5 MG tablet Take 1 tablet (5 mg) by mouth every 6 hours as needed for pain (Patient not taking: Reported on 10/18/2022) 12 tablet 0     SENNA-docusate sodium (SENNA S) 8.6-50 MG tablet Take 2 tablets by mouth 2 times daily (Patient not taking: Reported on 2023) 60 tablet 0     tamsulosin (FLOMAX) 0.4 MG capsule Take 1 capsule by mouth daily (Patient not taking: Reported on 2023)       triamcinolone (KENALOG) 0.1 % external cream Apply 1 Film topically daily as needed (Patient not taking: Reported on 2023)          Review of Systems:  Other than that noted at the end of this note, the remainder of 12 systems reviewed were negative.    Allergies: has No Known Allergies.    Past Medical History:  Past Medical History:   Diagnosis Date     Alcohol dependence  (H)      Anemia      Anxiety      BPH (benign prostatic hyperplasia)      Cataract      Chronic pain      DVT (deep venous thrombosis) (H)      Dysphagia      Dysthymia      Erosive gastritis      Esophagitis      Follicular lymphoma (H)      Foot sprain      Gastroesophageal reflux disease with esophagitis      HTN (hypertension)      Hyperlipidemia      Impacted cerumen      MCI (mild cognitive impairment)      Osteoarthritis      Presbyopia      PTSD (post-traumatic stress disorder)      Sensorineural hearing loss, bilateral      Tinnitus, bilateral      Urinary frequency        Past Surgical History:  Past Surgical History:   Procedure Laterality Date     ANKLE SURGERY Right      BACK SURGERY      lower back, herniated disc     CYSTECTOMY      pilonidal     IMPLANT DEEP BRAIN STIMULATION GENERATOR / BATTERY Left 10/4/2022    Procedure: Deep brain stimulator placement, phase II, placement of deep brain stimulator generator/battery over the left chest wall;  Surgeon: Manuel Otero MD;  Location: [facility] OR     OPTICAL TRACKING SYSTEM INSERTION DEEP BRAIN STIMULATION Left 9/26/2022    Procedure: stealth assisted Left side deep brain stimulator placement, phase I, placement of left side deep brain stimulator electrode, target left ventral intermediate nucleus of the thalamus, with microelectrode recording;  Surgeon: Manuel Otero MD;  Location: [facility] OR     VASECTOMY         Social History:  Social History     Socioeconomic History     Marital status:    Tobacco Use     Smoking status: Former     Smokeless tobacco: Never       Family History:  Family History   Problem Relation Age of Onset     Tremor Father          Physical Exam:  The patient's  weight is 93.3 kg (205 lb 9.6 oz). His blood pressure is 137/87 and his pulse is 75. His oxygen saturation is 97%.       Neurological Examination:   Last medication dose: 7:30am today  DBS on program 1  DBS off at 10:35am  Tremor Motor Scale 1/30/2023 1/30/2023    Assessment Time 10:19 AM 11:04 AM   Medication On On   DBS - Right Brain None None   DBS - Left Brain On Off   Head 0 0   Face & Jaw 0 0   Voice 0 0   Outstretched - RIGHT 0.5 0.5   Outstretched - LEFT 1 1   Wingbeating - RIGHT 0.5 0.5   Wingbeating - LEFT 0.5 0.5   Kinetic - RIGHT 0 1.5   Kinetic - LEFT 2 1.5   Lower Limb - RIGHT 0 0   Lower Limb - LEFT 0 0   Lower Limb (Max) 0 0   Spiral - RIGHT 1 1   Spiral - LEFT 1 1   Handwriting 4 4   Dot approx - RIGHT 1.5 1.5   Dot approx - LEFT 0.5 0.5   Trunk (Standing) 0 0   Total Right 3.5 5   Total Left 5 4.5   Axial 0 0   TOTAL 12.5 13.5     Before surgery:  Tremor Motor Scale 4/13/2022   Assessment Time 1:31 PM   Medication On   DBS - Right Brain None   DBS - Left Brain None   Head 0   Face & Jaw 0   Voice 1   Outstretched - RIGHT 1   Outstretched - LEFT 1   Wingbeating - RIGHT 1   Wingbeating - LEFT 0   Kinetic - RIGHT 2   Kinetic - LEFT 2   Lower Limb - RIGHT 0   Lower Limb - LEFT 0   Lower Limb (Max) 0   Spiral - RIGHT 2   Spiral - LEFT 1.5   Handwriting 4   Dot approx - RIGHT 2   Dot approx - LEFT 1   Trunk (Standing) 0   Total Right 8   Total Left 5.5   Axial 1   TOTAL 18.5         Procedure: DBS Interrogation & Programming  Lead(s):    Left   DBS Target VIM   DBS Lead Type Cartesia   Lead Implant Date 9/26/2022      IPG(s):    1   IPG Genus R16   IPG Implant Date 10/4/2022   Location Left chest   Battery (V) /     Impedance Check: No problems found  See scanned report for impedance details.    Program 1 Left Brain             Initial Final     Active Inactive   Amplitude (mA) 4.5 [0-4.5]  4.0 [0-4.0] 4.5 [0-4.5]  4.0 [0-4.0]   Pulse width (usec) 50 50   Freq (Hz) 149  104 149  104   Contacts: C+L4-  C+L3- C+L4-  C+L3-      Program 2 Left Brain             Initial Final     Inactive Active   Amplitude /mA) 3.0 [0-3.5] 3.0 [0-3.5]   Pulse width (usec) 60 60   Freq (Hz) 204 204   Contacts: C+L4- C+L4-      Program 3 Left Brain             Initial Final      Inactive Inactive   Amplitude /mA) 3.0 [0-3.0] 3.0 [0-3.0]   Pulse width (usec) 60 60   Freq (Hz) 204 204   Contacts: C+L3L4-  (50/50) C+L3L4- (50/50)      Program 4 Left Brain             Initial Final     Inactive Inactive   Amplitude (mA) 6.5 [0-6.5]  4.5 [0-4.5] 6.5 [0-6.5]  4.5 [0-4.5]   Pulse width (usec) 40 40   Freq (Hz) 139  113 139  116   Contacts: C+L4-  C+L3- C+L4-  C+L3-         Assessment/Plan:  Arley Butt is a 72 year old male with ET s/p left VIM DBS who presents for evaluation for right VIM implantation.  Today we completed motor testing with DBS on and off and there was minimal difference between the two; however there has been more improvement compared to testing prior to surgery.    - Changed from program 1 to 2  - Neuropsych testing remaining      Kaylynn Plummer MD   of Neurology  Movement Disorders Division       53 minutes spent on the date of the encounter doing chart review, history and exam, documentation and further activities as noted above.     Additional time spent for separate DBS programmin min DBS analyzed with reprogramming.         Sincerely,    Kaylynn Plummer MD

## 2023-01-31 ENCOUNTER — OFFICE VISIT (OUTPATIENT)
Dept: NEUROPSYCHOLOGY | Facility: CLINIC | Age: 73
End: 2023-01-31
Attending: STUDENT IN AN ORGANIZED HEALTH CARE EDUCATION/TRAINING PROGRAM
Payer: COMMERCIAL

## 2023-01-31 DIAGNOSIS — Z96.89 S/P DEEP BRAIN STIMULATOR PLACEMENT: ICD-10-CM

## 2023-01-31 DIAGNOSIS — F09 MENTAL DISORDER DUE TO GENERAL MEDICAL CONDITION: Primary | ICD-10-CM

## 2023-01-31 DIAGNOSIS — G25.0 ESSENTIAL TREMOR: ICD-10-CM

## 2023-01-31 PROCEDURE — 96133 NRPSYC TST EVAL PHYS/QHP EA: CPT

## 2023-01-31 PROCEDURE — 90791 PSYCH DIAGNOSTIC EVALUATION: CPT

## 2023-01-31 PROCEDURE — 96138 PSYCL/NRPSYC TECH 1ST: CPT

## 2023-01-31 PROCEDURE — 96132 NRPSYC TST EVAL PHYS/QHP 1ST: CPT

## 2023-01-31 PROCEDURE — 96139 PSYCL/NRPSYC TST TECH EA: CPT

## 2023-01-31 NOTE — LETTER
1/31/2023      RE: Arley Butt  [address] [facility] [place] [address]       NEUROPSYCHOLOGICAL EVALUATION    RELEVANT HISTORY AND REASON FOR REFERRAL    Arley Butt is a 72 year old, right handed, retired  with 11 years of formal education. Information was obtained via interview with the patient and his daughter, and review of his medical records. Records indicate a history of essential tremor with onset about 5 years ago in his right hand.  His tremor has progressively worsened and is now present in his left hand.  Medications have been ineffective, and the tremor is increasingly affecting his functioning. He underwent left VIM DBS lead implantation on 10/4/2022. He had variable benefit to his tremor, and is now interested in undergoing right DBS lead implantation to address his left side. 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 Given these findings, the surgery team requested a repeat neuropsychological evaluation prior to proceeding with surgery for the second side, for further characterization of any cognitive difficulties and to evaluate mood.      Records indicate that Mr. Butt [facility] [place] and an evaluation under my direction as well. The report from his 7/30/2021 visit under the direction of Vinay Beckham, Ph.D., has been scanned into Epic. The findings were noted to reflect mild neurocognitive disorder, with performance demonstrating a slight decline as compared to 2017 testing in learning and memory, language, attention and concentration, and processing speed. He had deficits in processing speed, with variable scores ranging from average to below average, although these may have been affected by motor functioning. Scores on learning and memory measures ranged from below average to exceptionally low for verbal material  with relatively better performance for visual information, falling in the low average range. He underwent a neuropsychological evaluation under my direction on 4/14/2022 prior to surgery. As noted, results indicated prominent impairments in verbal learning and memory,while memory for visual information fell within normal limits. He was distractible. Performance otherwise fell within normal limits across cognitive domains. Compared to his July 2021 evaluation, verbal memory was generally stable, memory for visual information was stable or slightly improved, and he had less variability within cognitive domains.      CLINICAL INTERVIEW FINDINGS    Upon interview, Mr. Butt stated that his first surgery went well.  He indicated that his benefit has been somewhat sporadic.  His goal for the next surgery would be to be able to completely write his name.  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  He noted that these tasks are not better yet because he still struggles with tremor on his left side.  His daughter stated that he does well with his right hand, and he can pour liquids.  He indicated that his neurologist changed his program yesterday, and he is hoping for more consistent benefit.  He has a good understanding of the surgical procedure and stated that he did fine being awake during the surgery.  His daughter noted that it was helpful to have someone there when he woke up reminding him that he was fine.  He was not able to list any of the risks of the procedure, however.  He lives with his wife, who will be able to assist with his cares as necessary following the surgery.  He has daughters who live nearby and would also be able to assist as necessary.    Mr. Butt and his daughter agree that he has not had any changes in cognition.  He is not forgetting what others have said, has not  forgotten names of familiar people, and has not become lost in familiar places.  He is not misplacing items anymore than normal and his daughter stated that he is not repeating questions or stories.  He has not noticed difficulty with word finding, attention or concentration, or decision making.  He and his wife share the management of their finances, and he denied any difficulty with this.  He has a nurse who sets up his medications once a week, and has had no problems remembering to take them on a daily basis.  He drives without difficulty and handles his personal cares independently.    Mr. Butt described his mood as all right.  He stated that he is occasionally depressed but not more than in the past.  He has not had feelings of guilt and he has not felt anxious.  He sleeps well, 8 hours a night.  He feels rested in the morning and only occasionally naps.  His appetite has not been very strong but his weight has been stable.  His energy level is good.  His interest level is also good, although he wishes he could do woodworking.  He denied visual or auditory hallucinations.  He denied suicidal ideation or any history of suicide attempts.  He does not drink alcohol or use tobacco.    Since his last evaluation, he has not had any major illnesses or injuries.  He has never had COVID.  His balance is fair.  He will occasionally tip one way or the other when he stands up.  He has not had unilateral weakness or numbness.  He is not bothered by headaches.  He has arthritis in his finger.  He wears hearing aids but did not wear them today, as the left side has been bothering him.    Past Medical History  Past Medical History:   Diagnosis Date     Alcohol dependence (H)      Anemia      Anxiety      BPH (benign prostatic hyperplasia)      Cataract      Chronic pain      DVT (deep venous thrombosis) (H)      Dysphagia      Dysthymia      Erosive gastritis      Esophagitis      Follicular lymphoma (H)      Foot sprain       Gastroesophageal reflux disease with esophagitis      HTN (hypertension)      Hyperlipidemia      Impacted cerumen      MCI (mild cognitive impairment)      Osteoarthritis      Presbyopia      PTSD (post-traumatic stress disorder)      Sensorineural hearing loss, bilateral      Tinnitus, bilateral      Urinary frequency        Current Medications  He has a current medication list which includes the following prescription(s): acetaminophen, carboxymethylcellulose sodium, cholecalciferol, cyproheptadine, finasteride, folic acid, gabapentin, gabapentin, mirtazapine, NONFORMULARY, oxycodone, pantoprazole, polyethylene glycol, prazosin, propylene glycol, senna-docusate sodium, sertraline, simvastatin, tamsulosin, triamcinolone, and vitamin b-12.    Family History    Significant for a father who may have had tremor.  He noted that both of his parents drank a lot of alcohol. Records note that his mother had a stroke.  He has no known family history of dementia.    BEHAVIORAL OBSERVATIONS    During the evaluation, Mr. Butt was pleasant, cooperative, and seemed to understand the instructions.  He was alert and oriented to person, place, and time.  During the rooming, he called his daughter by the wrong name but corrected himself.  Tremors were observed clinically in his right hand when he was using it.  This affected his performance on some tasks involving use of a pencil.  Mood was somewhat depressed and he made doubtful comments about his performance on tasks, particularly relating to his tremor.  Speech was fluent, with normal articulation, volume, and rate.  He presented his thoughts in a clear, logical manner.  Internal performance validity measures fell within normal limits.  The results are believed to accurately reflect his current level of functioning.    MEASURES ADMINISTERED    The following measures were administered by a trained psychometrist, under the direct supervision of a licensed  psychologist.    Subtests of the Wechsler Adult Intelligence Scale-4; subtests of the Wechsler Memory Scale-4; Lozano Verbal Learning Test-Revised, Form 3; Brief Visual Memory Test - Revised, Form 2; Wesley Naming Test; D-KEFS Verbal Fluency, Alternate Form; Trail Making Test; Guzman Judgement of Line Orientation Form H; Jam-Osterrieth Complex Figure; Clock Drawing; Dementia Rating Scale - 2 (DRS-2) Alternate Form;  MoCA v. 7.1; Cuevas Depression Inventory-2 (BDI-2), Apathy Scale.     RESULTS AND INTERPRETATION    Performance on a screening measure of dementia was average (DRS-2 Total Score = 136/144).  Performance on the MoCA fell below expected (18/30).  Performance on this task was notable for errors in cube and clock drawings, at least partially due to marked tremor. Errors were also noted in digit span, repetition, reduced fluency, abstraction, and memory.    Confrontation naming was average for his age. Letter fluency and switching fluency were below average for his age. Generative naming to category was low average.    Attention span was average for his age.  Divided attention was average. Psychomotor processing speed was average.    Basic visual perception, including matching lines and angles, was exceptionally high for his age.  Construction of a clock was within normal limits, but was notable for significant tremor.     Immediate recall of verbal narrative material was below average. Recall of the learned material following a 30 minute delay was below average. Recognition memory on this task was low average.  On a multiple trial list learning task, immediate recall was low average, with below average recall following a 25-minute delay.  Recognition memory on this task was average.  Immediate recall of visual material was below average, with  average retention of the learned material following a 25-minute delay. Recognition memory on this task below average.    On the BDI-2, a self-report questionnaire, he  endorsed minimal depressive symptomatology.  He endorsed minimal apathy on a scale.     IMPRESSIONS AND RECOMMENDATIONS    Arley Butt is a 72 year old man with a history of essential tremor.  He underwent left VIM DBS lead implantation on 10/4/2022.  Neuropsychological evaluations prior to that surgery were suggestive of amnestic MCI, affecting verbal memory.  Given the presence of MCI, the surgery team requested a neuropsychological evaluation prior to considering right DBS lead implantation.    Results of today's evaluation indicate impairments in learning of new information.  Memory is impaired, but he sometimes benefits from cues, suggesting retrieval difficulties.  Fluency is slowed.  Drawings and written tasks were notable for significant tremor using his right hand.  Basic language, visual processing, and attention all fell within normal limits.  He endorsed minimal depressive symptomatology and apathy.    Compared to his most recent evaluation on 10/4/2022, he has had improvements in verbal memory, particularly on recognition tasks.  Letter fluency has declined, while semantic fluency and switching fluency remains stable.  Performance otherwise remains relatively stable across cognitive domains.    This pattern of performance continues to reflect MCI; however, the findings do not appear to reflect a rapid forgetting rate, and memory has in fact improved since his October evaluation.  Taken together with prior evaluations, there is not a clear pattern of decline over time.  He continues to be capable of comprehending medical information and making well reasoned decisions for himself.  He has a good understanding of the surgical procedure and feels confident about being awake during the surgery.  Of note, he was not able to list any of the risks of the surgery, and these should be reviewed with him.  He has a good support system in his family. Based on the available information, he is an adequate candidate  for DBS surgery from a neurocognitive perspective.    Results may serve as a current baseline of his neurocognitive functioning.  It may be helpful to continue to follow him over time.  Repeated neuropsychological evaluation in 1 year may help to determine whether his cognitive difficulties progress.    Skylar Cabrera, Ph.D., ABPP  Licensed Psychologist, LP [id number]  Board Certified in Clinical Neuropsychology      Time spent: One unit of professional time, including interview (CPT 52511). 60 minutes (1 unit) neuropsychological testing evaluation by licensed and board-certified neuropsychologist, including integration of patient data, interpretation of standardized test results and clinical data, clinical decision-making, treatment planning, report, and interactive feedback to the patient, first hour (CPT 07544). 96 minutes (2 units) of neuropsychological testing evaluation by licensed and board-certified neuropsychologist, including integration of patient data, interpretation of standardized test results and clinical data, clinical decision-making, treatment planning, report, and interactive feedback to the patient, subsequent hours (CPT 17738). 30 minutes of neuropsychological test administration and scoring by technician, first 30 minutes (CPT 17676). 177 additional minutes (* units) neuropsychological test administration and scoring by technician, subsequent 30 minutes (CPT 02813). ICD-10 Diagnoses: G25; F06.8.          Skylar Cabrera, PhD LP

## 2023-02-02 NOTE — NURSING NOTE
Pt was seen for neuropsychological evaluation at the request of Kaylynn Plummer MD for the purposes of diagnostic clarification and treatment planning. 207 minutes of test administration and scoring were provided by this writer. Please see Dr. Skylar Cabrera's report for a full interpretation of the findings.    Giovanna Dsouza  Psychometrist

## 2023-02-07 NOTE — PROGRESS NOTES
NEUROPSYCHOLOGICAL EVALUATION    RELEVANT HISTORY AND REASON FOR REFERRAL    Arley Butt is a 72 year old, right handed, retired  with 11 years of formal education. Information was obtained via interview with the patient and his daughter, and review of his medical records. Records indicate a history of essential tremor with onset about 5 years ago in his right hand.  His tremor has progressively worsened and is now present in his left hand.  Medications have been ineffective, and the tremor is increasingly affecting his functioning. He underwent left VIM DBS lead implantation on 10/4/2022. He had variable benefit to his tremor, and is now interested in undergoing right DBS lead implantation to address his left side. 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 Given these findings, the surgery team requested a repeat neuropsychological evaluation prior to proceeding with surgery for the second side, for further characterization of any cognitive difficulties and to evaluate mood.      Records indicate that [facility] Butt [facility] [place] and an evaluation under my direction as well. The report from his 7/30/2021 visit under the direction of Vinay Beckham, Ph.D., has been scanned into Epic. The findings were noted to reflect mild neurocognitive disorder, with performance demonstrating a slight decline as compared to 2017 testing in learning and memory, language, attention and concentration, and processing speed. He had deficits in processing speed, with variable scores ranging from average to below average, although these may have been affected by motor functioning. Scores on learning and memory measures ranged from below average to exceptionally low for verbal material with relatively better performance for visual information, falling in the  low average range. He underwent a neuropsychological evaluation under my direction on 4/14/2022 prior to surgery. As noted, results indicated prominent impairments in verbal learning and memory,while memory for visual information fell within normal limits. He was distractible. Performance otherwise fell within normal limits across cognitive domains. Compared to his July 2021 evaluation, verbal memory was generally stable, memory for visual information was stable or slightly improved, and he had less variability within cognitive domains.      CLINICAL INTERVIEW FINDINGS    Upon interview, Mr. Butt stated that his first surgery went well.  He indicated that his benefit has been somewhat sporadic.  His goal for the next surgery would be to be able to completely write his name.  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  He noted that these tasks are not better yet because he still struggles with tremor on his left side.  His daughter stated that he does well with his right hand, and he can pour liquids.  He indicated that his neurologist changed his program yesterday, and he is hoping for more consistent benefit.  He has a good understanding of the surgical procedure and stated that he did fine being awake during the surgery.  His daughter noted that it was helpful to have someone there when he woke up reminding him that he was fine.  He was not able to list any of the risks of the procedure, however.  He lives with his wife, who will be able to assist with his cares as necessary following the surgery.  He has daughters who live nearby and would also be able to assist as necessary.    Mr. Butt and his daughter agree that he has not had any changes in cognition.  He is not forgetting what others have said, has not forgotten names of familiar people, and has not become lost in familiar  places.  He is not misplacing items anymore than normal and his daughter stated that he is not repeating questions or stories.  He has not noticed difficulty with word finding, attention or concentration, or decision making.  He and his wife share the management of their finances, and he denied any difficulty with this.  He has a nurse who sets up his medications once a week, and has had no problems remembering to take them on a daily basis.  He drives without difficulty and handles his personal cares independently.    Mr. Butt described his mood as all right.  He stated that he is occasionally depressed but not more than in the past.  He has not had feelings of guilt and he has not felt anxious.  He sleeps well, 8 hours a night.  He feels rested in the morning and only occasionally naps.  His appetite has not been very strong but his weight has been stable.  His energy level is good.  His interest level is also good, although he wishes he could do woodworking.  He denied visual or auditory hallucinations.  He denied suicidal ideation or any history of suicide attempts.  He does not drink alcohol or use tobacco.    Since his last evaluation, he has not had any major illnesses or injuries.  He has never had COVID.  His balance is fair.  He will occasionally tip one way or the other when he stands up.  He has not had unilateral weakness or numbness.  He is not bothered by headaches.  He has arthritis in his finger.  He wears hearing aids but did not wear them today, as the left side has been bothering him.    Past Medical History  Past Medical History:   Diagnosis Date     Alcohol dependence (H)      Anemia      Anxiety      BPH (benign prostatic hyperplasia)      Cataract      Chronic pain      DVT (deep venous thrombosis) (H)      Dysphagia      Dysthymia      Erosive gastritis      Esophagitis      Follicular lymphoma (H)      Foot sprain      Gastroesophageal reflux disease with esophagitis      HTN  (hypertension)      Hyperlipidemia      Impacted cerumen      MCI (mild cognitive impairment)      Osteoarthritis      Presbyopia      PTSD (post-traumatic stress disorder)      Sensorineural hearing loss, bilateral      Tinnitus, bilateral      Urinary frequency        Current Medications  He has a current medication list which includes the following prescription(s): acetaminophen, carboxymethylcellulose sodium, cholecalciferol, cyproheptadine, finasteride, folic acid, gabapentin, gabapentin, mirtazapine, NONFORMULARY, oxycodone, pantoprazole, polyethylene glycol, prazosin, propylene glycol, senna-docusate sodium, sertraline, simvastatin, tamsulosin, triamcinolone, and vitamin b-12.    Family History    Significant for a father who may have had tremor.  He noted that both of his parents drank a lot of alcohol. Records note that his mother had a stroke.  He has no known family history of dementia.    BEHAVIORAL OBSERVATIONS    During the evaluation, Mr. Butt was pleasant, cooperative, and seemed to understand the instructions.  He was alert and oriented to person, place, and time.  During the rooming, he called his daughter by the wrong name but corrected himself.  Tremors were observed clinically in his right hand when he was using it.  This affected his performance on some tasks involving use of a pencil.  Mood was somewhat depressed and he made doubtful comments about his performance on tasks, particularly relating to his tremor.  Speech was fluent, with normal articulation, volume, and rate.  He presented his thoughts in a clear, logical manner.  Internal performance validity measures fell within normal limits.  The results are believed to accurately reflect his current level of functioning.    MEASURES ADMINISTERED    The following measures were administered by a trained psychometrist, under the direct supervision of a licensed psychologist.    Subtests of the Wechsler Adult Intelligence Scale-4; subtests of  the Wechsler Memory Scale-4; Lozano Verbal Learning Test-Revised, Form 3; Brief Visual Memory Test - Revised, Form 2; [place] Naming Test; D-KEFS Verbal Fluency, Alternate Form; Trail Making Test; Guzman Judgement of Line Orientation Form H; Jam-Osterrieth Complex Figure; Clock Drawing; Dementia Rating Scale - 2 (DRS-2) Alternate Form;  MoCA v. 7.1; Cuevas Depression Inventory-2 (BDI-2), Apathy Scale.     RESULTS AND INTERPRETATION    Performance on a screening measure of dementia was average (DRS-2 Total Score = 136/144).  Performance on the MoCA fell below expected (18/30).  Performance on this task was notable for errors in cube and clock drawings, at least partially due to marked tremor. Errors were also noted in digit span, repetition, reduced fluency, abstraction, and memory.    Confrontation naming was average for his age. Letter fluency and switching fluency were below average for his age. Generative naming to category was low average.    Attention span was average for his age.  Divided attention was average. Psychomotor processing speed was average.    Basic visual perception, including matching lines and angles, was exceptionally high for his age.  Construction of a clock was within normal limits, but was notable for significant tremor.     Immediate recall of verbal narrative material was below average. Recall of the learned material following a 30 minute delay was below average. Recognition memory on this task was low average.  On a multiple trial list learning task, immediate recall was low average, with below average recall following a 25-minute delay.  Recognition memory on this task was average.  Immediate recall of visual material was below average, with  average retention of the learned material following a 25-minute delay. Recognition memory on this task below average.    On the BDI-2, a self-report questionnaire, he endorsed minimal depressive symptomatology.  He endorsed minimal apathy on a scale.      IMPRESSIONS AND RECOMMENDATIONS    Arley Butt is a 72 year old man with a history of essential tremor.  He underwent left VIM DBS lead implantation on 10/4/2022.  Neuropsychological evaluations prior to that surgery were suggestive of amnestic MCI, affecting verbal memory.  Given the presence of MCI, the surgery team requested a neuropsychological evaluation prior to considering right DBS lead implantation.    Results of today's evaluation indicate impairments in learning of new information.  Memory is impaired, but he sometimes benefits from cues, suggesting retrieval difficulties.  Fluency is slowed.  Drawings and written tasks were notable for significant tremor using his right hand.  Basic language, visual processing, and attention all fell within normal limits.  He endorsed minimal depressive symptomatology and apathy.    Compared to his most recent evaluation on 10/4/2022, he has had improvements in verbal memory, particularly on recognition tasks.  Letter fluency has declined, while semantic fluency and switching fluency remains stable.  Performance otherwise remains relatively stable across cognitive domains.    This pattern of performance continues to reflect MCI; however, the findings do not appear to reflect a rapid forgetting rate, and memory has in fact improved since his October evaluation.  Taken together with prior evaluations, there is not a clear pattern of decline over time.  He continues to be capable of comprehending medical information and making well reasoned decisions for himself.  He has a good understanding of the surgical procedure and feels confident about being awake during the surgery.  Of note, he was not able to list any of the risks of the surgery, and these should be reviewed with him.  He has a good support system in his family. Based on the available information, he is an adequate candidate for DBS surgery from a neurocognitive perspective.    Results may serve as a  current baseline of his neurocognitive functioning.  It may be helpful to continue to follow him over time.  Repeated neuropsychological evaluation in 1 year may help to determine whether his cognitive difficulties progress.    Skylar Cabrera, Ph.D., [facility]  Licensed Psychologist, [id number]  Board Certified in Clinical Neuropsychology      Time spent: One unit of professional time, including interview (CPT 29450). 60 minutes (1 unit) neuropsychological testing evaluation by licensed and board-certified neuropsychologist, including integration of patient data, interpretation of standardized test results and clinical data, clinical decision-making, treatment planning, report, and interactive feedback to the patient, first hour (CPT 33231). 96 minutes (2 units) of neuropsychological testing evaluation by licensed and board-certified neuropsychologist, including integration of patient data, interpretation of standardized test results and clinical data, clinical decision-making, treatment planning, report, and interactive feedback to the patient, subsequent hours (CPT 29407). 30 minutes of neuropsychological test administration and scoring by technician, first 30 minutes (CPT 28681). 177 additional minutes (* units) neuropsychological test administration and scoring by technician, subsequent 30 minutes (CPT 31128). ICD-10 Diagnoses: G25; F06.8.

## 2023-02-08 NOTE — CONFIDENTIAL NOTE
Patient Arley Butt  [address]    MRN [id number]  Provider      50   Tech     Sex Male   Occupation     Education 11   Language     Preferred Hand Right   Station OP    Age 72                 DEMENTIA RATING SCALE - 2   TEST FORM Standard     Raw MAS       Attention 36 11       Init/Psv 36 10       Construct 6 10       Concept 35 9       Memory 23 10       Total / 144 136 10                WAIS-IV          Raw SS       Digit Span 20 8                WECHSLER MEMORY SCALE - IV         Raw SS/%ile       Info/Orientation 14        Logical Memory I 17 5       Logical Memory II 4 4       LM Recognition  16 17-25                BOSTON NAMING TEST         Score (Correct+Stim Cue)  50 MAS 8     # Correct Stimulus Cue  0 T 42.5     # Correct Phonemic Cue  3                D-KEFS VERBAL FLUENCY    TEST FORM Alternate     Raw SS       Letter Fluency 15 4       Category Fluency 25 7       Switching Fluency             Total Correct 7 4       Switching Accuracy 8 7                CLOCK DRAWING         Command 3 /3 Rating       Copy 2 /3 Rating      TRAIL MAKING TEST          Seconds Errors MAS z     Trails A 45 1 10 -0.25     Trails B 180 4 8 -0.95              GUZMAN JUDGMENT OF LINE ORIENTATION  TEST FORM H    Raw Score 29 Raw Correction 32     MAS 16 Guzman %ile 86              HVLT-R    TEST FORM 3     Raw T       Trial 1 4        Trial 2 6        Trial 3 7        Total 1-3 17 37       Learning 3        Delay 4 33       Percent Retained 57% 35       True Positives 12        Discrimination Index 10 44       False Positives 2                 BRIEF VISUAL MEMORY TEST - REVISED  TEST FORM 2     Raw T       Trial 1 4        Trial 2 4        Trial 3 3        Total 1-3 11 33       Learning 0 30       Delay 3 29       Percent Retained 75% >16 %ile      Recognition Hits 3        Discrimination Index 1 <1 %ile      False Alarms 2                 MAGGIE COGNITVE ASSESSMENT (MOCA)  TEST FORM 7.1    Score 18 /30                 BDI         Score 3        Interpretation Minimal                 APATHY SCALE         Score 12

## 2023-02-13 ENCOUNTER — TELEPHONE (OUTPATIENT)
Dept: NEUROLOGY | Facility: CLINIC | Age: 73
End: 2023-02-13

## 2023-02-13 NOTE — TELEPHONE ENCOUNTER
Deep Brain Stimulation Surgery Surgical Candidacy Form    Referring Provider: n/a   Patient Information: Lives in [place], [place]  Name: Arley Butt  YOB: 1950  Age: 72 year old  Height: n/a  Weight: 205 lbs  BMI: n/a  Blood Pressure: 137/87  Diagnosis: Essential tremor s/p L VIM   Age of Onset of Symptoms: 67. Year of Onset of Symptoms: .  Age of Diagnosis: 70. Year of Diagnosis: .  Handedness: Right.  Side of Onset: Right upper extremity.   Disease Features: Tremor     Surgery Goals: Decrease tremor. Improve ability to do woodworking (holds ruler in left hand) and easier dressing     Medications: As os 23:  Current Outpatient Medications   Medication Sig Dispense Refill     acetaminophen (TYLENOL) 500 MG tablet Take 500-1,000 mg by mouth every 8 hours as needed for mild pain       Carboxymethylcellulose Sodium 1 % GEL Apply 1 drop to eye nightly as needed       cholecalciferol 50 MCG ( UT) tablet TAKE ONE TABLET BY MOUTH DAILY FOR VITAMIN D SUPPLEMENT       cyproheptadine (PERIACTIN) 4 MG tablet Take 12 mg by mouth At Bedtime       finasteride (PROSCAR) 5 MG tablet Take 1 tablet by mouth daily       folic acid (FOLVITE) 1 MG tablet Take 1 tablet by mouth daily       gabapentin (NEURONTIN) 300 MG capsule Patient reports taking 2 x 300 mg tid + 3 x 300 mg at bedtime ([facility] .10.18)    Takes 300 mg capsule and 600 mg tablet (see other order as well)   Dosing as follows:    AM:  300 mg + 600 mg = 900 mg total  Afternoon: 300 mg + 600 mg = 900 mg total  Evenin mg x2 + 600 mg = 1200 mg total  Bedtime: 300 mg x3 + 600 mg = 1200 mg total       gabapentin (NEURONTIN) 600 MG tablet Takes 300 mg capsule and 600 mg tablet (see other order as well)   Dosing as follows:    AM:  300 mg + 600 mg = 900 mg total  Afternoon: 300 mg + 600 mg = 900 mg total  Evenin mg x2 + 600 mg = 1200 mg total  Bedtime: 300 mg x3 + 600 mg = 1200 mg  total (Patient not taking: Reported on 12/13/2022)       mirtazapine (REMERON) 30 MG tablet Take 15 mg by mouth daily       NONFORMULARY Nocturnal CPAP with 2L oxygen       oxyCODONE (ROXICODONE) 5 MG tablet Take 1 tablet (5 mg) by mouth every 6 hours as needed for pain (Patient not taking: Reported on 10/18/2022) 12 tablet 0     pantoprazole (PROTONIX) 40 MG EC tablet Take 40 mg by mouth 2 times daily       polyethylene glycol (MIRALAX) 17 g packet Take 17 g by mouth daily 30 packet 0     prazosin (MINIPRESS) 5 MG capsule Take 10 mg by mouth At Bedtime       propylene glycol (SYSTANE BALANCE) 0.6 % SOLN ophthalmic solution Apply 1 drop to eye 4 times daily as needed       SENNA-docusate sodium (SENNA S) 8.6-50 MG tablet Take 2 tablets by mouth 2 times daily (Patient not taking: Reported on 1/30/2023) 60 tablet 0     sertraline (ZOLOFT) 100 MG tablet Take 150 mg by mouth daily       simvastatin (ZOCOR) 40 MG tablet Take 0.5 tablets by mouth At Bedtime       tamsulosin (FLOMAX) 0.4 MG capsule Take 1 capsule by mouth daily (Patient not taking: Reported on 1/30/2023)       triamcinolone (KENALOG) 0.1 % external cream Apply 1 Film topically daily as needed (Patient not taking: Reported on 1/30/2023)       vitamin B-12 (CYANOCOBALAMIN) 1000 MCG tablet Take 1 tablet by mouth daily         Motor Assessment:  TETRAS: 13.5    Tremor Motor Scale 1/30/2023 1/30/2023   Assessment Time 10:19 AM 11:04 AM   Medication On On   DBS - Right Brain None None   DBS - Left Brain On Off   Head 0 0   Face & Jaw 0 0   Voice 0 0   Outstretched - RIGHT 0.5 0.5   Outstretched - LEFT 1 1   Wingbeating - RIGHT 0.5 0.5   Wingbeating - LEFT 0.5 0.5   Kinetic - RIGHT 0 1.5   Kinetic - LEFT 2 1.5   Lower Limb - RIGHT 0 0   Lower Limb - LEFT 0 0   Lower Limb (Max) 0 0   Spiral - RIGHT 1 1   Spiral - LEFT 1 1   Handwriting 4 4   Dot approx - RIGHT 1.5 1.5   Dot approx - LEFT 0.5 0.5   Trunk (Standing) 0 0   Total Right 3.5 5   Total Left 5 4.5   Axial  0 0   TOTAL 12.5 13.5          Neuropsychological Evaluation:    Cognitive Issues: Results indicate impairments in learning of new information. He has difficulty retrieving learned information. Information processing speed is slowed. Compared to an evaluation on 10/4/2022, he has had improvements in verbal memory, particularly on recognition tasks. Fluency has been variable, and performance otherwise remains stable across cognitive domains. The findings continue to reflect MCI; however, there is not a clear pattern of decline over time. He appears to be an adequate candidate for surgery from a neurocognitive perspective.    Psychiatric Issues: He does not report significant emotional problems that might interfere with his judgment or ability to follow through with treatment recommendations. He does not drink alcohol. He has a good support system in his family.     Depression: No depression.    Cognitive Function: Mild memory difficulties or frontal deficits. (MCI, stable)    Psychosis: No hallucinations.     MRI Date: 3/17/2022    Impression:   1. No acute intracranial pathology.  2. Mild Leukoaraiosis.    CT head: 10/24/22    Impression:  Left frontal approach deep brain stimulator electrode is unchanged in  position..      PMH: -  Past Medical History:   Diagnosis Date     Alcohol dependence (H)      Anemia      Anxiety      BPH (benign prostatic hyperplasia)      Cataract      Chronic pain      DVT (deep venous thrombosis) (H)      Dysphagia      Dysthymia      Erosive gastritis      Esophagitis      Follicular lymphoma (H)      Foot sprain      Gastroesophageal reflux disease with esophagitis      HTN (hypertension)      Hyperlipidemia      Impacted cerumen      MCI (mild cognitive impairment)      Osteoarthritis      Presbyopia      PTSD (post-traumatic stress disorder)      Sensorineural hearing loss, bilateral      Tinnitus, bilateral      Urinary frequency      PSH  Past Surgical History:   Procedure  Laterality Date     ANKLE SURGERY Right      BACK SURGERY      lower back, herniated disc     CYSTECTOMY      pilonidal     IMPLANT DEEP BRAIN STIMULATION GENERATOR / BATTERY Left 10/4/2022    Procedure: Deep brain stimulator placement, phase II, placement of deep brain stimulator generator/battery over the left chest wall;  Surgeon: Manuel Otero MD;  Location: [facility] OR     OPTICAL TRACKING SYSTEM INSERTION DEEP BRAIN STIMULATION Left 9/26/2022    Procedure: stealth assisted Left side deep brain stimulator placement, phase I, placement of left side deep brain stimulator electrode, target left ventral intermediate nucleus of the thalamus, with microelectrode recording;  Surgeon: Manuel Otero MD;  Location: [facility] OR     VASECTOMY       Soc Hx: , retired     Family Hx of Movement Disorders: father with tremor    Consult Moon Plummer/Shanna   Patient discussed at conference on: 2/23/23     DBS Meeting Notes/Further Work-up Needed: -    Authorization to discuss Protected Health Information:  Yes, daughters, Svitlana and Leslie  Healthcare Directive:  Yes, 1) Leslie 2) Svitlana  Mych[employer] use:  Uses reliably    From Consensus 2/23/23     1. Candidate - yes  2. The plan for surgery - 2nd side RVIM  3. The  - connect to existing BSci  4. The motor symptoms we are treating include - tremor  5. Patient goal: Decrease tremor. Improve ability to do woodworking (holds ruler in left hand)  and easier dressing     Do we have all the imaging sequences needed?  Yes per Dr. Arias     Other indication for surgery: no     Brain MRI findings: Moderate atrophy     6. Epic Care Companion - sent MyChart  7. Research - sent MyChart

## 2023-02-28 ENCOUNTER — TELEPHONE (OUTPATIENT)
Dept: NEUROLOGY | Facility: CLINIC | Age: 73
End: 2023-02-28

## 2023-02-28 NOTE — TELEPHONE ENCOUNTER
From Consensus 2/23/23    1. Candidate - yes  2. The plan for surgery - 2nd side RVIM  3. The  - connect to existing BSci  4. The motor symptoms we are treating include - tremor  5. Patient goal: Decrease tremor. Improve ability to do woodworking (holds ruler in left hand)  and easier dressing    Do we have all the imaging sequences needed?  Yes per Dr. Arias    Other indication for surgery: no    Brain MRI findings: Moderate atrophy    6. Epic Care Companion - sent MyChart  7. Research - sent MyChart    Authorization to discuss Protected Health Information: Yes, daughters, Svitlana and Leslie  Healthcare Directive: Yes, 1) Leslie 2) Svitlana  [employer] use: Uses reliably  -----------------------------  Spoke to Svitlana, she hoped that she did not make me think she was overwhelmed. Family is behind him having 2nd side.  MyChart sent to patient with the plan and questions regarding research and [facility] Care Companion.

## 2023-03-01 DIAGNOSIS — G25.0 ESSENTIAL TREMOR: Primary | ICD-10-CM

## 2023-03-07 ENCOUNTER — TRANSFERRED RECORDS (OUTPATIENT)
Dept: HEALTH INFORMATION MANAGEMENT | Facility: CLINIC | Age: 73
End: 2023-03-07

## 2023-03-07 LAB
ALT SERPL-CCNC: 32 U/L (ref 0–55)
AST SERPL-CCNC: 21 U/L (ref 5–40)
CHOLESTEROL (EXTERNAL): 164 MG/DL
CREATININE (EXTERNAL): 1.1 MG/DL (ref 0.5–1.5)
GFR ESTIMATED (EXTERNAL): 75 ML/MIN/1.73M2
GLUCOSE (EXTERNAL): 106 MG/DL (ref 70–100)
HDLC SERPL-MCNC: 33 MG/DL
LDL CHOLESTEROL CALCULATED (EXTERNAL): 92 MG/DL
POTASSIUM (EXTERNAL): 4 MMOL/L (ref 3.5–5)
TRIGLYCERIDES (EXTERNAL): 194 MG/DL

## 2023-03-21 ENCOUNTER — TELEPHONE (OUTPATIENT)
Dept: NEUROSURGERY | Facility: CLINIC | Age: 73
End: 2023-03-21

## 2023-03-21 DIAGNOSIS — G25.0 ESSENTIAL TREMOR: Primary | ICD-10-CM

## 2023-03-21 NOTE — TELEPHONE ENCOUNTER
I called the patient in regards to scheduling surgery with Dr. Otero. Patient does not need a covid test unless requested by provider and pre op has been taken care of with PAC in person. Patient is aware that the nursing team will be reaching out within the next few days. I did tell patient that a nurse will reach out within 2-3 days prior to surgery with arrival time and instructions.    Surgeon: Dr. Otero  Date of Surgery: 4/11/2023  Location of surgery: [facility]  Pre-Op H&P: 4/4/2023  Post-Op Appt Date: 4/24/2023   Imaging needed:  Yes  Discussed COVID-19 testing:  Yes  Pre-cert/Authorization completed:  Yes  Patient aware that pre-op RN will call 2-3 days prior to surgery with arrival time and instructions Yes           Cornelio Palmer on 3/21/2023 at 3:39 PM

## 2023-03-21 NOTE — TELEPHONE ENCOUNTER
FUTURE VISIT INFORMATION      SURGERY INFORMATION:    Date: 4/11/2023    Location: [facility]    Surgeon:  Manuel Otero MD    Anesthesia Type:  MAC with Local    Procedure: stealth assisted Right side deep brain stimulator placement, phase I & II combined, placement of right side deep brain stimulator electrode, target right ventral intermediate nucleus of the thalamus, with microelectrode recording and connection to existing generator/battery    Consult: 2/8/22    RECORDS REQUESTED FROM:       Primary Care Provider: [facility]     Most recent Cardiac Stress Test: 8/25/22 - [facility]      Records Requested     March 21, 2023 4:01 PM  [id number]   Facility  [facility] medical records   Ph. [phone]  Fx. [phone]   Outcome Sent a fax for records and most recent tests     3/22/23 11:44AM recs received sent to scan - [facility]     3/7/23 LABS   3/7/23 Primary care note   6/14/22  PFT  8/4/22 EKG tracings   11/5/15 ECHO report

## 2023-03-29 ENCOUNTER — TELEPHONE (OUTPATIENT)
Dept: NEUROLOGY | Facility: CLINIC | Age: 73
End: 2023-03-29

## 2023-03-29 ENCOUNTER — OFFICE VISIT (OUTPATIENT)
Dept: NEUROLOGY | Facility: CLINIC | Age: 73
End: 2023-03-29
Payer: COMMERCIAL

## 2023-03-29 VITALS — DIASTOLIC BLOOD PRESSURE: 63 MMHG | SYSTOLIC BLOOD PRESSURE: 114 MMHG | HEART RATE: 69 BPM | OXYGEN SATURATION: 95 %

## 2023-03-29 DIAGNOSIS — G25.0 ESSENTIAL TREMOR: Primary | ICD-10-CM

## 2023-03-29 DIAGNOSIS — Z96.89 S/P DEEP BRAIN STIMULATOR PLACEMENT: ICD-10-CM

## 2023-03-29 PROCEDURE — 99213 OFFICE O/P EST LOW 20 MIN: CPT | Mod: 25 | Performed by: STUDENT IN AN ORGANIZED HEALTH CARE EDUCATION/TRAINING PROGRAM

## 2023-03-29 PROCEDURE — 95983 ALYS BRN NPGT PRGRMG 15 MIN: CPT | Performed by: STUDENT IN AN ORGANIZED HEALTH CARE EDUCATION/TRAINING PROGRAM

## 2023-03-29 PROCEDURE — 95984 ALYS BRN NPGT PRGRMG ADDL 15: CPT | Performed by: STUDENT IN AN ORGANIZED HEALTH CARE EDUCATION/TRAINING PROGRAM

## 2023-03-29 ASSESSMENT — ACTIVITIES OF DAILY LIVING (ADL)
WRITING: (3) MODERATELY ABNORMAL. CANNOT WRITE WITHOUT USING STRATEGIES SUCH AS HOLDING THE WRITING HAND WITH THE OTHER HAND, HOLDING PEN DIFFERENTLY OR USING LARGE PEN.
OVERALL_DISABILITY_WITH_THE_MOST_AFFECTED_TASK: (3) MODERATELY ABNORMAL. CAN DO TASK BUT MUST USE STRATEGIES.
SPEAKING: (0) NORMAL
DRINKING_FROM_A_GLASS: (3) MODERATELY ABNORMAL. SPILLS A LOT OR CHANGES STRATEGY TO COMPLETE TASK SUCH AS USING TWO HANDS OR LEANING OVER.
CARRYING_FOOD_TRAYS_PLATES_OR_SIMILAR_ITEMS: (3) MODERATELY ABNORMAL. USES STRATEGIES SUCH AS HOLDING TIGHTLY AGAINST BODY TO CARRY.
SOCIAL_IMPACT: (1) AWARE OF TREMOR, BUT IT DOES NOT AFFECT LIFESTYLE OR PROFESSIONAL LIFE.
DRESS: (0) NORMAL
OVERALL_DISABILITY_WITH_THE_MOST_AFFECTED_TASK: WRITING
FEEDING_WITH_A_SPOON: (3) MODERATELY ABNORMAL. SPILLS A LOT OR CHANGES STRATEGY TO COMPLETE TASK SUCH AS USING TWO HANDS OR LEANING OVER.
WORKING: (3) MODERATELY ABNORMAL. UNABLE TO CONTINUE WORKING WITHOUT USING STRATEGIES SUCH AS CHANGING JOBS OR USING SPECIAL EQUIPMENT.
USING_KEYS: (3) MODERATELY ABNORMAL. NEEDS TO USE TWO HANDS OR OTHER STRATEGIES TO PUT KEY IN LOCK.
HYGIENE: (0) NORMAL
TOTAL_SCORE: 25
POURING: (3) MODERATELY ABNORMAL. MUST USE TWO HANDS OR USES OTHER STRATEGIES TO AVOID SPILLING.

## 2023-03-29 NOTE — PATIENT INSTRUCTIONS
You are now on Program 1.  This is a new program and was the best that we found today.  It is at 3.0 (range 0-3.0).    I made new programs 3 and 4 as backup.  These were also good for your writing.    Program 2 is your old settings

## 2023-03-29 NOTE — PROGRESS NOTES
Department of Neurology  Movement Disorders Division   DBS Follow-up Note    Patient: Arley Butt  MRN: [id number]   : 1950   Date of Visit: 3/29/2023    Diagnosis: Essential tremor  DBS Target(s): Left VIM  Date(s) of DBS Lead Placement: 2022  Date(s) of IPG Placement: L chest 10/4/2022  Device: Standard [employer]      Chief Complaint:  Arley Butt is a 72 year old male who returns to clinic for follow up of ET status post left VIM DBS.       Interval History:  Writing continues to vary throughout the day.  Based on samples, seem to be best in the afternoon.  Slight tremor while eating when he brings food to his mouth.       Tremor ADL Scale  1. Speaking 0 (0) Normal   2. Feeding with a spoon 3 (3) Moderately abnormal. Spills a lot or changes strategy to complete task such as using two hands or leaning over.   3. Drinking from a glass 3 (3) Moderately abnormal. Spills a lot or changes strategy to complete task such as using two hands or leaning over.   4. Hygiene 0 (0) Normal   5. Dressing 0 (0) Normal   6. Pouring 3 (3) Moderately abnormal. Must use two hands or uses other strategies to avoid spilling.   7. Carrying food trays, plates or similar items 3 (3) Moderately abnormal. Uses strategies such as holding tightly against body to carry.   8. Using keys 3 (3) Moderately abnormal. Needs to use two hands or other strategies to put key in lock.   9. Writing 3 (3) Moderately abnormal. Cannot write without using strategies such as holding the writing hand with the other hand, holding pen differently or using large pen.   10. Working 3 (3) Moderately abnormal. Unable to continue working without using strategies such as changing jobs or using special equipment.   11. Overall disability with the most affected task 3 (3) Moderately abnormal. Can do task but must use strategies.   Name of most affected task Writing Writing   12. Social impact 1 (1) Aware of tremor, but it does not affect lifestyle or  professional life.   ADL TOTAL 25    Prior: 25 on 2023      Related Medications           Gabapentin 300mg 2 1 2 3   Last taken: 7:30am      Medications:  Current Outpatient Medications   Medication Sig Dispense Refill     Carboxymethylcellulose Sodium 1 % GEL Apply 1 drop to eye nightly as needed       cholecalciferol 50 MCG ( UT) tablet TAKE ONE TABLET BY MOUTH DAILY FOR VITAMIN D SUPPLEMENT       cyproheptadine (PERIACTIN) 4 MG tablet Take 12 mg by mouth At Bedtime       finasteride (PROSCAR) 5 MG tablet Take 1 tablet by mouth daily       folic acid (FOLVITE) 1 MG tablet Take 1 tablet by mouth daily       gabapentin (NEURONTIN) 300 MG capsule Patient reports taking 2 x 300 mg tid + 3 x 300 mg at bedtime ([facility] [place] 2.10.18[place]    Takes 300 mg capsule and 600 mg tablet (see other order as well)   Dosing as follows:    AM:  300 mg + 600 mg = 900 mg total  Afternoon: 300 mg + 600 mg = 900 mg total  Evenin mg x2 + 600 mg = 1200 mg total  Bedtime: 300 mg x3 + 600 mg = 1200 mg total       mirtazapine (REMERON) 30 MG tablet Take 15 mg by mouth daily       NONFORMULARY Nocturnal CPAP with 2L oxygen       pantoprazole (PROTONIX) 40 MG EC tablet Take 40 mg by mouth 2 times daily       prazosin (MINIPRESS) 5 MG capsule Take 10 mg by mouth At Bedtime       propylene glycol (SYSTANE BALANCE) 0.6 % SOLN ophthalmic solution Apply 1 drop to eye 4 times daily as needed       sertraline (ZOLOFT) 100 MG tablet Take 150 mg by mouth daily       simvastatin (ZOCOR) 40 MG tablet Take 0.5 tablets by mouth At Bedtime       tamsulosin (FLOMAX) 0.4 MG capsule Take 1 capsule by mouth daily       vitamin B-12 (CYANOCOBALAMIN) 1000 MCG tablet Take 1 tablet by mouth daily       acetaminophen (TYLENOL) 500 MG tablet Take 500-1,000 mg by mouth every 8 hours as needed for mild pain (Patient not taking: Reported on 3/29/2023)       gabapentin (NEURONTIN) 600 MG tablet Takes 300 mg capsule and 600 mg tablet (see other order as well)    Dosing as follows:    AM:  300 mg + 600 mg = 900 mg total  Afternoon: 300 mg + 600 mg = 900 mg total  Evenin mg x2 + 600 mg = 1200 mg total  Bedtime: 300 mg x3 + 600 mg = 1200 mg total (Patient not taking: Reported on 2022)       oxyCODONE (ROXICODONE) 5 MG tablet Take 1 tablet (5 mg) by mouth every 6 hours as needed for pain (Patient not taking: Reported on 3/29/2023) 12 tablet 0     polyethylene glycol (MIRALAX) 17 g packet Take 17 g by mouth daily (Patient not taking: Reported on 3/29/2023) 30 packet 0     SENNA-docusate sodium (SENNA S) 8.6-50 MG tablet Take 2 tablets by mouth 2 times daily (Patient not taking: Reported on 2023) 60 tablet 0     triamcinolone (KENALOG) 0.1 % external cream Apply 1 Film topically daily as needed (Patient not taking: Reported on 3/29/2023)          Allergies: has No Known Allergies.    Physical Exam:  The patient's  blood pressure is 114/63 and his pulse is 69. His oxygen saturation is 95%.       Neurological Examination:   Mild decrease in right arm swing   3/29/2023   Tremor Motor Scale    Assessment Time 1:10 PM    Medication On    DBS - Right Brain None    DBS - Left Brain On    Head 0    Face & Jaw 0    Voice 0    Outstretched - RIGHT 0.5    Outstretched - LEFT 1    Wingbeating - RIGHT 1    Wingbeating - LEFT 1    Kinetic - RIGHT 1.5    Kinetic - LEFT 1.5    Lower Limb - RIGHT 0    Lower Limb - LEFT 0    Lower Limb (Max) 0    Spiral - RIGHT 1.5    Spiral - LEFT 2.5    Handwriting 4    Dot approx - RIGHT 1.5    Dot approx - LEFT 1    Trunk (Standing) 0    Total Right 6    Total Left 7    Axial 0    TOTAL 17    Prior: 12.5 on 2023        Procedure: DBS Interrogation & Programming  Lead(s):    Left   DBS Target VIM   DBS Lead Type Cartesia   Lead Implant Date 2022      IPG(s):    1   IPG Genus R16   IPG Implant Date 10/4/2022   Location Left chest   Battery (V) /     Impedance Check: No problems found.  See scanned report for impedance  details.    Program 1 Left Brain             Initial Final     Inactive Active   Amplitude (mA) 4.5 [0-4.5]  4.0 [0-4.0] 3.0 [0-3.0]   Pulse width (usec) 50 60   Freq (Hz) 149  104 130   Contacts: C+L4-  C+L3- C+L3_5,7-      Program 2 Left Brain             Initial Final     Active Inactive   Amplitude /mA) 3.2 [0-3.5] 3.2 [0-3.5]   Pulse width (usec) 60 60   Freq (Hz) 204 204   Contacts: C+L4- C+L4-      Program 3 Left Brain             Initial Final     Inactive Inactive   Amplitude /mA) 3.0 [0-3.0] 2.5 [0-2.5]   Pulse width (usec) 60 60   Freq (Hz) 204 130   Contacts: C+L3L4-  (50/50) C+L3_6,7-      Program 4 Left Brain             Initial Final     Inactive Inactive   Amplitude (mA) 6.5 [0-6.5]  4.5 [0-4.5] 3.5 [0-3.5]   Pulse width (usec) 40 60   Freq (Hz) 139  113 130   Contacts: C+L4-  C+L3- C+L3_5-       LVIM:  Contacts Amplitude PW Frequency Effect SE   C+L3_5- 1.0 60 130 Action tremor improved, spiral improved, handwriting same NN    2.0   Spiral improved, handwriting same NN    3.0   Spiral same, handwriting improved     4.0    RUE stiff    3.5    resolved   C+L3_6- 1.0 60 130 Spiral improved NN    2.0   handwriting improved NN    3.0    Right face and arm tight    2.5    resolved   C+L3_7- 1.0 60 130 Spiral improved, handwriting same NN    2.0   Handwriting improved NN    3.0    RUE tight    2.5    Resolved   C+L3_5,7- 2.0 60 130 Spiral better, handwriting same NN    4.0    Rue tight    3.5    Improving    3.0   Spiral improved Resolved   C+L3_5,6- 3.0 60 130  LUE tight    2.5   Spiral same, handwriting slightly improved resolved   C+L3_6,7- 3.0 60 130  RUE tight    2.5   Spiral improved, handwriting improved Resolved           Assessment/Plan:  Arley Butt is a 72 year old male with ET s/p left VIM DBS who returns for follow-up.  Today I did directional programming of L3 which resulted in good tremor control.    - Went home on program 1   - Programs 3 and 4 were next best      Kaylynn Plummer,  MD   of Neurology  Movement Disorders Division       20 minutes spent on the date of the encounter doing chart review, history and exam, documentation and further activities as noted above.     Additional time spent for separate DBS programmin min DBS analyzed with reprogramming.

## 2023-03-29 NOTE — LETTER
3/29/2023       RE: Arley Butt  [address] [place] [address]     Dear Colleague,    Thank you for referring your patient, Arley Butt, to the [facility] [place] [facility]. Please see a copy of my visit note below.    Department of Neurology  Movement Disorders Division   DBS Follow-up Note    Patient: Arley Butt  MRN: [id number]   : 1950   Date of Visit: 3/29/2023    Diagnosis: Essential tremor  DBS Target(s): Left VIM  Date(s) of DBS Lead Placement: 2022  Date(s) of IPG Placement: L chest 10/4/2022  Device: [employer]      Chief Complaint:  Arley Butt is a 72 year old male who returns to clinic for follow up of ET status post left VIM DBS.       Interval History:  Writing continues to vary throughout the day.  Based on samples, seem to be best in the afternoon.  Slight tremor while eating when he brings food to his mouth.       Tremor ADL Scale  1. Speaking 0 (0) Normal   2. Feeding with a spoon 3 (3) Moderately abnormal. Spills a lot or changes strategy to complete task such as using two hands or leaning over.   3. Drinking from a glass 3 (3) Moderately abnormal. Spills a lot or changes strategy to complete task such as using two hands or leaning over.   4. Hygiene 0 (0) Normal   5. Dressing 0 (0) Normal   6. Pouring 3 (3) Moderately abnormal. Must use two hands or uses other strategies to avoid spilling.   7. Carrying food trays, plates or similar items 3 (3) Moderately abnormal. Uses strategies such as holding tightly against body to carry.   8. Using keys 3 (3) Moderately abnormal. Needs to use two hands or other strategies to put key in lock.   9. Writing 3 (3) Moderately abnormal. Cannot write without using strategies such as holding the writing hand with the other hand, holding pen differently or using large pen.   10. Working 3 (3) Moderately abnormal. Unable to continue working without  using strategies such as changing jobs or using special equipment.   11. Overall disability with the most affected task 3 (3) Moderately abnormal. Can do task but must use strategies.   Name of most affected task Writing Writing   12. Social impact 1 (1) Aware of tremor, but it does not affect lifestyle or professional life.   ADL TOTAL 25    Prior: 25 on 2023      Related Medications           Gabapentin 300mg 2 1 2 3   Last taken: 7:30am      Medications:  Current Outpatient Medications   Medication Sig Dispense Refill    Carboxymethylcellulose Sodium 1 % GEL Apply 1 drop to eye nightly as needed      cholecalciferol 50 MCG ( UT) tablet TAKE ONE TABLET BY MOUTH DAILY FOR VITAMIN D SUPPLEMENT      cyproheptadine (PERIACTIN) 4 MG tablet Take 12 mg by mouth At Bedtime      finasteride (PROSCAR) 5 MG tablet Take 1 tablet by mouth daily      folic acid (FOLVITE) 1 MG tablet Take 1 tablet by mouth daily      gabapentin (NEURONTIN) 300 MG capsule Patient reports taking 2 x 300 mg tid + 3 x 300 mg at bedtime ([facility] [place] .10.18)    Takes 300 mg capsule and 600 mg tablet (see other order as well)   Dosing as follows:    AM:  300 mg + 600 mg = 900 mg total  Afternoon: 300 mg + 600 mg = 900 mg total  Evenin mg x2 + 600 mg = 1200 mg total  Bedtime: 300 mg x3 + 600 mg = 1200 mg total      mirtazapine (REMERON) 30 MG tablet Take 15 mg by mouth daily      NONFORMULARY Nocturnal CPAP with 2L oxygen      pantoprazole (PROTONIX) 40 MG EC tablet Take 40 mg by mouth 2 times daily      prazosin (MINIPRESS) 5 MG capsule Take 10 mg by mouth At Bedtime      propylene glycol (SYSTANE BALANCE) 0.6 % SOLN ophthalmic solution Apply 1 drop to eye 4 times daily as needed      sertraline (ZOLOFT) 100 MG tablet Take 150 mg by mouth daily      simvastatin (ZOCOR) 40 MG tablet Take 0.5 tablets by mouth At Bedtime      tamsulosin (FLOMAX) 0.4 MG capsule Take 1 capsule by mouth daily      vitamin B-12 (CYANOCOBALAMIN) 1000 MCG tablet Take  1 tablet by mouth daily      acetaminophen (TYLENOL) 500 MG tablet Take 500-1,000 mg by mouth every 8 hours as needed for mild pain (Patient not taking: Reported on 3/29/2023)      gabapentin (NEURONTIN) 600 MG tablet Takes 300 mg capsule and 600 mg tablet (see other order as well)   Dosing as follows:    AM:  300 mg + 600 mg = 900 mg total  Afternoon: 300 mg + 600 mg = 900 mg total  Evenin mg x2 + 600 mg = 1200 mg total  Bedtime: 300 mg x3 + 600 mg = 1200 mg total (Patient not taking: Reported on 2022)      oxyCODONE (ROXICODONE) 5 MG tablet Take 1 tablet (5 mg) by mouth every 6 hours as needed for pain (Patient not taking: Reported on 3/29/2023) 12 tablet 0    polyethylene glycol (MIRALAX) 17 g packet Take 17 g by mouth daily (Patient not taking: Reported on 3/29/2023) 30 packet 0    SENNA-docusate sodium (SENNA S) 8.6-50 MG tablet Take 2 tablets by mouth 2 times daily (Patient not taking: Reported on 2023) 60 tablet 0    triamcinolone (KENALOG) 0.1 % external cream Apply 1 Film topically daily as needed (Patient not taking: Reported on 3/29/2023)          Allergies: has No Known Allergies.    Physical Exam:  The patient's  blood pressure is 114/63 and his pulse is 69. His oxygen saturation is 95%.       Neurological Examination:   Mild decrease in right arm swing   3/29/2023   Tremor Motor Scale    Assessment Time 1:10 PM    Medication On    DBS - Right Brain None    DBS - Left Brain On    Head 0    Face & Jaw 0    Voice 0    Outstretched - RIGHT 0.5    Outstretched - LEFT 1    Wingbeating - RIGHT 1    Wingbeating - LEFT 1    Kinetic - RIGHT 1.5    Kinetic - LEFT 1.5    Lower Limb - RIGHT 0    Lower Limb - LEFT 0    Lower Limb (Max) 0    Spiral - RIGHT 1.5    Spiral - LEFT 2.5    Handwriting 4    Dot approx - RIGHT 1.5    Dot approx - LEFT 1    Trunk (Standing) 0    Total Right 6    Total Left 7    Axial 0    TOTAL 17    Prior: 12.5 on 2023        Procedure: DBS Interrogation &  Programming  Lead(s):    Left   DBS Target VIM   DBS Lead Type Cartesia   Lead Implant Date 9/26/2022      IPG(s):    1   IPG Genus R16   IPG Implant Date 10/4/2022   Location Left chest   Battery (V) /     Impedance Check: No problems found.  See scanned report for impedance details.    Program 1 Left Brain             Initial Final     Inactive Active   Amplitude (mA) 4.5 [0-4.5]  4.0 [0-4.0] 3.0 [0-3.0]   Pulse width (usec) 50 60   Freq (Hz) 149  104 130   Contacts: C+L4-  C+L3- C+L3_5,7-      Program 2 Left Brain             Initial Final     Active Inactive   Amplitude /mA) 3.2 [0-3.5] 3.2 [0-3.5]   Pulse width (usec) 60 60   Freq (Hz) 204 204   Contacts: C+L4- C+L4-      Program 3 Left Brain             Initial Final     Inactive Inactive   Amplitude /mA) 3.0 [0-3.0] 2.5 [0-2.5]   Pulse width (usec) 60 60   Freq (Hz) 204 130   Contacts: C+L3L4-  (50/50) C+L3_6,7-      Program 4 Left Brain             Initial Final     Inactive Inactive   Amplitude (mA) 6.5 [0-6.5]  4.5 [0-4.5] 3.5 [0-3.5]   Pulse width (usec) 40 60   Freq (Hz) 139  113 130   Contacts: C+L4-  C+L3- C+L3_5-       LVIM:  Contacts Amplitude PW Frequency Effect SE   C+L3_5- 1.0 60 130 Action tremor improved, spiral improved, handwriting same NN    2.0   Spiral improved, handwriting same NN    3.0   Spiral same, handwriting improved     4.0    RUE stiff    3.5    resolved   C+L3_6- 1.0 60 130 Spiral improved NN    2.0   handwriting improved NN    3.0    Right face and arm tight    2.5    resolved   C+L3_7- 1.0 60 130 Spiral improved, handwriting same NN    2.0   Handwriting improved NN    3.0    RUE tight    2.5    Resolved   C+L3_5,7- 2.0 60 130 Spiral better, handwriting same NN    4.0    Rue tight    3.5    Improving    3.0   Spiral improved Resolved   C+L3_5,6- 3.0 60 130  LUE tight    2.5   Spiral same, handwriting slightly improved resolved   C+L3_6,7- 3.0 60 130  RUE tight    2.5   Spiral improved, handwriting improved Resolved            Assessment/Plan:  Arley Butt is a 72 year old male with ET s/p left VIM DBS who returns for follow-up.  Today I did directional programming of L3 which resulted in good tremor control.    - Went home on program 1   - Programs 3 and 4 were next best      Kaylynn Plummer MD   of Neurology  Movement Disorders Division       20 minutes spent on the date of the encounter doing chart review, history and exam, documentation and further activities as noted above.     Additional time spent for separate DBS programmin min DBS analyzed with reprogramming.

## 2023-03-29 NOTE — TELEPHONE ENCOUNTER
Initial programming check list:    CT head ordered and scheduled x  Neuropsych order placed for 1 year follow up x  Letter composed x  Datebase updated x  Appointment held x  [facility] Companion ordered x    The patient was informed of their initial programming appointments on 5/17/23 and was reminded to bring their patient  fully charged if it's a rechargeable. The patient was also informed they will be repeating the neuropsychological evaluation, 1 year after their DBS surgery.

## 2023-04-03 RX ORDER — SIMVASTATIN 20 MG
20 TABLET ORAL AT BEDTIME
COMMUNITY

## 2023-04-03 NOTE — PROGRESS NOTES
Preoperative Assessment Center Medication History Note    Medication history completed on April 3, 2023 by this writer. See Epic admission navigator for prior to admission medications. Operating room staff will still need to confirm medications and last dose information on day of surgery.     Medication history interview sources  Patient interview: Yes with charles Shane (consent on file)  Care Everywhere records: Yes  Surescripts pharmacy refill records: No  Other (if applicable):     Changes made to PTA medication list  Added: tylenol PM. White petrolatum opth ointment.   Deleted: oxycodone (completed), senna-docusate.   Changed: gabapentin dose/sig, sertraline dosage form, simvastatin dosage form, carboxymethylcellulose gel.     Additional medication history information (including reliability of information, actions taken by pharmacist):    -- No recent (within 30 days) course of antibiotics  -- No recent (within 30 days) course of systemic steroids  -- Reports not being on blood thinning medications    -- Declines being on any other prescription or over-the-counter medications    Prior to Admission medications    Medication Sig Last Dose Taking? Auth Provider Long Term End Date   acetaminophen (TYLENOL) 500 MG tablet Take 500-1,000 mg by mouth every 8 hours as needed for mild pain Taking Yes Unknown, Entered By History     Carboxymethylcellulose Sodium 1 % GEL Apply 1 drop to eye 4 times daily Taking Yes Reported, Patient     cyproheptadine (PERIACTIN) 4 MG tablet Take 12 mg by mouth At Bedtime Taking Yes Reported, Patient     diphenhydrAMINE-acetaminophen (TYLENOL PM)  MG tablet Take 1-2 tablets by mouth nightly as needed for sleep Taking Yes Unknown, Entered By History     finasteride (PROSCAR) 5 MG tablet Take 1 tablet by mouth daily Taking Yes Reported, Patient     gabapentin (NEURONTIN) 300 MG capsule Dosing as follows:    AM:  300 mg   Afternoon: 300 mg   Evenin mg (300 mg capsule x2)  Bedtime: 900  mg (300 mg capsule x3) Taking Yes Reported, Patient Yes    mirtazapine (REMERON) 30 MG tablet Take 15 mg by mouth At Bedtime Taking Yes Reported, Patient Yes    pantoprazole (PROTONIX) 40 MG EC tablet Take 40 mg by mouth 2 times daily Taking Yes Reported, Patient     polyethylene glycol (MIRALAX) 17 g packet Take 17 g by mouth daily Taking Yes Mohan Dunne MD     prazosin (MINIPRESS) 5 MG capsule Take 10 mg by mouth At Bedtime Taking Yes Unknown, Entered By History Yes    sertraline (ZOLOFT) 50 MG tablet Take 150 mg by mouth every morning Taking Yes Unknown, Entered By History No    simvastatin (ZOCOR) 20 MG tablet Take 20 mg by mouth At Bedtime Taking Yes Unknown, Entered By History Yes    tamsulosin (FLOMAX) 0.4 MG capsule Take 1 capsule by mouth daily Taking Yes Reported, Patient     WHITE PETROLATUM-MINERAL OIL OP Apply to eye At Bedtime Taking Yes Unknown, Entered By History     cholecalciferol 50 MCG (2000 UT) tablet TAKE ONE TABLET BY MOUTH DAILY FOR VITAMIN D SUPPLEMENT  Patient not taking: Reported on 4/3/2023 Not Taking  Reported, Patient     folic acid (FOLVITE) 1 MG tablet Take 1 tablet by mouth daily  Patient not taking: Reported on 4/3/2023 Not Taking  Reported, Patient     NONFORMULARY Nocturnal CPAP with 2L oxygen   Reported, Patient     triamcinolone (KENALOG) 0.1 % external cream Apply 1 Film topically daily as needed  Patient not taking: Reported on 3/29/2023 Not Taking  Reported, Patient     vitamin B-12 (CYANOCOBALAMIN) 1000 MCG tablet Take 1 tablet by mouth daily  Patient not taking: Reported on 4/3/2023 Not Taking  Reported, Patient          Medication history completed by: Roman Lam [facility]

## 2023-04-04 ENCOUNTER — MEDICAL CORRESPONDENCE (OUTPATIENT)
Dept: HEALTH INFORMATION MANAGEMENT | Facility: CLINIC | Age: 73
End: 2023-04-04

## 2023-04-04 ENCOUNTER — PRE VISIT (OUTPATIENT)
Dept: SURGERY | Facility: CLINIC | Age: 73
End: 2023-04-04

## 2023-04-04 ENCOUNTER — ANESTHESIA EVENT (OUTPATIENT)
Dept: SURGERY | Facility: CLINIC | Age: 73
DRG: 027 | End: 2023-04-04
Payer: COMMERCIAL

## 2023-04-04 ENCOUNTER — OFFICE VISIT (OUTPATIENT)
Dept: SURGERY | Facility: CLINIC | Age: 73
End: 2023-04-04
Payer: COMMERCIAL

## 2023-04-04 VITALS
HEART RATE: 61 BPM | RESPIRATION RATE: 12 BRPM | WEIGHT: 204 LBS | OXYGEN SATURATION: 94 % | DIASTOLIC BLOOD PRESSURE: 80 MMHG | BODY MASS INDEX: 32.78 KG/M2 | TEMPERATURE: 97.8 F | HEIGHT: 66 IN | SYSTOLIC BLOOD PRESSURE: 128 MMHG

## 2023-04-04 DIAGNOSIS — G25.0 ESSENTIAL TREMOR: ICD-10-CM

## 2023-04-04 DIAGNOSIS — Z01.818 PREOP EXAMINATION: Primary | ICD-10-CM

## 2023-04-04 PROCEDURE — 99205 OFFICE O/P NEW HI 60 MIN: CPT | Performed by: CLINICAL NURSE SPECIALIST

## 2023-04-04 ASSESSMENT — ENCOUNTER SYMPTOMS: DYSRHYTHMIAS: 0

## 2023-04-04 ASSESSMENT — LIFESTYLE VARIABLES: TOBACCO_USE: 1

## 2023-04-04 ASSESSMENT — PAIN SCALES - GENERAL: PAINLEVEL: MILD PAIN (3)

## 2023-04-04 NOTE — H&P
Pre-Operative H & P     CC:  Preoperative exam to assess for increased cardiopulmonary risk while undergoing surgery and anesthesia.    Date of Encounter: 4/4/2023  Primary Care Physician:  Hamlet Haas     Reason for visit:   Encounter Diagnoses   Name Primary?     Preop examination Yes     Essential tremor        HPI  Arley Butt is a 72 year old male who presents for pre-operative H & P in preparation for  Procedure Information     Case: [id number] Date/Time: 04/11/23 0800    Procedure: stealth assisted Right side deep brain stimulator placement, phase I & II combined, placement of right side deep brain stimulator electrode, target right ventral intermediate nucleus of the thalamus, with microelectrode recording and connection to existing generator/battery (Right: Head)    Anesthesia type: MAC with Local    Diagnosis: Essential tremor [G25.0]    Pre-op diagnosis: Essential tremor [G25.0]    Location:  OR  /  OR    Providers: Manuel Otero MD        History is obtained from the patient and chart review    Patient who has been followed by Dr. Otero for essential tremor s/p left VIM DBS on 10/4/22. In follow up, programming resulted in good tremor control. He is now preparing for above right side procedure.    His history is otherwise significant for HLD, HYPERTENSION, previous smoking, NASIMA with CPAP and 2 liters at night, GERD, alcohol dependence in remission, DVT, Follicular lymphoma, BPH, OA, PTSD, anxiety, and hearing loss.     Hx of abnormal bleeding or anti-platelet use: Denies.      Past Medical History  Past Medical History:   Diagnosis Date     Alcohol dependence (H)      Anemia      Anxiety      BPH (benign prostatic hyperplasia)      Cataract      Chronic pain      DVT (deep venous thrombosis) (H)      Dysphagia      Dysthymia      Erosive gastritis      Esophagitis      Essential tremor      Follicular lymphoma (H)      Foot sprain      Gastroesophageal reflux disease with esophagitis   Discussed with Latricia Madden, PAC, increase Fentanyl to 25 mcg patch every 3 days. Patient can take 2-3 tablets of Dilaudid 2 mg every 3 hours as needed for pain.     Patient advised to call us with an update on Friday.          HTN (hypertension)      Hyperlipidemia      Impacted cerumen      MCI (mild cognitive impairment)      Osteoarthritis      Presbyopia      PTSD (post-traumatic stress disorder)      Sensorineural hearing loss, bilateral      Tinnitus, bilateral      Urinary frequency        Past Surgical History  Past Surgical History:   Procedure Laterality Date     ANKLE SURGERY Right      BACK SURGERY      lower back, herniated disc     CYSTECTOMY      pilonidal     IMPLANT DEEP BRAIN STIMULATION GENERATOR / BATTERY Left 10/4/2022    Procedure: Deep brain stimulator placement, phase II, placement of deep brain stimulator generator/battery over the left chest wall;  Surgeon: Manuel Otero MD;  Location: [facility] OR     OPTICAL TRACKING SYSTEM INSERTION DEEP BRAIN STIMULATION Left 2022    Procedure: stealth assisted Left side deep brain stimulator placement, phase I, placement of left side deep brain stimulator electrode, target left ventral intermediate nucleus of the thalamus, with microelectrode recording;  Surgeon: Manuel Otero MD;  Location: [facility] OR     VASECTOMY         Prior to Admission Medications  Current Outpatient Medications   Medication Sig Dispense Refill     acetaminophen (TYLENOL) 500 MG tablet Take 500-1,000 mg by mouth every 8 hours as needed for mild pain       Carboxymethylcellulose Sodium 1 % GEL Apply 1 drop to eye 4 times daily       cyproheptadine (PERIACTIN) 4 MG tablet Take 12 mg by mouth At Bedtime       diphenhydrAMINE-acetaminophen (TYLENOL PM)  MG tablet Take 1-2 tablets by mouth nightly as needed for sleep       finasteride (PROSCAR) 5 MG tablet Take 1 tablet by mouth daily       gabapentin (NEURONTIN) 300 MG capsule Dosing as follows:    AM:  300 mg   Afternoon: 300 mg   Evenin mg (300 mg capsule x2)  Bedtime: 900 mg (300 mg capsule x3)       mirtazapine (REMERON) 30 MG tablet Take 15 mg by mouth At Bedtime       pantoprazole (PROTONIX) 40 MG EC tablet Take 40 mg by mouth 2  times daily       polyethylene glycol (MIRALAX) 17 g packet Take 17 g by mouth daily 30 packet 0     prazosin (MINIPRESS) 5 MG capsule Take 10 mg by mouth At Bedtime       sertraline (ZOLOFT) 50 MG tablet Take 150 mg by mouth every morning       simvastatin (ZOCOR) 20 MG tablet Take 20 mg by mouth At Bedtime       tamsulosin (FLOMAX) 0.4 MG capsule Take 1 capsule by mouth daily       WHITE PETROLATUM-MINERAL OIL OP Apply to eye At Bedtime       cholecalciferol 50 MCG (2000 UT) tablet TAKE ONE TABLET BY MOUTH DAILY FOR VITAMIN D SUPPLEMENT (Patient not taking: Reported on 4/3/2023)       folic acid (FOLVITE) 1 MG tablet Take 1 tablet by mouth daily (Patient not taking: Reported on 4/3/2023)       NONFORMULARY Nocturnal CPAP with 2L oxygen       triamcinolone (KENALOG) 0.1 % external cream Apply 1 Film topically daily as needed (Patient not taking: Reported on 3/29/2023)       vitamin B-12 (CYANOCOBALAMIN) 1000 MCG tablet Take 1 tablet by mouth daily (Patient not taking: Reported on 4/3/2023)         Allergies  No Known Allergies    Social History  Social History     Socioeconomic History     Marital status:      Spouse name: Not on file     Number of children: Not on file     Years of education: Not on file     Highest education level: Not on file   Occupational History     Not on file   Tobacco Use     Smoking status: Former     Types: Cigarettes     Passive exposure: Past     Smokeless tobacco: Never   Vaping Use     Vaping status: Not on file   Substance and Sexual Activity     Alcohol use: Not Currently     Drug use: Never     Sexual activity: Not on file   Other Topics Concern     Not on file   Social History Narrative     Not on file     Social Determinants of Health     Financial Resource Strain: Not on file   Food Insecurity: Not on file   Transportation Needs: Not on file   Physical Activity: Not on file   Stress: Not on file   Social Connections: Not on file   Intimate Partner Violence: Not on file    Housing Stability: Not on file       Family History  Family History   Problem Relation Age of Onset     Tremor Father      Anesthesia Reaction No family hx of      Clotting Disorder No family hx of        Review of Systems  The complete review of systems is negative other than noted in the HPI or here.   Anesthesia Evaluation   Pt has had prior anesthetic. Type: General.    History of anesthetic complications   Patient with PTSD-helps to have someone with him when he wakes. Try to avoid startling.    ROS/MED HX  ENT/Pulmonary: Comment: CPAP with oxygen 2 liters    (+) sleep apnea, uses CPAP, tobacco use, Past use, 10  Pack-Year Hx,   (-) recent URI   Neurologic: Comment: Essential tremor s/p left DBS on 10/4/22  Chart history of TIA but patient/family denies   (-) no CVA and no TIA   Cardiovascular:     (+) Dyslipidemia hypertension-range: Self [id number]/ ----Previous cardiac testing   Echo: Date: 2015 Results:    Stress Test: Date: 8/25/22 Results:    ECG Reviewed: Date: 8/4/22 Results:  SB  Cath: Date: Results:   (-) taking anticoagulants/antiplatelets, COOK and arrhythmias   METS/Exercise Tolerance: >4 METS    Hematologic: Comments: LLE DVT 2015 before diagnosis of lymphoma    (+) History of blood clots, pt is not anticoagulated,  (-) history of blood transfusion   Musculoskeletal: Comment: Pain of hands  (+) arthritis,     GI/Hepatic:  - neg GI/hepatic ROS   (+) GERD, Asymptomatic on medication,     Renal/Genitourinary:     (+) BPH,     Endo:  - neg endo ROS     Psychiatric/Substance Use: Comment: Quit drinking 8 years ago    (+) psychiatric history anxiety and other (comment) (PTSD)     Infectious Disease:  - neg infectious disease ROS     Malignancy: Comment: Lymphoma [facility] at the [facility]  ~ 6 years ago  Last follow up 3-4 years ago - in remission/ released from clinic  (+) Malignancy, History of Lymphoma/Leukemia.Lymph CA Remission status post Chemo.        Other:  - neg other ROS          BP  "128/80 (BP Location: Right arm, Patient Position: Sitting, Cuff Size: Adult Large)   Pulse 61   Temp 97.8  F (36.6  C) (Oral)   Resp 12   Ht 1.676 m (5' 6\")   Wt 92.5 kg (204 lb)   SpO2 94%   BMI 32.93 kg/m      Physical Exam   Constitutional: Awake, alert, cooperative, no apparent distress, and appears stated age. Accompanied by daughter.  Eyes: Pupils equal, round and reactive to light, extra ocular muscles intact, sclera clear, conjunctiva normal. Glasses on.  HENT: Normocephalic, oral pharynx with moist mucus membranes, good dentition. No goiter appreciated.   Respiratory: Clear to auscultation bilaterally, no crackles or wheezing. No cough or obvious dyspnea.  Cardiovascular: Regular rate and rhythm, normal S1 and S2, and no murmur noted. Carotids +2, no bruits. No edema. Palpable pulses to radial  DP and PT arteries.   GI: Normal bowel sounds, soft, non-distended, non-tender, no masses palpated. Difficult exam due to obese abdomen.  Lymph/Hematologic: No cervical lymphadenopathy and no supraclavicular lymphadenopathy.  Genitourinary: Deferred.   Skin: Warm and dry.   Musculoskeletal: Limited ROM of neck. There is no redness, warmth, or swelling of the joints. Gross motor strength is normal.    Neurologic: Awake, alert, oriented to name, place and time. Cranial nerves II-XII are grossly intact. Gait is normal.   Neuropsychiatric: Calm, cooperative. Normal affect.     Prior Labs/Diagnostic Studies   All labs and imaging personally reviewed   [facility]-3[facility]7/23  WBC 5.8  Hgb 12.3  hematocrit 36.8  Platelets 162    Glu 106  Cr 1.1  Na 142  K 4.0  Cl 109  Ca 9.0  LFTs normal    EK22 Sinus bradycardia, rate 56    Carotid US 21  Right ICA <50% stenosis  Left ICA <50% stenosis    22 Stress test   IMPRESSIONS   Stress ECG with no significant ST changes or arrhythmias concerning for   inducible ischemia.     Overall left ventricular systolic function is normal calculated at 69%   without regional wall " "motion abnormalities.     Myocardial perfusion imaging is normal without evidence of infarct or   ischemia.       3/17/22 MR Brain                                                                    Impression:     1. No acute intracranial pathology.  2. Mild Leukoaraiosis.    The patient's records and results personally reviewed by this provider.     Outside records reviewed from: [facility]    Assessment      Arley Butt is a 72 year old male seen as a PAC referral for risk assessment and optimization for anesthesia.    Plan/Recommendations  Pt will be optimized for the proposed procedure.  See below for details on the assessment, risk, and preoperative recommendations    NEUROLOGY  - No history of TIA, CVA or seizure.   - Essential tremor s/p left side DBS 10/4/22   -Post Op delirium risk factors:  High co-morbid index    ENT  - No current airway concerns.  Will need to be reassessed day of surgery.  Mallampati: II  TM: > 3   Limited ROM of neck     CARDIAC  HLD. Simvastatin at HS. Self monitors -120/ 80s. No other cardiac history, symptoms or meds. Good activity tolerance. Walks on treadmill for 30-45 minutes daily without exertional symptoms. Neg stress test 8/25/22   - METS (Metabolic Equivalents)>4    RCRI: 0.4% of serious cardiac events    PULMONARY  Denies asthma, cough or shortness of breath  NASIMA with regular use of CPAP and 02 at 2 liters. Will bring CPAP on DOS.     - Tobacco History      History   Smoking Status     Former     Types: Cigarettes   Smokeless Tobacco     Never       GI: Denies GERD. Will take Protonix on DOS  PONV Low Risk  Total Score: 1           1 AN PONV: Patient is not a current smoker        /RENAL  - Baseline Creatinine 1.1    ENDOCRINE    - BMI: Estimated body mass index is 32.93 kg/m  as calculated from the following:    Height as of this encounter: 1.676 m (5' 6\").    Weight as of this encounter: 92.5 kg (204 lb).  Obesity (BMI >30)  - No history of Diabetes " Mellitus Lasat random blood glucose 106    HEME: VTE risk: 1.8%   History of LLE DVT 2015. No recurrence.   Denies history of blood transfusion    MSK: OA    PSYCH  - Anxiety/depression/PTSD  Will take gabapentin and sertraline on DOS.   Remeron at HS  Minipress at HS for nightmares    Patient and family request that staff be with him, talking to him, as he awakens due to his PTSD. It helps to prevent startling.     Oncology: Follicular lymphoma s/p chemotherapy, in remission     Bilateral hearing aids.     Final plan will be decided by the assigned anesthesia provider on the date of service.    The patient is optimized for their procedure. AVS with information on surgery time/arrival time, meds and NPO status given by nursing staff. No further diagnostic testing indicated.      On the day of service:     Prep time: 18 minutes  Visit time: 20 minutes  Documentation time: 23 minutes  ------------------------------------------  Total time: 61 minutes      DYAN Barone CNS  Preoperative Assessment Center  [facility]  Clinic and Surgery Center  Phone: [phone]  Fax: [phone]

## 2023-04-04 NOTE — PATIENT INSTRUCTIONS
Preparing for Your Surgery      Name:  Arley Butt   MRN:  [id number]   :  1950   Today's Date:  2023       Arriving for surgery:  Surgery date: 23  Arrival time:  6.00AM     Surgeries and procedures: Adult patients can have 2 visitors all through the surgery process.     Visiting hours: 8 a.m. to 8:30 p.m.     Hospital: Adult patients and children under age 18 can have 4 visitor at a time     No visitors under the age of 5 are allowed for hospital patients.  Double occupancy rooms: Patients can have only two visitors at a time.     Patients with disabilities: Can have a support person with them (family member, service provider     Or someone well informed about their needs) plus the allowed number of visitors     Patients confirmed or suspected to have symptoms of COVID 19 or flu:     No visitors allowed for adult patients.   Children (under age 18) can have 1 named visitor.     People who are sick or showing symptoms of COVID 19 or flu:    Are not allowed to visit patients--we can only make exceptions in special situations.       Please follow these guidelines for your visit:   Arrive wearing a mask over your mouth and nose; we will give you a medical mask to wear    If you arrive wearing a cloth mask.   Keep it on during your entire visit, even when in patient's room.   If you don't wear a mask we'll ask you to leave.     Clean your hands with alcohol hand . Do this when you arrive at and leave the building and patient room,    And again after you touch your mask or anything in the room.     You can t visit if you have a fever, cough, shortness of breath, muscle aches, headaches, sore throat    Or diarrhea      Stay 6 feet away from others during your visit and between visits     Go directly to and from the room you are visiting.     Stay in the patient s room during your visit. Limit going to other places in the hospital as much as possible     Leave bags and jackets at home or in  the car.     For everyone s health, please don t come and go during your visit. That includes for smoking   during your visit.     Please come to:     [facility] [address]  [address] [place]  [address]    -   Parking is available in the Patient Visitor Ramp on [facility].     -   When entering the hospital you will be asked COVID screening questions, you will then be directed to Registration.  Registration will direct you to the 3rd floor Surgery waiting room.     -   Please ask if you need an escort or a wheelchair to the Surgery Waiting Room.  Preop number- [phone] ?     What can I eat or drink?  -  You may eat and drink normally up to 8 hours prior to arrival time. (Until 10.00PM)  -  You may have clear liquids until 2 hours prior to arrival time. (Until 4.00AM)    Examples of clear liquids:  Water  Clear broth  Juices (apple, white grape, white cranberry  and cider) without pulp  Noncarbonated, powder based beverages  (lemonade and Cole-Aid)  Sodas (Sprite, 7-Up, ginger ale and seltzer)  Coffee or tea (without milk or cream)  Gatorade    -  No Alcohol for at least 24 hours before surgery.     Which medicines can I take?    Hold Aspirin for 7 days before surgery.   Hold Multivitamins for 7 days before surgery.  Hold Supplements for 7 days before surgery.  Hold Ibuprofen (Advil, Motrin) for 1 day(s) before surgery--unless otherwise directed by surgeon.  Hold Naproxen (Aleve) for 4 days before surgery.    -  DO NOT take these medications the day of surgery:  Miralax    -  PLEASE TAKE these medications the day of surgery:  Tylenol (as needed), Finasteride (Proscar), Gabapentin, Protonix, Sertraline (Zoloft), Tamsulosin (Flomax)    How do I prepare myself?  - Please take 2 showers (one the night prior to surgery and one the morning of surgery) using Scrubcare or Hibiclens soap.    Use this soap only from the neck to your toes.      Leave the soap on your skin for one minute--then rinse thoroughly.      You may use your own shampoo and conditioner. No other hair products.   - Please remove all jewelry and body piercings.  - No lotions, deodorants or fragrance.  - No makeup or fingernail polish.   - Bring your ID and insurance card.    -If you have a Deep Brain Stimulator, Spinal Cord Stimulator, or any Neuro Stimulator device---you must bring the remote control to the hospital.      ALL PATIENTS GOING HOME THE SAME DAY OF SURGERY ARE REQUIRED TO HAVE A RESPONSIBLE ADULT TO DRIVE AND BE IN ATTENDANCE WITH THEM FOR 24 HOURS FOLLOWING SURGERY.    Covid testing policy as of 12/06/2022  Your surgeon will notify and schedule you for a COVID test if one is needed before surgery--please direct any questions or COVID symptoms to your surgeon      Questions or Concerns:    - For any questions regarding the day of surgery or your hospital stay, please contact the Pre Admission Nursing Office at [phone].       - If you have health changes between today and your surgery, please call your surgeon.       - For questions after surgery, please call your surgeons office.

## 2023-04-10 ASSESSMENT — ENCOUNTER SYMPTOMS: DYSRHYTHMIAS: 0

## 2023-04-10 ASSESSMENT — LIFESTYLE VARIABLES: TOBACCO_USE: 1

## 2023-04-10 NOTE — ANESTHESIA PREPROCEDURE EVALUATION
Anesthesia Pre-Procedure Evaluation    Patient: Arley Butt   MRN: [id number] : 1950        Procedure : Procedure(s):  stealth assisted Right side deep brain stimulator placement, phase I & II combined, placement of right side deep brain stimulator electrode, target right ventral intermediate nucleus of the thalamus, with microelectrode recording and connection to existing generator/battery          Past Medical History:   Diagnosis Date     Alcohol dependence (H)      Anemia      Anxiety      BPH (benign prostatic hyperplasia)      Cataract      Chronic pain      DVT (deep venous thrombosis) (H)      Dysphagia      Dysthymia      Erosive gastritis      Esophagitis      Essential tremor      Follicular lymphoma (H)      Foot sprain      Gastroesophageal reflux disease with esophagitis      HTN (hypertension)      Hyperlipidemia      Impacted cerumen      MCI (mild cognitive impairment)      Osteoarthritis      Presbyopia      PTSD (post-traumatic stress disorder)      Sensorineural hearing loss, bilateral      Tinnitus, bilateral      Urinary frequency       Past Surgical History:   Procedure Laterality Date     ANKLE SURGERY Right      BACK SURGERY      lower back, herniated disc     CYSTECTOMY      pilonidal     IMPLANT DEEP BRAIN STIMULATION GENERATOR / BATTERY Left 10/4/2022    Procedure: Deep brain stimulator placement, phase II, placement of deep brain stimulator generator/battery over the left chest wall;  Surgeon: Manuel Otero MD;  Location: [facility] OR     OPTICAL TRACKING SYSTEM INSERTION DEEP BRAIN STIMULATION Left 2022    Procedure: stealth assisted Left side deep brain stimulator placement, phase I, placement of left side deep brain stimulator electrode, target left ventral intermediate nucleus of the thalamus, with microelectrode recording;  Surgeon: Manuel Otero MD;  Location: [facility] OR     VASECTOMY        No Known Allergies   Social History     Tobacco Use     Smoking status:  Former     Types: Cigarettes     Passive exposure: Past     Smokeless tobacco: Never   Vaping Use     Vaping status: Not on file   Substance Use Topics     Alcohol use: Not Currently      Wt Readings from Last 1 Encounters:   04/04/23 92.5 kg (204 lb)        Anesthesia Evaluation   Pt has had prior anesthetic. Type: General.    History of anesthetic complications   Patient with PTSD-helps to have someone with him when he wakes. Try to avoid startling.    ROS/MED HX  ENT/Pulmonary: Comment: CPAP with oxygen 2 liters    (+) sleep apnea, uses CPAP, tobacco use, Past use, 10  Pack-Year Hx,   (-) recent URI   Neurologic: Comment: Essential tremor s/p left DBS on 10/4/22  Chart history of TIA but patient/family denies   (-) no CVA and no TIA   Cardiovascular:     (+) Dyslipidemia hypertension-range: Self [id number]/ ----Previous cardiac testing   Echo: Date: 2015 Results:  Nl LV size and function  EF 60-65%  Stress Test: Date: 8/25/22 Results:  Stress ECG with no significant ST changes or arrhythmias concerning for   inducible ischemia.     Overall left ventricular systolic function is normal calculated at 69%   without regional wall motion abnormalities.     Myocardial perfusion imaging is normal without evidence of infarct or   ischemia.      ECG Reviewed: Date: 8/4/22 Results:  SB  Cath:  Date: Results:   (-) taking anticoagulants/antiplatelets, COOK and arrhythmias   METS/Exercise Tolerance: >4 METS    Hematologic: Comments: LLE DVT 2015 before diagnosis of lymphoma    (+) History of blood clots, pt is not anticoagulated,  (-) history of blood transfusion   Musculoskeletal: Comment: Pain of hands  (+) arthritis,     GI/Hepatic:  - neg GI/hepatic ROS   (+) GERD, Asymptomatic on medication,     Renal/Genitourinary:     (+) BPH,     Endo:  - neg endo ROS     Psychiatric/Substance Use: Comment: Quit drinking 8 years ago    (+) psychiatric history anxiety and other (comment) (PTSD)     Infectious Disease:  - neg  infectious disease ROS     Malignancy: Comment: Lymphoma treated at the [facility]  ~ 6 years ago  Last follow up 3-4 years ago - in remission/ released from clinic  (+) Malignancy, History of Lymphoma/Leukemia.Lymph CA Remission status post Chemo.        Other:  - neg other ROS          Physical Exam    Airway        Mallampati: II   TM distance: > 3 FB   Neck ROM: full   Mouth opening: > 3 cm    Respiratory Devices and Support         Dental       (+) Modest Abnormalities - crowns, retainers, 1 or 2 missing teeth      Cardiovascular   cardiovascular exam normal       Rhythm and rate: regular and normal     Pulmonary   pulmonary exam normal        breath sounds clear to auscultation           OUTSIDE LABS:  CBC:   Lab Results   Component Value Date    WBC 10.3 09/27/2022    WBC 6.6 09/26/2022    HGB 10.1 (L) 09/27/2022    HGB 11.6 (L) 09/26/2022    HCT 30.4 (L) 09/27/2022    HCT 35.4 (L) 09/26/2022     (L) 09/27/2022     (L) 09/26/2022     BMP:   Lab Results   Component Value Date     09/27/2022     09/26/2022    POTASSIUM 3.8 09/27/2022    POTASSIUM 4.2 09/26/2022    CHLORIDE 110 (H) 09/27/2022    CHLORIDE 109 (H) 09/26/2022    CO2 26 09/27/2022    CO2 25 09/26/2022    BUN 23.2 (H) 09/27/2022    BUN 24.8 (H) 09/26/2022    CR 1.04 09/27/2022    CR 1.19 (H) 09/26/2022     (H) 10/04/2022     (H) 09/27/2022     COAGS:   Lab Results   Component Value Date    PTT 31 09/26/2022    INR 1.03 09/26/2022     POC: No results found for: BGM, HCG, HCGS  HEPATIC: No results found for: ALBUMIN, PROTTOTAL, ALT, AST, GGT, ALKPHOS, BILITOTAL, BILIDIRECT, GURVINDER  OTHER:   Lab Results   Component Value Date    SHRADDHA 8.3 (L) 09/27/2022    PHOS 3.5 09/27/2022    MAG 2.0 09/27/2022       Anesthesia Plan    ASA Status:  3      Anesthesia Type: MAC.     - Reason for MAC: straight local not clinically adequate   Induction: Intravenous, Propofol.   Maintenance: TIVA.   Techniques and Equipment:       -  Drips/Meds: Dexmed. infusion, Dexmed. bolus     Consents    Anesthesia Plan(s) and associated risks, benefits, and realistic alternatives discussed. Questions answered and patient/representative(s) expressed understanding.    - Discussed:     - Discussed with:  Patient      - Extended Intubation/Ventilatory Support Discussed: No.      - Patient is DNR/DNI Status: No    Use of blood products discussed: No .     Postoperative Care    Pain management: IV analgesics, Oral pain medications, Multi-modal analgesia.   PONV prophylaxis: Ondansetron (or other 5HT-3), Background Propofol Infusion     Comments:    Other Comments: Patient seen and examined by me and available records reviewed. Details as above; discussed with Dr. Hoyos.  Patient had no issues with last MAC for R DBS; had good result and presents today for L DBS.  Concerns re PTSD discussed with patient and his family.   Anesthetic options and risks discussed with patient who agrees to proceed with MAC and GA as back up.      Stefania Martino MD  4/11/2023            Diego Hoyos MD

## 2023-04-11 ENCOUNTER — TELEPHONE (OUTPATIENT)
Dept: NEUROLOGY | Facility: CLINIC | Age: 73
End: 2023-04-11

## 2023-04-11 ENCOUNTER — HOSPITAL ENCOUNTER (INPATIENT)
Facility: CLINIC | Age: 73
LOS: 1 days | Discharge: HOME | DRG: 027 | End: 2023-04-12
Attending: NEUROLOGICAL SURGERY | Admitting: NEUROLOGICAL SURGERY
Payer: COMMERCIAL

## 2023-04-11 ENCOUNTER — APPOINTMENT (OUTPATIENT)
Dept: GENERAL RADIOLOGY | Facility: CLINIC | Age: 73
DRG: 027 | End: 2023-04-11
Attending: NEUROLOGICAL SURGERY
Payer: COMMERCIAL

## 2023-04-11 ENCOUNTER — APPOINTMENT (OUTPATIENT)
Dept: CT IMAGING | Facility: CLINIC | Age: 73
DRG: 027 | End: 2023-04-11
Attending: NEUROLOGICAL SURGERY
Payer: COMMERCIAL

## 2023-04-11 ENCOUNTER — TRANSCRIBE ORDERS (OUTPATIENT)
Dept: OTHER | Age: 73
End: 2023-04-11

## 2023-04-11 ENCOUNTER — ANESTHESIA (OUTPATIENT)
Dept: SURGERY | Facility: CLINIC | Age: 73
DRG: 027 | End: 2023-04-11
Payer: COMMERCIAL

## 2023-04-11 ENCOUNTER — APPOINTMENT (OUTPATIENT)
Dept: CT IMAGING | Facility: CLINIC | Age: 73
DRG: 027 | End: 2023-04-11
Attending: STUDENT IN AN ORGANIZED HEALTH CARE EDUCATION/TRAINING PROGRAM
Payer: COMMERCIAL

## 2023-04-11 DIAGNOSIS — Z96.89 S/P DEEP BRAIN STIMULATOR PLACEMENT: Primary | ICD-10-CM

## 2023-04-11 DIAGNOSIS — G25.2 OTHER SPECIFIED FORMS OF TREMOR: Primary | ICD-10-CM

## 2023-04-11 DIAGNOSIS — G25.0 ESSENTIAL TREMOR: Primary | ICD-10-CM

## 2023-04-11 DIAGNOSIS — G25.0 ESSENTIAL TREMOR: ICD-10-CM

## 2023-04-11 LAB
GLUCOSE BLDC GLUCOMTR-MCNC: 106 MG/DL (ref 70–99)
SARS-COV-2 RNA RESP QL NAA+PROBE: NEGATIVE

## 2023-04-11 PROCEDURE — 250N000009 HC RX 250: Performed by: STUDENT IN AN ORGANIZED HEALTH CARE EDUCATION/TRAINING PROGRAM

## 2023-04-11 PROCEDURE — 250N000013 HC RX MED GY IP 250 OP 250 PS 637: Performed by: STUDENT IN AN ORGANIZED HEALTH CARE EDUCATION/TRAINING PROGRAM

## 2023-04-11 PROCEDURE — 258N000003 HC RX IP 258 OP 636

## 2023-04-11 PROCEDURE — 258N000003 HC RX IP 258 OP 636: Performed by: STUDENT IN AN ORGANIZED HEALTH CARE EDUCATION/TRAINING PROGRAM

## 2023-04-11 PROCEDURE — 710N000010 HC RECOVERY PHASE 1, LEVEL 2, PER MIN: Performed by: NEUROLOGICAL SURGERY

## 2023-04-11 PROCEDURE — 00H03MZ INSERTION OF NEUROSTIMULATOR LEAD INTO BRAIN, PERCUTANEOUS APPROACH: ICD-10-PCS | Performed by: NEUROLOGICAL SURGERY

## 2023-04-11 PROCEDURE — 70450 CT HEAD/BRAIN W/O DYE: CPT | Mod: 77

## 2023-04-11 PROCEDURE — 250N000009 HC RX 250: Performed by: NEUROLOGICAL SURGERY

## 2023-04-11 PROCEDURE — 8E09XBZ COMPUTER ASSISTED PROCEDURE OF HEAD AND NECK REGION: ICD-10-PCS | Performed by: NEUROLOGICAL SURGERY

## 2023-04-11 PROCEDURE — C1713 ANCHOR/SCREW BN/BN,TIS/BN: HCPCS | Performed by: NEUROLOGICAL SURGERY

## 2023-04-11 PROCEDURE — 250N000013 HC RX MED GY IP 250 OP 250 PS 637

## 2023-04-11 PROCEDURE — 999N000065 XR SKULL 1/3 VIEWS

## 2023-04-11 PROCEDURE — 120N000002 HC R&B MED SURG/OB UMMC

## 2023-04-11 PROCEDURE — 250N000011 HC RX IP 250 OP 636: Performed by: STUDENT IN AN ORGANIZED HEALTH CARE EDUCATION/TRAINING PROGRAM

## 2023-04-11 PROCEDURE — C1778 LEAD, NEUROSTIMULATOR: HCPCS | Performed by: NEUROLOGICAL SURGERY

## 2023-04-11 PROCEDURE — U0005 INFEC AGEN DETEC AMPLI PROBE: HCPCS

## 2023-04-11 PROCEDURE — 272N000002 HC OR SUPPLY OTHER OPNP: Performed by: NEUROLOGICAL SURGERY

## 2023-04-11 PROCEDURE — 250N000011 HC RX IP 250 OP 636

## 2023-04-11 PROCEDURE — 272N000001 HC OR GENERAL SUPPLY STERILE: Performed by: NEUROLOGICAL SURGERY

## 2023-04-11 PROCEDURE — 61867 IMPLANT NEUROELECTRODE: CPT | Mod: RT | Performed by: NEUROLOGICAL SURGERY

## 2023-04-11 PROCEDURE — 999N000179 XR SURGERY CARM FLUORO LESS THAN 5 MIN W STILLS: Mod: TC

## 2023-04-11 PROCEDURE — 250N000011 HC RX IP 250 OP 636: Performed by: NEUROLOGICAL SURGERY

## 2023-04-11 PROCEDURE — 70450 CT HEAD/BRAIN W/O DYE: CPT

## 2023-04-11 PROCEDURE — 70450 CT HEAD/BRAIN W/O DYE: CPT | Mod: 26 | Performed by: RADIOLOGY

## 2023-04-11 PROCEDURE — 360N000086 HC SURGERY LEVEL 6 W/ FLUORO, PER MIN: Performed by: NEUROLOGICAL SURGERY

## 2023-04-11 PROCEDURE — 999N000141 HC STATISTIC PRE-PROCEDURE NURSING ASSESSMENT: Performed by: NEUROLOGICAL SURGERY

## 2023-04-11 PROCEDURE — 370N000017 HC ANESTHESIA TECHNICAL FEE, PER MIN: Performed by: NEUROLOGICAL SURGERY

## 2023-04-11 PROCEDURE — 272N000004 HC RX 272: Performed by: NEUROLOGICAL SURGERY

## 2023-04-11 PROCEDURE — 70250 X-RAY EXAM OF SKULL: CPT | Mod: 26 | Performed by: RADIOLOGY

## 2023-04-11 DEVICE — DBS DIRECTIONAL LEAD STERILE KIT 45CM DB-2202-45: Type: IMPLANTABLE DEVICE | Site: CRANIAL | Status: FUNCTIONAL

## 2023-04-11 DEVICE — KIT BURR HOLE COVER VERCISE DBS M365DB4600C0: Type: IMPLANTABLE DEVICE | Site: CRANIAL | Status: FUNCTIONAL

## 2023-04-11 DEVICE — IMP SCR SYN MATRIX LOW PRO 1.5X04MM SELF DRILL 04.503.104.01: Type: IMPLANTABLE DEVICE | Site: CRANIAL | Status: FUNCTIONAL

## 2023-04-11 RX ORDER — DIPHENHYDRAMINE HCL 25 MG
25-50 CAPSULE ORAL
Qty: 10 CAPSULE | Refills: 0 | Status: SHIPPED | OUTPATIENT
Start: 2023-04-11

## 2023-04-11 RX ORDER — AMOXICILLIN 250 MG
1 CAPSULE ORAL 2 TIMES DAILY
Status: DISCONTINUED | OUTPATIENT
Start: 2023-04-11 | End: 2023-04-12 | Stop reason: HOSPADM

## 2023-04-11 RX ORDER — ONDANSETRON 4 MG/1
4 TABLET, ORALLY DISINTEGRATING ORAL EVERY 6 HOURS PRN
Status: DISCONTINUED | OUTPATIENT
Start: 2023-04-11 | End: 2023-04-12 | Stop reason: HOSPADM

## 2023-04-11 RX ORDER — HYDROMORPHONE HCL IN WATER/PF 6 MG/30 ML
0.2 PATIENT CONTROLLED ANALGESIA SYRINGE INTRAVENOUS EVERY 5 MIN PRN
Status: DISCONTINUED | OUTPATIENT
Start: 2023-04-11 | End: 2023-04-11

## 2023-04-11 RX ORDER — LIDOCAINE 40 MG/G
CREAM TOPICAL
Status: DISCONTINUED | OUTPATIENT
Start: 2023-04-11 | End: 2023-04-12 | Stop reason: HOSPADM

## 2023-04-11 RX ORDER — CEFAZOLIN SODIUM/WATER 2 G/20 ML
2 SYRINGE (ML) INTRAVENOUS EVERY 8 HOURS
Status: DISCONTINUED | OUTPATIENT
Start: 2023-04-11 | End: 2023-04-11

## 2023-04-11 RX ORDER — GABAPENTIN 300 MG/1
300 CAPSULE ORAL 2 TIMES DAILY
Status: DISCONTINUED | OUTPATIENT
Start: 2023-04-11 | End: 2023-04-12 | Stop reason: HOSPADM

## 2023-04-11 RX ORDER — HYDRALAZINE HYDROCHLORIDE 20 MG/ML
10-20 INJECTION INTRAMUSCULAR; INTRAVENOUS EVERY 30 MIN PRN
Status: DISCONTINUED | OUTPATIENT
Start: 2023-04-11 | End: 2023-04-12 | Stop reason: HOSPADM

## 2023-04-11 RX ORDER — PROCHLORPERAZINE MALEATE 5 MG
5 TABLET ORAL EVERY 6 HOURS PRN
Status: DISCONTINUED | OUTPATIENT
Start: 2023-04-11 | End: 2023-04-12 | Stop reason: HOSPADM

## 2023-04-11 RX ORDER — HYDROMORPHONE HCL IN WATER/PF 6 MG/30 ML
0.4 PATIENT CONTROLLED ANALGESIA SYRINGE INTRAVENOUS EVERY 5 MIN PRN
Status: DISCONTINUED | OUTPATIENT
Start: 2023-04-11 | End: 2023-04-11

## 2023-04-11 RX ORDER — ACETAMINOPHEN 325 MG/1
975 TABLET ORAL ONCE
Status: COMPLETED | OUTPATIENT
Start: 2023-04-11 | End: 2023-04-11

## 2023-04-11 RX ORDER — NALOXONE HYDROCHLORIDE 0.4 MG/ML
0.4 INJECTION, SOLUTION INTRAMUSCULAR; INTRAVENOUS; SUBCUTANEOUS
Status: DISCONTINUED | OUTPATIENT
Start: 2023-04-11 | End: 2023-04-12 | Stop reason: HOSPADM

## 2023-04-11 RX ORDER — ACETAMINOPHEN 325 MG/1
650 TABLET ORAL EVERY 4 HOURS PRN
Qty: 30 TABLET | Refills: 0 | Status: SHIPPED | OUTPATIENT
Start: 2023-04-14

## 2023-04-11 RX ORDER — ONDANSETRON 2 MG/ML
INJECTION INTRAMUSCULAR; INTRAVENOUS PRN
Status: DISCONTINUED | OUTPATIENT
Start: 2023-04-11 | End: 2023-04-11

## 2023-04-11 RX ORDER — LIDOCAINE 40 MG/G
CREAM TOPICAL
Status: DISCONTINUED | OUTPATIENT
Start: 2023-04-11 | End: 2023-04-11 | Stop reason: HOSPADM

## 2023-04-11 RX ORDER — ACETAMINOPHEN 500 MG
500-1000 TABLET ORAL
Status: DISCONTINUED | OUTPATIENT
Start: 2023-04-11 | End: 2023-04-12 | Stop reason: HOSPADM

## 2023-04-11 RX ORDER — PROPOFOL 10 MG/ML
INJECTION, EMULSION INTRAVENOUS PRN
Status: DISCONTINUED | OUTPATIENT
Start: 2023-04-11 | End: 2023-04-11

## 2023-04-11 RX ORDER — ONDANSETRON 4 MG/1
4 TABLET, ORALLY DISINTEGRATING ORAL EVERY 6 HOURS PRN
Qty: 10 TABLET | Refills: 0 | Status: SHIPPED | OUTPATIENT
Start: 2023-04-11

## 2023-04-11 RX ORDER — LABETALOL HYDROCHLORIDE 5 MG/ML
10-40 INJECTION, SOLUTION INTRAVENOUS EVERY 10 MIN PRN
Status: DISCONTINUED | OUTPATIENT
Start: 2023-04-11 | End: 2023-04-12 | Stop reason: HOSPADM

## 2023-04-11 RX ORDER — PRAZOSIN HYDROCHLORIDE 5 MG/1
10 CAPSULE ORAL AT BEDTIME
Status: DISCONTINUED | OUTPATIENT
Start: 2023-04-11 | End: 2023-04-12 | Stop reason: HOSPADM

## 2023-04-11 RX ORDER — OXYCODONE HYDROCHLORIDE 10 MG/1
10 TABLET ORAL EVERY 4 HOURS PRN
Status: DISCONTINUED | OUTPATIENT
Start: 2023-04-11 | End: 2023-04-12 | Stop reason: HOSPADM

## 2023-04-11 RX ORDER — DIPHENHYDRAMINE HCL 25 MG
25-50 CAPSULE ORAL
Status: DISCONTINUED | OUTPATIENT
Start: 2023-04-11 | End: 2023-04-12 | Stop reason: HOSPADM

## 2023-04-11 RX ORDER — HYDRALAZINE HYDROCHLORIDE 20 MG/ML
INJECTION INTRAMUSCULAR; INTRAVENOUS PRN
Status: DISCONTINUED | OUTPATIENT
Start: 2023-04-11 | End: 2023-04-11

## 2023-04-11 RX ORDER — NALOXONE HYDROCHLORIDE 0.4 MG/ML
0.2 INJECTION, SOLUTION INTRAMUSCULAR; INTRAVENOUS; SUBCUTANEOUS
Status: DISCONTINUED | OUTPATIENT
Start: 2023-04-11 | End: 2023-04-12 | Stop reason: HOSPADM

## 2023-04-11 RX ORDER — SODIUM CHLORIDE, SODIUM LACTATE, POTASSIUM CHLORIDE, CALCIUM CHLORIDE 600; 310; 30; 20 MG/100ML; MG/100ML; MG/100ML; MG/100ML
INJECTION, SOLUTION INTRAVENOUS CONTINUOUS
Status: DISCONTINUED | OUTPATIENT
Start: 2023-04-11 | End: 2023-04-11 | Stop reason: HOSPADM

## 2023-04-11 RX ORDER — ACETAMINOPHEN 325 MG/1
975 TABLET ORAL EVERY 8 HOURS
Status: DISCONTINUED | OUTPATIENT
Start: 2023-04-11 | End: 2023-04-12 | Stop reason: HOSPADM

## 2023-04-11 RX ORDER — GABAPENTIN 300 MG/1
600 CAPSULE ORAL EVERY EVENING
Status: DISCONTINUED | OUTPATIENT
Start: 2023-04-11 | End: 2023-04-12 | Stop reason: HOSPADM

## 2023-04-11 RX ORDER — BISACODYL 10 MG
10 SUPPOSITORY, RECTAL RECTAL DAILY PRN
Status: DISCONTINUED | OUTPATIENT
Start: 2023-04-11 | End: 2023-04-12 | Stop reason: HOSPADM

## 2023-04-11 RX ORDER — POLYETHYLENE GLYCOL 3350 17 G/17G
17 POWDER, FOR SOLUTION ORAL DAILY
Status: DISCONTINUED | OUTPATIENT
Start: 2023-04-12 | End: 2023-04-12 | Stop reason: HOSPADM

## 2023-04-11 RX ORDER — LIDOCAINE HYDROCHLORIDE 20 MG/ML
JELLY TOPICAL PRN
Status: DISCONTINUED | OUTPATIENT
Start: 2023-04-11 | End: 2023-04-11 | Stop reason: HOSPADM

## 2023-04-11 RX ORDER — SODIUM CHLORIDE, SODIUM LACTATE, POTASSIUM CHLORIDE, CALCIUM CHLORIDE 600; 310; 30; 20 MG/100ML; MG/100ML; MG/100ML; MG/100ML
INJECTION, SOLUTION INTRAVENOUS CONTINUOUS
Status: DISCONTINUED | OUTPATIENT
Start: 2023-04-11 | End: 2023-04-11

## 2023-04-11 RX ORDER — FENTANYL CITRATE 50 UG/ML
25 INJECTION, SOLUTION INTRAMUSCULAR; INTRAVENOUS EVERY 5 MIN PRN
Status: DISCONTINUED | OUTPATIENT
Start: 2023-04-11 | End: 2023-04-11

## 2023-04-11 RX ORDER — OXYCODONE HYDROCHLORIDE 5 MG/1
5 TABLET ORAL EVERY 4 HOURS PRN
Status: DISCONTINUED | OUTPATIENT
Start: 2023-04-11 | End: 2023-04-12 | Stop reason: HOSPADM

## 2023-04-11 RX ORDER — CARBOXYMETHYLCELLULOSE SODIUM 10 MG/ML
1 GEL OPHTHALMIC 4 TIMES DAILY
Status: DISCONTINUED | OUTPATIENT
Start: 2023-04-11 | End: 2023-04-12 | Stop reason: HOSPADM

## 2023-04-11 RX ORDER — TAMSULOSIN HYDROCHLORIDE 0.4 MG/1
0.4 CAPSULE ORAL DAILY
Status: DISCONTINUED | OUTPATIENT
Start: 2023-04-11 | End: 2023-04-12 | Stop reason: HOSPADM

## 2023-04-11 RX ORDER — SIMVASTATIN 20 MG
20 TABLET ORAL AT BEDTIME
Status: DISCONTINUED | OUTPATIENT
Start: 2023-04-11 | End: 2023-04-12 | Stop reason: HOSPADM

## 2023-04-11 RX ORDER — CYPROHEPTADINE HYDROCHLORIDE 4 MG/1
12 TABLET ORAL AT BEDTIME
Status: DISCONTINUED | OUTPATIENT
Start: 2023-04-11 | End: 2023-04-12 | Stop reason: HOSPADM

## 2023-04-11 RX ORDER — FENTANYL CITRATE 50 UG/ML
50 INJECTION, SOLUTION INTRAMUSCULAR; INTRAVENOUS EVERY 5 MIN PRN
Status: DISCONTINUED | OUTPATIENT
Start: 2023-04-11 | End: 2023-04-11

## 2023-04-11 RX ORDER — ONDANSETRON 4 MG/1
4 TABLET, ORALLY DISINTEGRATING ORAL EVERY 30 MIN PRN
Status: DISCONTINUED | OUTPATIENT
Start: 2023-04-11 | End: 2023-04-11

## 2023-04-11 RX ORDER — ONDANSETRON 2 MG/ML
4 INJECTION INTRAMUSCULAR; INTRAVENOUS EVERY 30 MIN PRN
Status: DISCONTINUED | OUTPATIENT
Start: 2023-04-11 | End: 2023-04-11

## 2023-04-11 RX ORDER — CEFAZOLIN SODIUM 2 G/100ML
2 INJECTION, SOLUTION INTRAVENOUS EVERY 8 HOURS
Status: COMPLETED | OUTPATIENT
Start: 2023-04-12 | End: 2023-04-12

## 2023-04-11 RX ORDER — MIRTAZAPINE 15 MG/1
15 TABLET, FILM COATED ORAL AT BEDTIME
Status: DISCONTINUED | OUTPATIENT
Start: 2023-04-11 | End: 2023-04-12 | Stop reason: HOSPADM

## 2023-04-11 RX ORDER — AMOXICILLIN 250 MG
1 CAPSULE ORAL 2 TIMES DAILY
Qty: 14 TABLET | Refills: 0 | Status: SHIPPED | OUTPATIENT
Start: 2023-04-11 | End: 2023-04-18

## 2023-04-11 RX ORDER — PANTOPRAZOLE SODIUM 40 MG/1
40 TABLET, DELAYED RELEASE ORAL 2 TIMES DAILY
Status: DISCONTINUED | OUTPATIENT
Start: 2023-04-11 | End: 2023-04-12 | Stop reason: HOSPADM

## 2023-04-11 RX ORDER — LIDOCAINE HYDROCHLORIDE 20 MG/ML
INJECTION, SOLUTION INFILTRATION; PERINEURAL PRN
Status: DISCONTINUED | OUTPATIENT
Start: 2023-04-11 | End: 2023-04-11

## 2023-04-11 RX ORDER — FINASTERIDE 5 MG/1
5 TABLET, FILM COATED ORAL DAILY
Status: DISCONTINUED | OUTPATIENT
Start: 2023-04-11 | End: 2023-04-12 | Stop reason: HOSPADM

## 2023-04-11 RX ORDER — FENTANYL CITRATE 50 UG/ML
INJECTION, SOLUTION INTRAMUSCULAR; INTRAVENOUS PRN
Status: DISCONTINUED | OUTPATIENT
Start: 2023-04-11 | End: 2023-04-11

## 2023-04-11 RX ORDER — ACETAMINOPHEN 325 MG/1
650 TABLET ORAL EVERY 4 HOURS PRN
Status: DISCONTINUED | OUTPATIENT
Start: 2023-04-14 | End: 2023-04-12 | Stop reason: HOSPADM

## 2023-04-11 RX ORDER — PROPOFOL 10 MG/ML
INJECTION, EMULSION INTRAVENOUS CONTINUOUS PRN
Status: DISCONTINUED | OUTPATIENT
Start: 2023-04-11 | End: 2023-04-11

## 2023-04-11 RX ORDER — CEFAZOLIN SODIUM/WATER 2 G/20 ML
SYRINGE (ML) INTRAVENOUS PRN
Status: DISCONTINUED | OUTPATIENT
Start: 2023-04-11 | End: 2023-04-11

## 2023-04-11 RX ORDER — GABAPENTIN 300 MG/1
900 CAPSULE ORAL AT BEDTIME
Status: DISCONTINUED | OUTPATIENT
Start: 2023-04-11 | End: 2023-04-12 | Stop reason: HOSPADM

## 2023-04-11 RX ORDER — SODIUM CHLORIDE 9 MG/ML
INJECTION, SOLUTION INTRAVENOUS CONTINUOUS
Status: ACTIVE | OUTPATIENT
Start: 2023-04-11 | End: 2023-04-12

## 2023-04-11 RX ORDER — ONDANSETRON 2 MG/ML
4 INJECTION INTRAMUSCULAR; INTRAVENOUS EVERY 6 HOURS PRN
Status: DISCONTINUED | OUTPATIENT
Start: 2023-04-11 | End: 2023-04-12 | Stop reason: HOSPADM

## 2023-04-11 RX ADMIN — PROPOFOL 30 MG: 10 INJECTION, EMULSION INTRAVENOUS at 08:41

## 2023-04-11 RX ADMIN — SODIUM CHLORIDE: 9 INJECTION, SOLUTION INTRAVENOUS at 23:32

## 2023-04-11 RX ADMIN — SENNOSIDES AND DOCUSATE SODIUM 1 TABLET: 50; 8.6 TABLET ORAL at 20:32

## 2023-04-11 RX ADMIN — Medication 2 G: at 08:36

## 2023-04-11 RX ADMIN — OXYCODONE HYDROCHLORIDE 10 MG: 10 TABLET ORAL at 20:32

## 2023-04-11 RX ADMIN — HYDROMORPHONE HYDROCHLORIDE 0.2 MG: 0.2 INJECTION, SOLUTION INTRAMUSCULAR; INTRAVENOUS; SUBCUTANEOUS at 15:36

## 2023-04-11 RX ADMIN — SODIUM CHLORIDE, POTASSIUM CHLORIDE, SODIUM LACTATE AND CALCIUM CHLORIDE: 600; 310; 30; 20 INJECTION, SOLUTION INTRAVENOUS at 08:24

## 2023-04-11 RX ADMIN — LIDOCAINE HYDROCHLORIDE 60 MG: 20 INJECTION, SOLUTION INFILTRATION; PERINEURAL at 08:36

## 2023-04-11 RX ADMIN — Medication 2 G: at 16:09

## 2023-04-11 RX ADMIN — FENTANYL CITRATE 25 MCG: 50 INJECTION, SOLUTION INTRAMUSCULAR; INTRAVENOUS at 13:30

## 2023-04-11 RX ADMIN — PRAZOSIN HYDROCHLORIDE 10 MG: 5 CAPSULE ORAL at 22:34

## 2023-04-11 RX ADMIN — PROPOFOL 40 MG: 10 INJECTION, EMULSION INTRAVENOUS at 09:29

## 2023-04-11 RX ADMIN — Medication 2 G: at 12:36

## 2023-04-11 RX ADMIN — PROPOFOL 20 MG: 10 INJECTION, EMULSION INTRAVENOUS at 13:33

## 2023-04-11 RX ADMIN — CEFAZOLIN SODIUM 2 G: 2 INJECTION, SOLUTION INTRAVENOUS at 23:29

## 2023-04-11 RX ADMIN — PANTOPRAZOLE SODIUM 40 MG: 40 TABLET, DELAYED RELEASE ORAL at 20:32

## 2023-04-11 RX ADMIN — PROPOFOL 40 MG: 10 INJECTION, EMULSION INTRAVENOUS at 12:34

## 2023-04-11 RX ADMIN — SODIUM CHLORIDE: 9 INJECTION, SOLUTION INTRAVENOUS at 14:37

## 2023-04-11 RX ADMIN — GABAPENTIN 900 MG: 300 CAPSULE ORAL at 22:34

## 2023-04-11 RX ADMIN — HYDRALAZINE HYDROCHLORIDE 5 MG: 20 INJECTION INTRAMUSCULAR; INTRAVENOUS at 09:56

## 2023-04-11 RX ADMIN — DEXMEDETOMIDINE HYDROCHLORIDE 0.7 MCG/KG/HR: 100 INJECTION, SOLUTION INTRAVENOUS at 08:37

## 2023-04-11 RX ADMIN — FENTANYL CITRATE 25 MCG: 50 INJECTION, SOLUTION INTRAMUSCULAR; INTRAVENOUS at 13:03

## 2023-04-11 RX ADMIN — OXYCODONE HYDROCHLORIDE 5 MG: 5 TABLET ORAL at 15:56

## 2023-04-11 RX ADMIN — PROPOFOL 20 MCG/KG/MIN: 10 INJECTION, EMULSION INTRAVENOUS at 09:29

## 2023-04-11 RX ADMIN — SIMVASTATIN 20 MG: 20 TABLET, FILM COATED ORAL at 22:34

## 2023-04-11 RX ADMIN — ACETAMINOPHEN 975 MG: 325 TABLET, FILM COATED ORAL at 23:28

## 2023-04-11 RX ADMIN — PROPOFOL 30 MG: 10 INJECTION, EMULSION INTRAVENOUS at 08:45

## 2023-04-11 RX ADMIN — CYPROHEPTADINE HYDROCHLORIDE 12 MG: 4 TABLET ORAL at 22:35

## 2023-04-11 RX ADMIN — DEXMEDETOMIDINE HYDROCHLORIDE 8 MCG: 100 INJECTION, SOLUTION INTRAVENOUS at 08:37

## 2023-04-11 RX ADMIN — ONDANSETRON 4 MG: 2 INJECTION INTRAMUSCULAR; INTRAVENOUS at 13:18

## 2023-04-11 RX ADMIN — ACETAMINOPHEN 975 MG: 325 TABLET ORAL at 06:46

## 2023-04-11 RX ADMIN — PROPOFOL 30 MG: 10 INJECTION, EMULSION INTRAVENOUS at 09:12

## 2023-04-11 RX ADMIN — FENTANYL CITRATE 25 MCG: 50 INJECTION, SOLUTION INTRAMUSCULAR; INTRAVENOUS at 13:17

## 2023-04-11 RX ADMIN — GABAPENTIN 600 MG: 300 CAPSULE ORAL at 18:16

## 2023-04-11 RX ADMIN — GABAPENTIN 300 MG: 300 CAPSULE ORAL at 15:56

## 2023-04-11 RX ADMIN — PROPOFOL 30 MG: 10 INJECTION, EMULSION INTRAVENOUS at 13:07

## 2023-04-11 RX ADMIN — PROPOFOL 30 MG: 10 INJECTION, EMULSION INTRAVENOUS at 08:37

## 2023-04-11 RX ADMIN — FENTANYL CITRATE 25 MCG: 50 INJECTION, SOLUTION INTRAMUSCULAR; INTRAVENOUS at 09:13

## 2023-04-11 RX ADMIN — MIRTAZAPINE 15 MG: 15 TABLET, FILM COATED ORAL at 22:34

## 2023-04-11 RX ADMIN — FINASTERIDE 5 MG: 5 TABLET, FILM COATED ORAL at 18:17

## 2023-04-11 RX ADMIN — TAMSULOSIN HYDROCHLORIDE 0.4 MG: 0.4 CAPSULE ORAL at 18:16

## 2023-04-11 RX ADMIN — ACETAMINOPHEN 975 MG: 325 TABLET, FILM COATED ORAL at 15:34

## 2023-04-11 ASSESSMENT — ACTIVITIES OF DAILY LIVING (ADL)
ADLS_ACUITY_SCORE: 22
ADLS_ACUITY_SCORE: 22
ADLS_ACUITY_SCORE: 26
ADLS_ACUITY_SCORE: 22
ADLS_ACUITY_SCORE: 26
ADLS_ACUITY_SCORE: 26

## 2023-04-11 ASSESSMENT — VISUAL ACUITY
OU: GLASSES

## 2023-04-11 NOTE — BRIEF OP NOTE
[facility]    Brief Operative Note    Pre-operative diagnosis: Essential tremor [G25.0]  Post-operative diagnosis Same as pre-operative diagnosis    Procedure: Procedure(s):  stealth assisted Right side deep brain stimulator placement, phase I & II combined, placement of right side deep brain stimulator electrode, target right ventral intermediate nucleus of the thalamus, with microelectrode recording and connection to existing generator/battery  Surgeon: Surgeon(s) and Role:     * Manuel Otero MD - Primary  Anesthesia: MAC with Local   Estimated Blood Loss: 35 mL from 4/11/2023  8:28 AM to 4/11/2023  2:09 PM      Drains: None  Specimens: * No specimens in log *  Findings:  Lead confirmed in appropriate place with XR; all impedances within normal limits  Complications: None.  Implants:   Implant Name Type Inv. Item Serial No.  Lot No. LRB No. Used Action   KIT AMILCAR HOLE COVER VERCISE DBS [id number] - [id number] Metallic Hardware/Frohna KIT AMILCAR HOLE COVER VERCISE DBS [id number]  [employer] [id number] Right 1 Implanted   DBS DIRECTIONAL LEAD STERILE KIT 45CM [id number] - [id number] Leads DBS DIRECTIONAL LEAD STERILE KIT 45CM [id number]  [employer] N/A Right 1 Implanted   IMP SCR SYN MATRIX LOW PRO 1.5X04MM SELF DRILL [id number] - [id number] Metallic Hardware/Frohna IMP SCR SYN MATRIX LOW PRO 1.5X04MM SELF DRILL [id number]  [employer] N/A Right 4 Implanted   Titanium straight plates     N/A Right 2 Implanted       Trudy Garsia MD, PhD  PGY-2 Neurosurgery  Please page NSGY on call with questions

## 2023-04-11 NOTE — PROGRESS NOTES
Arrived from: PACU    Belongings/meds: CPAP from home, clothing, and shoes remain in pt room   2 RN Skin Assessment Completed by: Oksana PEREZ     Non-intact findings documented (yes/no/NA): Yes- head incisions x2, pin sites x4, small scab above groin area

## 2023-04-11 NOTE — ANESTHESIA POSTPROCEDURE EVALUATION
Patient: Arley Butt    Procedure: Procedure(s):  stealth assisted Right side deep brain stimulator placement, phase I & II combined, placement of right side deep brain stimulator electrode, target right ventral intermediate nucleus of the thalamus, with microelectrode recording and connection to existing generator/battery       Anesthesia Type:  MAC    Note:  Disposition: Outpatient   Postop Pain Control: Uneventful            Sign Out: Well controlled pain   PONV: No   Neuro/Psych: Uneventful            Sign Out: Acceptable/Baseline neuro status   Airway/Respiratory: Uneventful            Sign Out: Acceptable/Baseline resp. status   CV/Hemodynamics: Uneventful            Sign Out: Acceptable CV status; No obvious hypovolemia; No obvious fluid overload   Other NRE: NONE   DID A NON-ROUTINE EVENT OCCUR? No    Event details/Postop Comments:  Awake, comfortable; satisfactory recovery from MAC.    Stefania Martino MD  4/11/2023  2:43 PM           Last vitals:  Vitals Value Taken Time   BP 92/41 04/11/23 1430   Temp 36.3  C (97.4  F) 04/11/23 1405   Pulse 56 04/11/23 1439   Resp 27 04/11/23 1439   SpO2 94 % 04/11/23 1439   Vitals shown include unvalidated device data.    Electronically Signed By: Stefania Martino MD  April 11, 2023  2:41 PM

## 2023-04-11 NOTE — ANESTHESIA CARE TRANSFER NOTE
Patient: Arley Butt    Procedure: Procedure(s):  stealth assisted Right side deep brain stimulator placement, phase I & II combined, placement of right side deep brain stimulator electrode, target right ventral intermediate nucleus of the thalamus, with microelectrode recording and connection to existing generator/battery       Diagnosis: Essential tremor [G25.0]  Diagnosis Additional Information: No value filed.    Anesthesia Type:   MAC     Note:    Oropharynx: oropharynx clear of all foreign objects and spontaneously breathing  Level of Consciousness: awake  Oxygen Supplementation: nasal cannula  Level of Supplemental Oxygen (L/min / FiO2): 2  Independent Airway: airway patency satisfactory and stable  Dentition: dentition unchanged  Vital Signs Stable: post-procedure vital signs reviewed and stable  Report to RN Given: handoff report given  Patient transferred to: PACU    Handoff Report: Identifed the Patient, Identified the Reponsible Provider, Reviewed the pertinent medical history, Discussed the surgical course, Reviewed Intra-OP anesthesia mangement and issues during anesthesia, Set expectations for post-procedure period and Allowed opportunity for questions and acknowledgement of understanding      Vitals:  Vitals Value Taken Time   BP 94/67 04/11/23 1407   Temp     Pulse 56 04/11/23 1410   Resp 14 04/11/23 1410   SpO2 92 % 04/11/23 1410   Vitals shown include unvalidated device data.    Electronically Signed By: Diego Hoyos MD  April 11, 2023  2:12 PM

## 2023-04-11 NOTE — TELEPHONE ENCOUNTER
Svitlana, patient's daughter, called and left the writer a detailed message to call her back to reschedule an appointment for the patient.    Spoke to Svitlana and she stated the patient will be on vacation from 5/12-5/19. Svitlana stated she would like to reschedule the patient's initial programming appointments. The writer did note that the hospital canceled the patient's 30 day post-op CT head that was scheduled on 5/17 at 2:20 PM, at the [facility].    The patient was rescheduled to 6/7/23 at 12 PM with Dr. Plummer for his initial programming.    Spoke to Justine, Image , and informed her that the patient's CT head on 5/17 was canceled in error by a hospital staff member and needs to be rescheduled. Justine stated she is not able to do anything in regards to this since the order for the 30-day post-op CT head was used for the patient's CT head today during his surgery.    Justine then transferred the writer to the CT department at [facility] and the writer spoke to the Alonzo, the CT tech who canceled the patient's 30-day post CT head. The writer informed him this should not have been canceled and that a 30-day CT head is done after Deep Brain Stimulation surgery. Alonzo stated he did cancel the CT head scheduled on 5/17 and stated it was an error. The writer informed Alonzo it was not an error and was meant to be scheduled. Alonzo was informed he may have used the order written by Dr. Plummer and not the CT order written by Dr. Otero. Alonzo verbalized his error and stated a new order would need to entered.     A new CT head for the 30-day post-op was ordered.    Called Imaging and spoke to Virginia and Sosa stated the CT head cannot be scheduled since the patient is still admitted into the hospital. Will wait for the patient to be discharged, to reschedule the canceled 30-day CT head.

## 2023-04-11 NOTE — PROGRESS NOTES
Stereotaxic Neurosurgery Neurophysiology Summary    Name: Arley Butt  : 1950   MRN: [id number]   Surgery Date: 23  Surgery Performed:  1. Intraoperative microelectrode recording for guidance of deep brain stimulator lead placement. 2. Test microstimulation from the microelectrode. 3. Test macrostimulation from the microelectrode canula and from the implanted DBS lead.  Neurologist: Xena Tariq DO, Chato Ferguson MD, PhD  Neurosurgeon: Manuel Otero MD    Diagnosis: Essential Tremor    Target:  ventral intermediate (Vim) nucleus of the thalamus    Side: Right, second side     Procedure:   The surgical field was prepared as per the neurosurgeon's note. Microelectrodes were attached to the stereotactic frame and lowered through the brain with a calibrated microdrive. Two simultaneous microelectrode penetrations were made using the Romain-Gun device. Microelectrode recording was performed using the center (aimed 2 mm posterior to the anatomic target as determined by Stealth system planning) and 2 mm posterior positions of the Romain-Gun system. The anterior position of the Romain-Gun system would correlate with the initial anatomic target as defined by the Stealth station. The center and posterior Romain-Gun system positions were mapped in order to increase the likelihood of identifying the anterior border of the sensory (Vc) nucleus, which corresponds to the posterior border of the Vim nucleus.    Functional Physiologic Mappin hours    Mapping Equipment: [employer]    Microelectrode Mapping    Electrophysiological Mapping: The target was physiologically mapped using 1 pass, 2 tracks    Pass 1:    Mapping:   Center track: This revealed robust activity with somatosensory response corresponding to the shoulder with cellular activity at 4.61 mm above target and -1.08 mm below target. Neuronal activity consistent with VC activity was not observed.     Posterior track: This revealed  robust activity with somatosensory response corresponding to the elbow, shoulder, wrist, tongue with cellular activity between 7.53 mm and 0.713 mm above target. Neuronal activity consistent with VC activity involving tactile stimulation of the medial portion of the thumb was noted at -0.261 below target.    Microstimulation:  We then proceeded to do test micostimulation from the microelectrode tip from the center and posterior position tracks.    Center track: In the center position track at -3.16 mm below the target at 100 microamp, patient reported no paresthesia. At -2.07 mm below the target at 70 microamp, patient reported paresthesia involving the forearm and fingers.     Posterior track: In the posterior position track at -3.16 mm below at 40 microamp, patient reported paresthesia involving the finger. At -2.07 mm below at 40 microamp, patient reported paresthesia involving the fingers and arm. At -0.970 mm below target at  microamp, patient reported paresthesia in arm, fingers that was inconsistent with subsequent stimulations (yes with first stimulation then no with subsequent stimulations). At 0.046 mm above target at  microamp, patient reported paresthesia in arm, fingers that was inconsistent with subsequent stimulations (yes with first stimulation then no with subsequent stimulations).    Ring electrode stimulation: Test ring electrode stimulation was next performed at -3.2 mm below target and at 0.5-3.0 milliamps, 60  s, 130 Hz. No effect noted at 0.5 mA. At 1.0 mA, patient reported finger/hand tingling. The patient experienced capsular effect involving the hand, though very slight, at 3.0 mA.        Macrostimulation (DBS lead):    Lead:   Test macrostimulation from the BELTRAN canula in antreior track was performed at -1.5 mm below target.     Stimulation Parameters: Test macrostimulation from the lead was then performed with 1 negative, 567 positive, 60 microseconds, 130 Hz. Starting at 1.5  "milliamps to 4.0 milliamps, the patient reported transient upper extremity transient paresthesia which patient reported as \"tightness\". There was clear capsular effect involving the mouth at 4.5 milliamps. No ataxia observed with finger to nose.     Next we tested 567 negative, one positive, 60 microseconds, 130 Hz. At 3.0 milliamps, the patient reported transient arm and leg tightness and no ataxia..     Next we tested At 234 negative, 8 positive, 60 microseconds, 130 Hz. At 3.0 milliamps, the patient reported transient slight tightness in the leg at 3.0 milliamps. At 4.0 milliamps with cycling turned on (1 second on/ 1 second off), there were very slight and consistent contractions observed involving the leg; none observed at 3.0 milliamps with cycling on.     Outcome: The findings from the microstimulation and macrostimulation from the microelectrode and side effect threshold was thought to be reasonable and the lead was fastened into place with the tip at -1.5 mm below target using the anterior position track.    Lesion effect: No lesion effect was noticed after lead placement.     Comments:  During macrostimulation of the lead, the patient reported leg tightness (potentially capsular) at 3.5-4 mA. Of note, similar lower extremity transient \"tightness\" was observed on first side Deep Brain Stimulation placement which was thought to be paresthesias though unclear. Despite the reported leg symptoms, we decided against a medial rotation because patient's tremors appeared minimal on exam and the above symptoms were reported as minimal by the patient. After discussion, it was thought that adequate tremor control could likely be achieved via low pulse width and/or segmented leads. The alternative solution discussed was to rotate medially and it is unclear whether this move would yield improved tremor benefit or produce an undue side effect (such as ataxia). Of note, no ataxia was observed throughout macrostimulation " of the lead. Thus, lead rotation was not performed and lead dropped in anterior track at -1.5 mm below target (suspected bottom, based on mapping).     The patient tolerated the procedure without difficulty and there were no complications.    Xena Tariq DO   of Neurology   [facility]

## 2023-04-12 ENCOUNTER — TELEPHONE (OUTPATIENT)
Dept: NEUROLOGY | Facility: CLINIC | Age: 73
End: 2023-04-12

## 2023-04-12 VITALS
HEIGHT: 66 IN | DIASTOLIC BLOOD PRESSURE: 68 MMHG | WEIGHT: 201.5 LBS | SYSTOLIC BLOOD PRESSURE: 106 MMHG | HEART RATE: 87 BPM | BODY MASS INDEX: 32.38 KG/M2 | TEMPERATURE: 98.2 F | RESPIRATION RATE: 18 BRPM | OXYGEN SATURATION: 92 %

## 2023-04-12 LAB
ANION GAP SERPL CALCULATED.3IONS-SCNC: 11 MMOL/L (ref 7–15)
BUN SERPL-MCNC: 18.6 MG/DL (ref 8–23)
CALCIUM SERPL-MCNC: 8.9 MG/DL (ref 8.8–10.2)
CHLORIDE SERPL-SCNC: 107 MMOL/L (ref 98–107)
CREAT SERPL-MCNC: 1.2 MG/DL (ref 0.67–1.17)
DEPRECATED HCO3 PLAS-SCNC: 23 MMOL/L (ref 22–29)
ERYTHROCYTE [DISTWIDTH] IN BLOOD BY AUTOMATED COUNT: 13.6 % (ref 10–15)
GFR SERPL CREATININE-BSD FRML MDRD: 64 ML/MIN/1.73M2
GLUCOSE SERPL-MCNC: 106 MG/DL (ref 70–99)
HCT VFR BLD AUTO: 34.4 % (ref 40–53)
HGB BLD-MCNC: 11.1 G/DL (ref 13.3–17.7)
MAGNESIUM SERPL-MCNC: 1.8 MG/DL (ref 1.7–2.3)
MCH RBC QN AUTO: 30.2 PG (ref 26.5–33)
MCHC RBC AUTO-ENTMCNC: 32.3 G/DL (ref 31.5–36.5)
MCV RBC AUTO: 94 FL (ref 78–100)
PHOSPHATE SERPL-MCNC: 3.6 MG/DL (ref 2.5–4.5)
PLATELET # BLD AUTO: 150 10E3/UL (ref 150–450)
POTASSIUM SERPL-SCNC: 3.8 MMOL/L (ref 3.4–5.3)
RBC # BLD AUTO: 3.67 10E6/UL (ref 4.4–5.9)
SODIUM SERPL-SCNC: 141 MMOL/L (ref 136–145)
WBC # BLD AUTO: 8.7 10E3/UL (ref 4–11)

## 2023-04-12 PROCEDURE — 83735 ASSAY OF MAGNESIUM: CPT

## 2023-04-12 PROCEDURE — 36415 COLL VENOUS BLD VENIPUNCTURE: CPT

## 2023-04-12 PROCEDURE — 999N000128 HC STATISTIC PERIPHERAL IV START W/O US GUIDANCE

## 2023-04-12 PROCEDURE — 250N000011 HC RX IP 250 OP 636: Performed by: NEUROLOGICAL SURGERY

## 2023-04-12 PROCEDURE — 99238 HOSP IP/OBS DSCHRG MGMT 30/<: CPT

## 2023-04-12 PROCEDURE — 85027 COMPLETE CBC AUTOMATED: CPT

## 2023-04-12 PROCEDURE — 80048 BASIC METABOLIC PNL TOTAL CA: CPT

## 2023-04-12 PROCEDURE — 84100 ASSAY OF PHOSPHORUS: CPT

## 2023-04-12 PROCEDURE — 250N000013 HC RX MED GY IP 250 OP 250 PS 637

## 2023-04-12 RX ORDER — OXYCODONE HYDROCHLORIDE 5 MG/1
5 TABLET ORAL EVERY 4 HOURS PRN
Qty: 10 TABLET | Refills: 0 | Status: SHIPPED | OUTPATIENT
Start: 2023-04-12

## 2023-04-12 RX ADMIN — GABAPENTIN 300 MG: 300 CAPSULE ORAL at 07:56

## 2023-04-12 RX ADMIN — OXYCODONE HYDROCHLORIDE 5 MG: 5 TABLET ORAL at 06:55

## 2023-04-12 RX ADMIN — OXYCODONE HYDROCHLORIDE 5 MG: 5 TABLET ORAL at 10:46

## 2023-04-12 RX ADMIN — PANTOPRAZOLE SODIUM 40 MG: 40 TABLET, DELAYED RELEASE ORAL at 07:56

## 2023-04-12 RX ADMIN — CEFAZOLIN SODIUM 2 G: 2 INJECTION, SOLUTION INTRAVENOUS at 07:56

## 2023-04-12 RX ADMIN — TAMSULOSIN HYDROCHLORIDE 0.4 MG: 0.4 CAPSULE ORAL at 07:57

## 2023-04-12 RX ADMIN — ACETAMINOPHEN 975 MG: 325 TABLET, FILM COATED ORAL at 07:56

## 2023-04-12 RX ADMIN — OXYCODONE HYDROCHLORIDE 10 MG: 10 TABLET ORAL at 01:16

## 2023-04-12 RX ADMIN — SERTRALINE HYDROCHLORIDE 150 MG: 100 TABLET ORAL at 07:57

## 2023-04-12 RX ADMIN — POLYETHYLENE GLYCOL 3350 17 G: 17 POWDER, FOR SOLUTION ORAL at 07:57

## 2023-04-12 RX ADMIN — SENNOSIDES AND DOCUSATE SODIUM 1 TABLET: 50; 8.6 TABLET ORAL at 07:57

## 2023-04-12 RX ADMIN — FINASTERIDE 5 MG: 5 TABLET, FILM COATED ORAL at 07:57

## 2023-04-12 RX ADMIN — CARBOXYMETHYLCELLULOSE SODIUM 1 DROP: 10 GEL OPHTHALMIC at 07:58

## 2023-04-12 ASSESSMENT — ACTIVITIES OF DAILY LIVING (ADL)
ADLS_ACUITY_SCORE: 27
ADLS_ACUITY_SCORE: 26
ADLS_ACUITY_SCORE: 27
ADLS_ACUITY_SCORE: 27

## 2023-04-12 NOTE — TELEPHONE ENCOUNTER
Svitlana called the clinic today and reached our navigator TOMMY Brown. Stephanie asked that I call back concerning his Deep Brain Stimulation,

## 2023-04-12 NOTE — PLAN OF CARE
Status: S/p R side DBS phases 1&2 4/11/23  Vitals: Soft Bps with SBP in 90s, within parameters. Home CPAP @ bedside, contraindicative due to head incision and pin sites. On 1.5 L NC.   Neuros: AOx4. Strength 5/5 throughout. Denied N/T. Mild tremors in BUE.   IV: PIV SL between abx.   Labs/Electrolytes: WNL.   Resp/trach: LSC. Denied chest pain and SOB.   Diet: Regular.   Bowel status: LBM PTA.   : Voiding spontaneously.   Skin: Head incision covered with primapore, marked drainage x2. Anterior pin sites covered with primapore x2. Posterior pin sites DIONTE x2. Scab over groin area.   Pain: 9/10. C/o headache. Managed with scheduled tylenol and PRN oxycodone.   Activity: SBA + GB and walker.  Plan: Plan to discharge tomorrow after completion of abx

## 2023-04-12 NOTE — PLAN OF CARE
Status: S/p R side DBS phases 1&2 4/11/23  Vitals: VSS ex bradycardia   Neuros: A&Ox4. Denies n/t, strengths 5/5.    IV: PIV infusing NS at 100ml/hr  Labs/Electrolytes: On electrolyte replacement protocol  Resp/trach: LSC on RA. Pt using CPAP from home at night.   Diet: Regular diet   Bowel status: BS+. LBM 4/9/23  : Voiding spontaneously   Skin: Head incisions/pin site dressings CDI and marked.   Pain: Head incision pain managed with scheduled tylenol and prn oxycodone.   Activity: SBA GB W   Social: Daughter and son-in-law visited, involved in care   Plan: Plan to discharge tomorrow after completion of abx

## 2023-04-12 NOTE — TELEPHONE ENCOUNTER
I spoke with pt's daughter Svitlana and she confirmed that pt has not had any further issues with his DBS stimulation. Pt is doing well since his discharge earlier today. He had lunch and is now resting. Daughter understands that the dressings should stay on until tomorrow and pt may get the incisions wet on Friday. I will call tomorrow to check on pt's postop status. They have the clinic phone number and number to reach the on-call neurosurgery resident should there be any concerns after business hours/over the weekend. Nothing further at this time.

## 2023-04-12 NOTE — DISCHARGE SUMMARY
"[facility] Discharge Summary and Instructions    Arley Butt MRN# [id number]   Age: 72 year old YOB: 1950     Date of Admission:  4/11/2023  Date of Discharge::  4/12/2023  Admitting Physician:  Manuel Otero MD  Discharge Physician:  Manuel Otero MD          Admission Diagnoses:   Essential tremor [G25.0]          Discharge Diagnosis:     Essential tremor [G25.0]  S/p Deep brain stimulator placment     Clinically Significant Risk Factors Present on Admission            # Obesity: Estimated body mass index is 32.52 kg/m  as calculated from the following:    Height as of this encounter: 1.676 m (5' 6\").    Weight as of this encounter: 91.4 kg (201 lb 8 oz).  # Alcohol dependence  # BPH  # History of DVT  # Follicular lymphoma  # HTN  # GERD with esophagitis         Procedures:   4/11/23 - stealth assisted Right side deep brain stimulator placement, phase I & II combined, placement of right side deep brain stimulator electrode, target right ventral intermediate nucleus of the thalamus, with microelectrode recording and connection to existing generator/battery           Brief History of Illness:   70 y/o right handed gentleman with ET referred for neurosurgical consideration for DBS from the [facility]. See my prior note for details. In brief, he has had bilateral hand tremors, R>L for a few years, but it has significantly worsened over the last year or so. Has tried primidone and propranolol without benefit. It is interfering significantly with his daily living activities including eating, drinking, writing. Not on antiplatelets or anticoagulation. Patient was offered DBS stimulation electrode implantation procedure for his symptoms. He is now s/p left ViM DBS (BSci Genus R16 with two extensions) in 10/2022  His tremor programming was not quite where he had hoped it would be and he was interested in second side surgery. Patient has elected to undergo above-mentioned procedure.    "        Hospital Course:   Patient underwent above-mentioned procedure on 4/11/23. The operation was uncomplicated and he was admitted to the floor for routine post operative cares. Patient received 3 doses of Ancef post-operatively. He endorsed incisional pain, but denied any nausea or vision changes. On post operative day 1, he was ambulating, voiding without a dominguez, eating a regular diet, pain was well controlled and therefore he was discharged home.    Exam at discharge:    Temp:  [97.3  F (36.3  C)-98.6  F (37  C)] 98.2  F (36.8  C)  Pulse:  [52-87] 87  Resp:  [8-18] 18  BP: ()/(41-78) 106/68  SpO2:  [91 %-100 %] 92 %  I/O last 3 completed shifts:  In: 1100 [I.V.:1100]  Out: 2290 [Urine:2255; Blood:35]    Gen: Appears comfortable, NAD  Wound: Incision covered with dressing, some strikethrough present  Neurologic:  - Alert & Oriented to person, place, time, and situation  - Follows commands briskly  - Speech fluent, spontaneous. No aphasia or dysarthria.  - No gaze preference. No apparent hemineglect.  - PERRL, EOMI  - Strong eye closure, jaw clench, and cheek puff  - Face symmetric with sensation intact to light touch  - Tongue protrudes midline  - Trapezii and sternocleidomastoid muscles 5/5 bilaterally  - No pronator drift     Del Tr Bi Gr WF Gr   R 5 5 5 5 5 5   L 5 5 5 5 5 5    HF KF DF PF PF EHL   R 5 5 5 5 5 5   L 5 5 5 5 5 5     Reflexes 2+ throughout    Sensation intact and symmetric to light touch throughout            Discharge Medications:     Current Discharge Medication List      START taking these medications    Details   diphenhydrAMINE (BENADRYL) 25 MG capsule Take 1-2 capsules (25-50 mg) by mouth nightly as needed for sleep  Qty: 10 capsule, Refills: 0    Associated Diagnoses: Essential tremor; S/P deep brain stimulator placement      ondansetron (ZOFRAN ODT) 4 MG ODT tab Take 1 tablet (4 mg) by mouth every 6 hours as needed for nausea or vomiting  Qty: 10 tablet, Refills: 0    Associated  Diagnoses: Essential tremor; S/P deep brain stimulator placement      oxyCODONE (ROXICODONE) 5 MG tablet Take 1 tablet (5 mg) by mouth every 4 hours as needed for moderate pain  Qty: 10 tablet, Refills: 0    Associated Diagnoses: S/P deep brain stimulator placement      senna-docusate (SENOKOT-S/PERICOLACE) 8.6-50 MG tablet Take 1 tablet by mouth 2 times daily for 7 days  Qty: 14 tablet, Refills: 0    Associated Diagnoses: Essential tremor; S/P deep brain stimulator placement         CONTINUE these medications which have CHANGED    Details   acetaminophen (TYLENOL) 325 MG tablet Take 2 tablets (650 mg) by mouth every 4 hours as needed for other (For optimal non-opioid multimodal pain management to improve pain control.)  Qty: 30 tablet, Refills: 0    Associated Diagnoses: Essential tremor; S/P deep brain stimulator placement         CONTINUE these medications which have NOT CHANGED    Details   cholecalciferol 50 MCG (2000 UT) tablet TAKE ONE TABLET BY MOUTH DAILY FOR VITAMIN D SUPPLEMENT      cyproheptadine (PERIACTIN) 4 MG tablet Take 12 mg by mouth At Bedtime      finasteride (PROSCAR) 5 MG tablet Take 1 tablet by mouth daily      folic acid (FOLVITE) 1 MG tablet Take 1 tablet by mouth daily      gabapentin (NEURONTIN) 300 MG capsule Dosing as follows:    AM:  300 mg   Afternoon: 300 mg   Evenin mg (300 mg capsule x2)  Bedtime: 900 mg (300 mg capsule x3)      mirtazapine (REMERON) 30 MG tablet Take 15 mg by mouth At Bedtime      NONFORMULARY Nocturnal CPAP with 2L oxygen      pantoprazole (PROTONIX) 40 MG EC tablet Take 40 mg by mouth 2 times daily      polyethylene glycol (MIRALAX) 17 g packet Take 17 g by mouth daily  Qty: 30 packet, Refills: 0    Associated Diagnoses: Essential tremor      prazosin (MINIPRESS) 5 MG capsule Take 10 mg by mouth At Bedtime      sertraline (ZOLOFT) 50 MG tablet Take 150 mg by mouth every morning      simvastatin (ZOCOR) 20 MG tablet Take 20 mg by mouth At Bedtime       tamsulosin (FLOMAX) 0.4 MG capsule Take 1 capsule by mouth daily      WHITE PETROLATUM-MINERAL OIL OP Apply to eye At Bedtime      Carboxymethylcellulose Sodium 1 % GEL Apply 1 drop to eye 4 times daily      diphenhydrAMINE-acetaminophen (TYLENOL PM)  MG tablet Take 1-2 tablets by mouth nightly as needed for sleep      triamcinolone (KENALOG) 0.1 % external cream Apply 1 Film topically daily as needed      vitamin B-12 (CYANOCOBALAMIN) 1000 MCG tablet Take 1 tablet by mouth daily                     Discharge Instructions and Follow-Up:     Discharge diet: Regular   Discharge activity: You may advance activity as tolerated. No strenuous exercise or heay lifting greater than 10 lbs for 4 weeks or until seen and cleared in clinic.     Discharge follow-up: Follow-up with [facility]surgery [facility] on 4/24/23 for wound check/post-hospital evaluation.       Wound care: Ok to shower,however no scrubbing of the wound and no soaking of the wound, meaning no bathtubs or swimming pools. Pat dry only. Leave wound open to air.  Patient to have wound checked 2 weeks following surgery.    Wound location: Scalp  Closure technique: Suture  Dressing needs: None  Post-op care at follow-up: Keep dry and clean         Please call if you have:  1. increased pain, redness, drainage, swelling at your incision  2. fevers > 101.5 F degrees  3. with any questions or concerns.  You may reach the Neurosurgery clinic at [phone] during regular work hours. ER at [phone].    and ask for the Neurosurgery Resident on call at [phone], during off hours or weekends.         Discharge Disposition:     Discharged to home        Corine Mckoy PA-C  Neurosurgery Department  Pager: [phone]

## 2023-04-12 NOTE — TELEPHONE ENCOUNTER
"Arley had RVIM Deep Brain Stimulation placement with the lead extension connected to his existing battery yesterday. Svitlana checked his Deep Brain Stimulation system and it says stimulation OFF. However she thought it was suppose to be turned back on. She reports Dell Brown helped with turning his system off yesterday.    Called Felipe Brown, [employer] Representative. He reports he thought the system was turned back on and that it should be on. Svitlana turned the system back on with the patient . When going to lock the screen by pressing the lock button the screen became red and said \"remote error\" with an exclamation point in the middle with a triangle around it. She bypassed the screen and checked his Deep Brain Stimulation again, it is on. Reported error message to Felipe Brown who said it is not anything concerning (likely pressed more than one button). He will look into it further however since he has not seen this error message before.  "

## 2023-04-12 NOTE — TELEPHONE ENCOUNTER
Svitlana called back and stated she spoke to Dr. Otero and was informed he did not want the patient to wait until 6/7/23 for the initial programming. Svitlana stated she was told by Dr. Otero to call the writer to coordinate the Initial programming on 4/24 when the patient comes in for his post-op check. Svitlana was informed that the 6/7 is the soonest outside of the original date of 5/17/23 and that Dr. Plummer is not in the week of 4/24/23.    Svitlana stated she will call and talk to the patient's wife to confirm if they'd like to keep 6/7 or switch back to 5/17 as originally scheduled.    Will wait for Svitlana's return call on which date they would like.

## 2023-04-12 NOTE — PHARMACY-ADMISSION MEDICATION HISTORY
Pharmacist Admission Medication History    Admission medication history is complete. The information provided in this note is only as accurate as the sources available at the time of the update.    Medication reconciliation/reorder completed by provider prior to medication history? No    Information Source(s): Patient, Family member and med list via in-person    Pertinent Information: Patient was here with his daughter. She provided a med list for cross-reference.    Changes made to PTA medication list:    Added: None    Deleted:   o White patrolatum oil  o triamcinolone cream    Changed: None    Allergies reviewed with patient and updates made in EHR: yes    Medication History Completed By: Flavio Cody [facility] 2023 11:26 AM    PTA Med List   Medication Sig Last Dose     [START ON 2023] acetaminophen (TYLENOL) 325 MG tablet Take 2 tablets (650 mg) by mouth every 4 hours as needed for other (For optimal non-opioid multimodal pain management to improve pain control.)      Carboxymethylcellulose Sodium 1 % GEL Apply 1 drop to eye 4 times daily 2023     cholecalciferol 50 MCG (2000 UT) tablet TAKE ONE TABLET BY MOUTH DAILY FOR VITAMIN D SUPPLEMENT Past Week     cyproheptadine (PERIACTIN) 4 MG tablet Take 12 mg by mouth At Bedtime 2023     diphenhydrAMINE (BENADRYL) 25 MG capsule Take 1-2 capsules (25-50 mg) by mouth nightly as needed for sleep      diphenhydrAMINE-acetaminophen (TYLENOL PM)  MG tablet Take 1-2 tablets by mouth nightly as needed for sleep 2023     finasteride (PROSCAR) 5 MG tablet Take 1 tablet by mouth daily 2023     folic acid (FOLVITE) 1 MG tablet Take 1 tablet by mouth daily Past Week     gabapentin (NEURONTIN) 300 MG capsule Dosing as follows:    AM:  300 mg   Afternoon: 300 mg   Evenin mg (300 mg capsule x2)  Bedtime: 900 mg (300 mg capsule x3) 2023     mirtazapine (REMERON) 30 MG tablet Take 15 mg by mouth At Bedtime 2023     NONFORMULARY  Nocturnal CPAP with 2L oxygen Past Week     ondansetron (ZOFRAN ODT) 4 MG ODT tab Take 1 tablet (4 mg) by mouth every 6 hours as needed for nausea or vomiting      oxyCODONE (ROXICODONE) 5 MG tablet Take 1 tablet (5 mg) by mouth every 4 hours as needed for moderate pain      pantoprazole (PROTONIX) 40 MG EC tablet Take 40 mg by mouth 2 times daily 4/11/2023     polyethylene glycol (MIRALAX) 17 g packet Take 17 g by mouth daily Past Week     prazosin (MINIPRESS) 5 MG capsule Take 10 mg by mouth At Bedtime 4/11/2023     senna-docusate (SENOKOT-S/PERICOLACE) 8.6-50 MG tablet Take 1 tablet by mouth 2 times daily for 7 days      sertraline (ZOLOFT) 50 MG tablet Take 150 mg by mouth every morning 4/11/2023     simvastatin (ZOCOR) 20 MG tablet Take 20 mg by mouth At Bedtime 4/11/2023     tamsulosin (FLOMAX) 0.4 MG capsule Take 1 capsule by mouth daily 4/11/2023

## 2023-04-13 NOTE — TELEPHONE ENCOUNTER
Spoke to Jean at [facility] and the patient was scheduled for his CT head on 6/7/23 at 11:20 AM. A new initial programming letter was sent to the patient's MyChart.

## 2023-04-13 NOTE — TELEPHONE ENCOUNTER
Svitlana called back and left the writer a detailed message stating the patient would like to keep the 6/7/23 appointment for Initial Programming.

## 2023-04-14 ENCOUNTER — PATIENT OUTREACH (OUTPATIENT)
Dept: NEUROSURGERY | Facility: CLINIC | Age: 73
End: 2023-04-14

## 2023-04-14 NOTE — PROGRESS NOTES
Late entrNeurosurgery Discharge Coordination Note     Attending physician: Dr. Otero  Surgery performed: Right side deep brain stimulator placement, phase I & II combined, placement of right side deep brain stimulator electrode, target right ventral intermediate nucleus of the thalamus, with microelectrode recording and connection to existing generator/battery  Date of Discharge: 4/12/2023  Discharge to: Home     Current status: Spoke with pt's daughter Svitlana. Pt was napping at the time of the call. Pt stayed at daughter's house the first night and she drove him home today. Upon getting out of the car to walk into the house, daughter said he was lightheaded and she had to steady him against the car so that he wouldn't fall. He was holding a cup at the time and dropped it without seeming to be aware. He also wasn't immediately responsive to questions for a moment. When she brought him into the house the home health nurse took his BP and it was 98/64. It then went up to 121/62 and pt seemed to be doing better.     Pain is manageable. Taking 1 oxycodone in the morning as needed and 1 at night as needed and Tylenol during the day. Denies increased tenderness, or elevated temp. Daughter said there is some drainage on both dressings and she would like to leave them on until the morning of 4/14. She will call if drainage continues or if she notes redness, swelling, or incisions opening. She thinks the drainage is from pt laying on the incisions primarily. Reports Incision CDI without signs of infection.  Denies current bowel or bladder issues.    Daughter says that when pt walks he tends to veer to the right. He was also doing this in the hospital. She also said his memory is not quite at baseline. She will continue to monitor and call with concerns.     Discharge instructions and medications reviewed with patient.  Follow up appointments/imaging/tests needed: 2 week post op with NP Thi Ramirez on 4//24 at 12:30.      RN triage/on call number given: [phone]/ [phone]

## 2023-04-20 NOTE — OP NOTE
PATIENT NAME:       Arley Butt  YOB: 1950  MRN:                         [id number]     DATE OF PROCEDURE: 04/11/2023     PREOPERATIVE DIAGNOSIS:    Essential tremor [G25.0]     POSTOPERATIVE DIAGNOSIS:    Essential tremor [G25.0]     PROCEDURES PERFORMED:  1.  Placement of CRW stereotactic head frame.   2.  Stereotactic neuronavigation planning and CT of the head.   3.  Stereotactic neuronavigation using the Homejoy system for surgical planning, targeting and approach.   4.  Right sided deep brain stimulator placement, phase I, placement of right deep brain stimulator electrode, target is the right ViM thalamus  6. Use of intraoperative microelectrode recordings   7. Use of intraoperative fluoroscopy  8. Phase II of deep brain stimulation procedure- connection of the right ViM electrode with the existing extension wire- already connected to the battery     STAFF SURGEON: Manuel Otero MD     RESIDENT SURGEONS:None available for this case     ANESTHESIA: Monitored anesthetic care     ESTIMATED BLOOD LOSS: 35 mL     IMPLANTS:      Implant Name Type Inv. Item Serial No.  Lot No. LRB No. Used Action   KIT AMILCAR HOLE COVER VERCISE DBS [id number] - [id number] Metallic Hardware/[place] KIT AMILCAR HOLE COVER VERCISE DBS [id number]   [employer] [id number] Right 1 Implanted   DBS DIRECTIONAL LEAD STERILE KIT 45CM [id number]-[id number] - [id number] Leads DBS DIRECTIONAL LEAD STERILE KIT 45CM [id number]   [employer] N/A Right 1 Implanted   IMP SCR SYN MATRIX LOW PRO 1.5X04MM SELF DRILL [id number] - [id number] Metallic Hardware/[place] IMP SCR SYN MATRIX LOW PRO 1.5X04MM SELF DRILL [id number]   [employer] N/A Right 4 Implanted   Titanium straight plates         N/A Right 2 Implanted           EXPLANTS: None     DRAINS: None     FINDINGS:Anterior Romain Gun position, depth -1.5 mm. Impedances normal.     COMPLICATIONS: None     INDICATIONS:   Arley Butt is a 71 y/o gentleman  s/p left ViM DBS (BSci Genus R16 with two extensions) last year who presented for neurosurgical consultation regarding placing right ViM lead. He has gotten benefit from his left ViM lead although it has been somewhat inconsistent, but wants to proceed with the right ViM lead now to gain better use of his left hand. He was approved for second side surgery after undergoing repeat neuropsych testing. We discussed the risks, benefits and alternatives to surgery and he wished to proceed with surgery.     DESCRIPTION OF PROCEDURE:  CRW head frame placement and stereotactic neuronavigation.      The patient was identified in the preoperative holding area. Consent was obtained. Four pin sites, 2 in the front and 2 in the back of the head, were cleaned with chloraprep. Then, the CRW stereotactic head frame was placed on the [facility] with 4 pins for fixation. 25cc of local anesthetic was injected.  Care was also taken so that the fiducial box would fit over the head and the frame.  Once the head frame was on, the fiducial box was placed with minimal interference from the forehead.      After the CRW stereotactic head frame was placed, the patient was taken directly to the CT scanner and a CT head stereotactic protocol was obtained.  Subsequently, the patient was taken back to the operating room. He was then placed supine on the operating room bed with the CRW head frame fixated to the bed using the Vazquez arm.  All pressure points were well padded.  Care was taken to make sure that the neck was in a neutral position. He was positioned in the reclined beach chair position.      At this time, attention was turned to the neuronavigation imaging that was obtained. The Stealth neuronavigation device was loaded with the CT head that had just been obtained.  The device also had an MRI of the head stereotactic protocol that was obtained prior to surgery.  CT of the head was merged with the previously obtained MRI of the brain.  The  merge demonstrated good coherence/registration.  Then, using this merged image neuronavigation was used to stereotactically target the right globus pallidus internus by anatomical localization.  Using stereotactic coordinates and the X, Y, and Z as well as the ring and arc angles for the CRW frame were obtained.  The entry into the skull as well as the trajectory of the electrode were checked with the probe's eye view to avoid any sulci, ventricles, or vascular structures.     The stereotactic coordinates for the right GPi were then transferred to the CRW stereotactic targeting apparatus as well as the phantom base.  The coordinates were confirmed and double checked for accuracy.  Accuracy was also confirmed using phantom base.      The coordinates were  X= +11.4,   Y= -1.9   Z = -17.9,   Ring angle = 67.3 degrees anterior  Arc angle = 23.8 degrees to the right     At this time, attention was turned to the patient, using a hair clipper, the surgical area was shaved.  Then the surgical area was prepped and draped in routine sterile fashion.       A timeout was then performed confirming the patient's identity, procedure to be performed, side and site of surgery identified, imaging corresponding with the patient and administration perioperative prophylactic antibiotic.     Right electrode placement: Target right globus pallidus internus.      The CRW targeting apparatus was attached to the head frame on top of the sterile drapes.  The entry point was marked on the scalp on the right side.  A C-shaped incision was made with a #10 blade, after the area was injected with local anesthetic, 1% Lidocaine with epinephrine and 1/4% Marcaine plain, 50:50 mix.  A total of 15 mL of the above mentioned local anesthetic was used throughout the entirety of the procedure.  The incision was carried down to the pericranium.  Hemostasis was obtained using bipolar cautery.  A  Thin layer of pericranial tissue was left behind on the scalp  and the skin flap was reflected posteriorly with a 2-0 vicryl suture.       At this time, the targeting apparatus was positioned and the entry point to the skull was marked.  The area of the intended jazlyn hole was cleaned and pericranial tissue removed.  Then using a 5 mm cutter drill, a jazlyn hole was created over this entry point to expose the dura. The area was vigorously irrigated and bone wax used to control the bone bleeding.  Dura was coagulated with bipolar cautery for hemostasis. The electrode fixation cover was fixed to the skull using two screws.  Care was taken to overlap the pericranium over the edge of the cover to provide a smooth tissue transition.      Then, the electrode drive was attached to the targeting apparatus and the guiding cannulas were placed in the center and posterior Romain Gun positions. The entry into the dura was again checked. The field remained dry at this point. Then, using an 11 blade, entry was made into the dura. Bipolar cautery was used to shrink the dural leaflets. Bipolar cautery was then used to coagulate the chapin in our intended corticectomy.  No cortical bleeding was noted. Gelfoam was used to pack the dura while we arranged the cannula intended for the next step to enter through the corticectomy. This was performed with no issues.     At this time, the Romain Gun center and posterior electrode guide cannulas were advanced slowly until it was fully inserted. No abnormal resistance was present, the inner portions of the cannulas were removed, cannulas were irrigated and no blood was observed at the exit points. Gelfoam and DuraSeal were placed in the jazlyn hole site to diminish CSF egress. Microelectrode was brought into the field and inserted, and fixed in the anterior, posterior and lateral Romain Gun positions. Microelectrode was then connected to the Micro drive and the ground was placed around the outer cannula. The Micro drive was set to 10mm above target.     Our Neurology  colleagues then proceeded with the microelectrode recording part of the procedure. The optimal position was determined to be the anterior Romain Gun position at depth of 1.5 mm below target. See Dr. Tariq's note for details on the microelectrode recordings.      Microelectrodes were disconnected and removed. The final DBS lead was inserted into the anterior Romain gun position with the directional contacdt facing anteriorly. Impedances were normal. C-arm fluoroscopy was used to confirm positioning of the electrode with every manipulation. The cannula was pulled back and Gelfoam and DuraSeal were placed on the entry site to provide a seal and to minimize CSF leak and air entry. Electrode stylet was removed.  The electrode was secured in place with the Pac-man cover and jazlyn hole cover. Fluoroscopy was used to confirm final DBS lead placement. The electrode clear cover was placed and secured to the DBS electrode end.      Phase II DBS: Connection of electrode with the existing connector of the extension wire in the left subgaleal pocket     A linear incision was made in the left parietal region overlying the tunneled extension wire. A 5mm cutter drill bit was used to drill a trough in the bone for the intended connection placement.The right DBS lead was tunneled in the subgaleal space to exit at the left parietal incision. We delivered the connector from the incision and connected the extension wire lead with the electrode. The connector was buried in the trough and secured with two titanium dog bone plates and 4 mm screws. All impedances were found to be within normal limits. Now the connector and the excess wire were buried back into the left parietal area subgaleal pocket. The surgical area was generously irrigated.  Hemostasis was also obtained. We completed the galeal closure of both incisions with 3-0 vicryl sutures. The skin at both incisions was reapproximated using 4-0 Monocryl suture in a running  fashion.  Wounds were cleaned, dried, prepped with ChoraPrep and bacitracin was applied.      Removal of the head frame and end of procedure:     First, the stereotactic targeting apparatus was removed.  Then, the sterile drapes were removed.  Subsequently, the patient was taken out of the CRW head frame.  The four pin sites were clean and dry and no active bleeding was noted.  Antibiotic ointment was applied to the pin sites.  Small Primapore dressings were applied to the anterior pin sites.     At the end of the case, all counts including needle, sponge and instrument counts were correct x 2.  There were no complications.

## 2023-04-26 ENCOUNTER — OFFICE VISIT (OUTPATIENT)
Dept: NEUROSURGERY | Facility: CLINIC | Age: 73
End: 2023-04-26
Attending: NURSE PRACTITIONER
Payer: COMMERCIAL

## 2023-04-26 ENCOUNTER — HOSPITAL ENCOUNTER (OUTPATIENT)
Dept: CT IMAGING | Facility: CLINIC | Age: 73
Discharge: HOME | End: 2023-04-26
Attending: NURSE PRACTITIONER | Admitting: NURSE PRACTITIONER
Payer: COMMERCIAL

## 2023-04-26 VITALS
DIASTOLIC BLOOD PRESSURE: 69 MMHG | HEIGHT: 65 IN | WEIGHT: 203.26 LBS | SYSTOLIC BLOOD PRESSURE: 116 MMHG | HEART RATE: 121 BPM | BODY MASS INDEX: 33.87 KG/M2

## 2023-04-26 DIAGNOSIS — G93.6 VASOGENIC EDEMA (H): Primary | ICD-10-CM

## 2023-04-26 DIAGNOSIS — G25.0 ESSENTIAL TREMOR: ICD-10-CM

## 2023-04-26 DIAGNOSIS — G25.2 OTHER SPECIFIED FORMS OF TREMOR: ICD-10-CM

## 2023-04-26 LAB — RADIOLOGIST FLAGS: ABNORMAL

## 2023-04-26 PROCEDURE — G0463 HOSPITAL OUTPT CLINIC VISIT: HCPCS | Performed by: NURSE PRACTITIONER

## 2023-04-26 PROCEDURE — 70450 CT HEAD/BRAIN W/O DYE: CPT | Mod: 26 | Performed by: RADIOLOGY

## 2023-04-26 PROCEDURE — 70450 CT HEAD/BRAIN W/O DYE: CPT

## 2023-04-26 PROCEDURE — 99024 POSTOP FOLLOW-UP VISIT: CPT | Performed by: NURSE PRACTITIONER

## 2023-04-26 RX ORDER — DEXAMETHASONE 2 MG/1
TABLET ORAL
Qty: 20 TABLET | Refills: 0 | Status: SHIPPED | OUTPATIENT
Start: 2023-04-26

## 2023-04-26 NOTE — PATIENT INSTRUCTIONS
You met with Pediatric Neurosurgery at the [facility]    DENICE Meng Dr., Dr., NP      Pediatric Appointment Scheduling and Call Center:   [phone]    Nurse Practitioner  [phone]    Mailing Address  [address] [address]    Street Address   [address] [place] [address]    Fax Number  [phone]    For urgent matters that cannot wait until the next business day, occur over a holiday and/or weekend, report directly to your nearest ER or you may call [phone] and ask to page the Pediatric Neurosurgery Resident on call.

## 2023-04-26 NOTE — NURSING NOTE
"Chief Complaint   Patient presents with     Follow Up       Vitals:    04/26/23 1311   BP: 116/69   BP Location: Right arm   Patient Position: Sitting   Cuff Size: Adult Regular   Pulse: (!) 121   Weight: 203 lb 4.2 oz (92.2 kg)   Height: 5' 5.39\" (166.1 cm)     Patient MyChart Active? Yes  If no, would they like to sign up? N/A    Has patient signed a Consent to Communicate form to discuss health information with guardians if applicable? Does not apply     Does patient need PHQ-2 completed today? No    Depression Response    Patient completed the PHQ-9 assessment for depression and scored >9? Does not apply   Question 9 on the PHQ-9 was positive for suicidality? Does not apply   Does patient have current mental health provider? Does not apply     I personally notified the following: clinic nurse     Jocelyn Dixon, EMT  April 26, 2023  "

## 2023-05-01 ENCOUNTER — TELEPHONE (OUTPATIENT)
Dept: NEUROSURGERY | Facility: CLINIC | Age: 73
End: 2023-05-01

## 2023-05-02 ENCOUNTER — TELEPHONE (OUTPATIENT)
Dept: NEUROSURGERY | Facility: CLINIC | Age: 73
End: 2023-05-02

## 2023-05-02 DIAGNOSIS — Z96.89 S/P DEEP BRAIN STIMULATOR PLACEMENT: Primary | ICD-10-CM

## 2023-05-02 NOTE — TELEPHONE ENCOUNTER
Called to follow up and see how Arley was doing. I spoke with his daughter Svitlana. She notes that Arley continues with confusion, and has a harder time processing activities that used to come naturally to him. His daughter notes that when he plays cribbage, he has a hard time focusing and needs to really think about the game in order to play, where he used to play this all the time. She states he continues ambulating with walker. She notes prior to Friday he was very withdrawn and introverted, but notes since Friday he has been a bit more himself. He continues on his Decadron taper    Daughter notes that he has not had any fevers, no redness, swelling or drainage from his incision. He has been cleaning it everyday and has been wearing a clean bandana over it as to keep him from scratching.     He will be seeing primary care to address his low blood pressure, which his daughter states has been in the 70s-90s, but typically runs low, HR have been stable per daughter 60s-90s. He will also be undergoing lab tests today per PCP.

## 2023-05-02 NOTE — TELEPHONE ENCOUNTER
Followed up with pt's daughter. We will try to get the MRI scheduled before pt goes on vacation if possible (5/12 - 5/19). Dr. Otero said that if the family feels comfortable with pt going on vacation it is probably OK. If they have emergent concerns while away, they will go to the ED or call 911 if needed. Friends or family will be with pt and his wife for all but one day of the trip. Pt can follow up in clinic with Dr. Otero when he returns from his trip. However, looking at Dr. Otero's schedule, he doesn't have many clinic days open. I will see if another provider can see him at around 6-8 weeks postop. Daughter expressed understanding. Nothing further at this time.

## 2023-05-02 NOTE — TELEPHONE ENCOUNTER
Spoke with pt's daughter Svitlana to let her know that Dr. Otero would like pt to have an MRI without and with contrast. He will need a neurology nurse appt before the MRI to interrogate his DBS battery. I will follow up with scheduling to get these appts scheduled.     Daughter asked if this must be done right away, if the imaging and follow-up appt with Dr. Otero can wait until after pt returns from his timeshare vacation from 5/12 - 5/19, or if the pt should cancel the timeshare. I'll follow-up with Dr. Otero about the timing and get back to her. She is in agreement with plan. Nothing further at this time.

## 2023-05-03 NOTE — TELEPHONE ENCOUNTER
Called and got the patient scheduled for an MRI brain on 5/10/23 at 10:45 AM, at [facility].    Called Svitlana and informed her of the MRI date as well as scheduled a nurse's visit with a DBS nurse, to complete MRI eligibility on the same day as the MRI, at 9:30 AM. Svitlana was reminded to have the patient bring his patient  as well as have his battery fully charged. Svitlana verbalized her understanding of this and had no further questions.

## 2023-05-10 ENCOUNTER — DOCUMENTATION ONLY (OUTPATIENT)
Dept: NEUROLOGY | Facility: CLINIC | Age: 73
End: 2023-05-10

## 2023-05-10 ENCOUNTER — TELEPHONE (OUTPATIENT)
Dept: NEUROSURGERY | Facility: CLINIC | Age: 73
End: 2023-05-10

## 2023-05-10 ENCOUNTER — ALLIED HEALTH/NURSE VISIT (OUTPATIENT)
Dept: NEUROLOGY | Facility: CLINIC | Age: 73
End: 2023-05-10
Payer: COMMERCIAL

## 2023-05-10 ENCOUNTER — HOSPITAL ENCOUNTER (OUTPATIENT)
Dept: MRI IMAGING | Facility: CLINIC | Age: 73
Discharge: HOME | End: 2023-05-10
Attending: NEUROLOGICAL SURGERY | Admitting: NEUROLOGICAL SURGERY
Payer: COMMERCIAL

## 2023-05-10 ENCOUNTER — TRANSFERRED RECORDS (OUTPATIENT)
Dept: HEALTH INFORMATION MANAGEMENT | Facility: CLINIC | Age: 73
End: 2023-05-10

## 2023-05-10 DIAGNOSIS — Z96.89 S/P DEEP BRAIN STIMULATOR PLACEMENT: ICD-10-CM

## 2023-05-10 DIAGNOSIS — G25.0 ESSENTIAL TREMOR: Primary | ICD-10-CM

## 2023-05-10 PROCEDURE — 70553 MRI BRAIN STEM W/O & W/DYE: CPT

## 2023-05-10 PROCEDURE — A9585 GADOBUTROL INJECTION: HCPCS | Performed by: NEUROLOGICAL SURGERY

## 2023-05-10 PROCEDURE — 95970 ALYS NPGT W/O PRGRMG: CPT

## 2023-05-10 PROCEDURE — 255N000002 HC RX 255 OP 636: Performed by: NEUROLOGICAL SURGERY

## 2023-05-10 PROCEDURE — 70553 MRI BRAIN STEM W/O & W/DYE: CPT | Mod: 26 | Performed by: STUDENT IN AN ORGANIZED HEALTH CARE EDUCATION/TRAINING PROGRAM

## 2023-05-10 RX ORDER — GADOBUTROL 604.72 MG/ML
9 INJECTION INTRAVENOUS ONCE
Status: COMPLETED | OUTPATIENT
Start: 2023-05-10 | End: 2023-05-10

## 2023-05-10 RX ADMIN — GADOBUTROL 9 ML: 604.72 INJECTION INTRAVENOUS at 11:09

## 2023-05-10 NOTE — TELEPHONE ENCOUNTER
"Spoke with pt's daughter Svitlana. I let her know that Dr. Otero reviewed the photos that neurology RNCC Margarita took this morning. Per Dr. Otero, the incisions do not look infected. It looks like a scab fell off the parietal incision. Emphasized the importance of pt refraining from scratching and picking at the incisions. Svitlana said the scab was present when she saw the pt yesterday but she thinks he may have felt a suture in the area and started to pick at it when no one was watching.     Svitlana said pt's status has not improved from her observations. She said he doesn't remember that he should be avoiding bending, lifting, twisting, etc., so he needs to be reminded of his restrictions. He then becomes agitated and \"pouty, like a toddler.\" He is having problems with balance and doesn't remember to use the walker. He fell today outside the clinic building. He scraped his knees per Svitlana but did not strike his head.     Svitlana said he seems slow to process information, has word-finding difficulty and cannot follow multi-step instructions, such as \"go into the kitchen, pour a cup of coffee, and put some cream in it.\" This would confuse him. He can't remember how to play games he generally would play each week prior to the 4/11/23 surgery.     Pt's last dose of decadron was today. I will notify Dr. Otero about the above. Dr. Otero will review pt's MRI and I will follow-up with any further recommendations. Nothing further at this time.   "

## 2023-05-10 NOTE — PROGRESS NOTES
Arley comes into clinic today at the request of Dr. Plummer, ordering provider for follow-up visit to further evaluate his Deep Brain Stimulation system. This service provided today was under the supervising provider of the day Dr. Plummer, who was available if needed.     Reason for visit: DEEP BRAIN STIMULATION Interrogation   Time spent on visit: 15 minutes    Interrogated patient's DBS Genus battery. Patient has BVIM. All impedances were checked and were WNL. See neuromodulation sheets scanned. Patient informed.    LVIM Therapy impedance    Contacts used = C+/L3_5-,7-  (Monopolar)  Current Setting = 3    Patient   No range    Program used 1    RVIM Therapy impedance =    Contacts used = N/A - Not yet programmed  Current Setting = N/A    Model [id number]    Battery voltage 4.00  Program used 1    Patient and daughter noted one of the scabs opened up on top of his head. Scab/wound is on top of his head towards the left side and is about 1/2 long. C/D/I, no redness or swelling and wound appears to be superficial. Lana Rodriguez, Neurosurgery nurse notified.    Margarita Rowan, ANA     Movement Disorder Care Coordinator  [facility]

## 2023-05-16 ENCOUNTER — DOCUMENTATION ONLY (OUTPATIENT)
Dept: NEUROLOGY | Facility: CLINIC | Age: 73
End: 2023-05-16

## 2023-05-16 NOTE — PROGRESS NOTES
Received picture from patient   Records Date of service: 5/16/23  Copy has been sent to scanning and encounter routed to specialty nurse for review.

## 2023-05-19 ENCOUNTER — TELEPHONE (OUTPATIENT)
Dept: NEUROSURGERY | Facility: CLINIC | Age: 73
End: 2023-05-19

## 2023-05-20 ENCOUNTER — HEALTH MAINTENANCE LETTER (OUTPATIENT)
Age: 73
End: 2023-05-20

## 2023-06-05 ENCOUNTER — TELEPHONE (OUTPATIENT)
Dept: NEUROSURGERY | Facility: CLINIC | Age: 73
End: 2023-06-05

## 2023-06-05 NOTE — TELEPHONE ENCOUNTER
Pt's daughter called to let us know he has been having bilateral leg swelling since 6/2/23 which makes it difficult for him to walk. He is getting an ultrasound today and they should know more by 2-3 pm today. Pt is currently scheduled for DBS programming with Dr. Plummer on 6/7 and daughter wants to know if he should keep this appointment.     Additionally, daughter reports that pt had had some shortness of breath and had a CT abdomen last week. A kidney cyst and groin hernia were found.    Will message neurology with request to follow up with daughter. Nothing further at this time.

## 2023-06-06 NOTE — TELEPHONE ENCOUNTER
Informed Svitlana he can come in ON his medication (gabapetnin). She asks about food and if he should not eat. Oka to eat prior, no restrictions.

## 2023-06-06 NOTE — PROGRESS NOTES
Department of Neurology  Movement Disorders Division   DBS Follow-up Note    Patient: Arley Butt  MRN: [id number]   : 1950   Date of Visit: 2023    Diagnosis: Essential tremor  DBS Target(s): Bilateral VIM  Date(s) of DBS Lead Placement: L 2022, R 2023  Date(s) of IPG Placement: L chest 10/4/2022  Device: StyleQ      Chief Complaint:  Arley Butt is a 72 year old male who returns to clinic for follow up of ET status post bilateral VIM DBS.  He was last seen 3/29/2023 at which time LVIM directional programming was performed.    Interval History:  Having LE edema. Thought to be related to kidney dysfunction and venous insufficiency.  [facility] gabap[facility] to pregabalin based upon GFR of 48 on blood work.  His daughter thinks that he takes this for tremor and nightmares.    Since the last DBS programming, his writing has improved.  With the second surgery, there has been a decline in his cognition.  CTH 3 weeks post op showed vickey-lead edema and small SDH.  He was given dexamethasone.  Symptoms are stable.    There has been improvement in his left hand tremor since surgery.    His voice is also more gravelly.       Tremor ADL Scale  1. Speaking 0 (0) Normal   2. Feeding with a spoon 0 (0) Normal   3. Drinking from a glass 0 (0) Normal   4. Hygiene 0 (0) Normal   5. Dressing 0 (0) Normal   6. Pouring 1 (1) Slightly abnormal. Tremor is present but does not interfere with pouring.   7. Carrying food trays, plates or similar items 0 (0) Normal   8. Using keys 0 (0) Normal   9. Writing 1 (1) Slightly abnormal. Tremor present but does not interfere with writing.   10. Working 0 (0) Normal   11. Overall disability with the most affected task 1 (1) Slightly abnormal. Tremor present but does not affect task.   Name of most affected task Writing Writing   12. Social impact 0 (0) Normal   ADL TOTAL 3    Prior: 25 on 3/29/2023      Related Medications            Gabapentin 300mg 2 1 2 3   Last taken today      Medications:  Current Outpatient Medications   Medication Sig Dispense Refill     Carboxymethylcellulose Sodium 1 % GEL Apply 1 drop to eye 4 times daily       cholecalciferol 50 MCG (2000 UT) tablet TAKE ONE TABLET BY MOUTH DAILY FOR VITAMIN D SUPPLEMENT       cyproheptadine (PERIACTIN) 4 MG tablet Take 12 mg by mouth At Bedtime       diphenhydrAMINE-acetaminophen (TYLENOL PM)  MG tablet Take 1-2 tablets by mouth nightly as needed for sleep       finasteride (PROSCAR) 5 MG tablet Take 1 tablet by mouth daily       folic acid (FOLVITE) 1 MG tablet Take 1 tablet by mouth daily       gabapentin (NEURONTIN) 300 MG capsule Dosing as follows:    AM:  300 mg   Afternoon: 300 mg   Evenin mg (300 mg capsule x2)  Bedtime: 900 mg (300 mg capsule x3)       mirtazapine (REMERON) 30 MG tablet Take 15 mg by mouth At Bedtime       NONFORMULARY Nocturnal CPAP with 2L oxygen       pantoprazole (PROTONIX) 40 MG EC tablet Take 40 mg by mouth 2 times daily       polyethylene glycol (MIRALAX) 17 g packet Take 17 g by mouth daily 30 packet 0     prazosin (MINIPRESS) 5 MG capsule Take 10 mg by mouth At Bedtime       sertraline (ZOLOFT) 50 MG tablet Take 150 mg by mouth every morning       simvastatin (ZOCOR) 20 MG tablet Take 20 mg by mouth At Bedtime       tamsulosin (FLOMAX) 0.4 MG capsule Take 1 capsule by mouth daily       acetaminophen (TYLENOL) 325 MG tablet Take 2 tablets (650 mg) by mouth every 4 hours as needed for other (For optimal non-opioid multimodal pain management to improve pain control.) (Patient not taking: Reported on 2023) 30 tablet 0     dexamethasone (DECADRON) 2 MG tablet Take 3 tabs by mouth daily x 3 days, then 2 tabs daily x 3 days, then 1 tab daily x 3 days, then 1/2 tab daily x 3 days. (Patient not taking: Reported on 2023) 20 tablet 0     diphenhydrAMINE (BENADRYL) 25 MG capsule Take 1-2 capsules (25-50 mg) by mouth nightly as needed for  sleep (Patient not taking: Reported on 4/26/2023) 10 capsule 0     ondansetron (ZOFRAN ODT) 4 MG ODT tab Take 1 tablet (4 mg) by mouth every 6 hours as needed for nausea or vomiting (Patient not taking: Reported on 6/7/2023) 10 tablet 0     oxyCODONE (ROXICODONE) 5 MG tablet Take 1 tablet (5 mg) by mouth every 4 hours as needed for moderate pain (Patient not taking: Reported on 4/26/2023) 10 tablet 0     Pregabalin (LYRICA) 200 MG capsule 200 mg (Patient not taking: Reported on 6/7/2023)       vitamin B-12 (CYANOCOBALAMIN) 1000 MCG tablet Take 1,000 mcg by mouth daily (Patient not taking: Reported on 6/7/2023)          Allergies: has No Known Allergies.    Physical Exam:  The patient's  blood pressure is 116/67 and his pulse is 68. His respiration is 16 and oxygen saturation is 95%.      Incisions: clean, dry, intact     Neurological Examination:   Last medication dose: this morning   3/29/2023  1:00 PM 6/7/2023  12:00 PM   Tremor Motor Scale     Assessment Time 1:10 PM  12:24 PM    Medication On  On    DBS - Right Brain None  Off    DBS - Left Brain On  On    Head 0  0    Face & Jaw 0  0    Voice 0  0    Outstretched - RIGHT 0.5  0.5    Outstretched - LEFT 1  0.5    Wingbeating - RIGHT 1  0    Wingbeating - LEFT 1  0.5    Kinetic - RIGHT 1.5  0    Kinetic - LEFT 1.5  1    Lower Limb - RIGHT 0  0    Lower Limb - LEFT 0  0    Lower Limb (Max) 0  0    Spiral - RIGHT 1.5  0.5    Spiral - LEFT 2.5  2    Handwriting 4  1    Dot approx - RIGHT 1.5  1.5    Dot approx - LEFT 1  1    Trunk (Standing) 0  0    Total Right 6  2.5    Total Left 7  5    Axial 0  0    TOTAL 17  8.5            Procedure: DBS Interrogation & Programming  Lead(s):    Left Right   DBS Target VIM VIM   DBS Lead Type     Lead Implant Date 9/26/2022 4/11/2023      IPG(s):    1   IPG Genus R16   IPG Implant Date 10/4/2022   Location Left chest   Battery (V) /     Impedance Check: No problems found.  See scanned report for impedance details.    Program 1  Left Brain         Right brain      Initial Final Initial Final     Active Active None Active   Amplitude (mA) 3.0 3.0 [0-3.0]  0.1 [0-4.9]   Pulse width (usec) 60 60  60   Freq (Hz) 130 130  130   Contacts: C+L3_5, 7- C+L3_5,7-  C+L4-      Program 2 Left Brain         Right brain     Initial Final Initial Final     None Inactive None Inactive   Amplitude /mA)  2.5 [0-2.5]  0.1 [0-4.9]   Pulse width (usec)  60  60   Freq (Hz)  130  130   Contacts:  C+L3_6,7-  C+L4-     Program 3 Left Brain         Right brain      Initial Final Initial Final     None Inactive None Inactive   Amplitude /mA)  3.5 [0-3.5]  0.1 [0-4.9]   Pulse width (usec)  60  60   Freq (Hz)  130  130   Contacts:  C+L3_5-  C+L4-          R VIM:  Contacts Amplitude PW Frequency Effect SE   C+L1- 1.0 60 130 Spiral 1 NN    2.0   Spiral 1.5 NN    2.5   dysarthria Arm and face heavy    3.0   dysarthria Arm and face heavy    2.0   Resolved Resolved   C+L2- 1.0 60 130 Spiral 1 NN    2.0   Spiral 2 NN    3.0   dysarthria Face and hand heavy    2.8   Resolved resolved   C+L3- 1.0 60 130 Spiral 1 NN    2.0   Slight improvement NN    3.0   Spiral 1.5 NN    3.5   Dysarthria Face and arm heavy    3.4   Resolved Resolved   C+L4- 1.0 60 130 Spiral 1.5 NN    2.0   NC NN    3.0   Tighter spiral NN    4.0   Spiral 0.5 NN    5.0    Face heavy    4.9   Spiral 2 Resolved         Labs: 5/2/2023  Cr 1.5  GFR 48    Imaging:  [facility] 6/7/2023 - personally reviewed: perilead edema reduced compared to prior        Assessment/Plan:  Arley Butt is a 72 year old male with ET s/p bilateral VIM DBS who returns for RVIM initial programming.  He had significant improvement in his right hand tremor after directional programming last visit.  No changes to LVIM today.  Unfortunately, he developed vickey-lead edema and a small SDH after RVIM lead implantation.  This has impacted his cognition.  The edema is improving but cognitive symptoms are stable.  Underwent initial RVIM programming  today; however left hand tremor was minimal so no significant current was left on.  If his tremor worsens, he can increase the stimulation.    - Gabapentin wean due to reduced GFR  - No need to start pregabalin for tremor control  - RTC 1 months, 1 hr DBS programming          Kaylynn Plummer MD   of Neurology  Movement Disorders Division     Additional time spent for separate DBS programmin min DBS analyzed with reprogramming.

## 2023-06-06 NOTE — TELEPHONE ENCOUNTER
Went to urgent care yesterday because the swelling came on quickly. Last time he had swelling he had blood clots. US ruled out blood clots. He still has significant swelling but it has decreased. He has damaged veins in his legs causing it to be slow due to previous blood clots. Walking is much better today, no pain. He can come to tomorrows appointment and would have no issues walking. We will plan to keep tomorrows appointment. She asks if he should come off medications. I will double check this with Dr. Plummer.

## 2023-06-07 ENCOUNTER — ANCILLARY PROCEDURE (OUTPATIENT)
Dept: CT IMAGING | Facility: CLINIC | Age: 73
End: 2023-06-07
Attending: STUDENT IN AN ORGANIZED HEALTH CARE EDUCATION/TRAINING PROGRAM
Payer: COMMERCIAL

## 2023-06-07 ENCOUNTER — OFFICE VISIT (OUTPATIENT)
Dept: NEUROLOGY | Facility: CLINIC | Age: 73
End: 2023-06-07
Payer: COMMERCIAL

## 2023-06-07 VITALS
RESPIRATION RATE: 16 BRPM | OXYGEN SATURATION: 95 % | HEART RATE: 68 BPM | SYSTOLIC BLOOD PRESSURE: 116 MMHG | DIASTOLIC BLOOD PRESSURE: 67 MMHG

## 2023-06-07 DIAGNOSIS — Z96.89 S/P DEEP BRAIN STIMULATOR PLACEMENT: ICD-10-CM

## 2023-06-07 DIAGNOSIS — G25.0 ESSENTIAL TREMOR: ICD-10-CM

## 2023-06-07 DIAGNOSIS — G93.6 BRAIN EDEMA (H): ICD-10-CM

## 2023-06-07 DIAGNOSIS — G25.0 ESSENTIAL TREMOR: Primary | ICD-10-CM

## 2023-06-07 DIAGNOSIS — R41.89 COGNITIVE CHANGES: ICD-10-CM

## 2023-06-07 PROCEDURE — 70450 CT HEAD/BRAIN W/O DYE: CPT | Performed by: RADIOLOGY

## 2023-06-07 PROCEDURE — 95984 ALYS BRN NPGT PRGRMG ADDL 15: CPT | Performed by: STUDENT IN AN ORGANIZED HEALTH CARE EDUCATION/TRAINING PROGRAM

## 2023-06-07 PROCEDURE — 95983 ALYS BRN NPGT PRGRMG 15 MIN: CPT | Performed by: STUDENT IN AN ORGANIZED HEALTH CARE EDUCATION/TRAINING PROGRAM

## 2023-06-07 PROCEDURE — 99214 OFFICE O/P EST MOD 30 MIN: CPT | Mod: 25 | Performed by: STUDENT IN AN ORGANIZED HEALTH CARE EDUCATION/TRAINING PROGRAM

## 2023-06-07 RX ORDER — PREGABALIN 200 MG/1
200 CAPSULE ORAL
COMMUNITY
Start: 2023-05-02

## 2023-06-07 ASSESSMENT — ACTIVITIES OF DAILY LIVING (ADL)
DRINKING_FROM_A_GLASS: (0) NORMAL
OVERALL_DISABILITY_WITH_THE_MOST_AFFECTED_TASK: WRITING
WRITING: (1) SLIGHTLY ABNORMAL. TREMOR PRESENT BUT DOES NOT INTERFERE WITH WRITING.
USING_KEYS: (0) NORMAL
POURING: (1) SLIGHTLY ABNORMAL. TREMOR IS PRESENT BUT DOES NOT INTERFERE WITH POURING.
TOTAL_SCORE: 3
HYGIENE: (0) NORMAL
CARRYING_FOOD_TRAYS_PLATES_OR_SIMILAR_ITEMS: (0) NORMAL
WORKING: (0) NORMAL
DRESS: (0) NORMAL
SPEAKING: (0) NORMAL
SOCIAL_IMPACT: (0) NORMAL
OVERALL_DISABILITY_WITH_THE_MOST_AFFECTED_TASK: (1) SLIGHTLY ABNORMAL. TREMOR PRESENT BUT DOES NOT AFFECT TASK.
FEEDING_WITH_A_SPOON: (0) NORMAL

## 2023-06-07 ASSESSMENT — PAIN SCALES - GENERAL: PAINLEVEL: MILD PAIN (2)

## 2023-06-07 NOTE — PATIENT INSTRUCTIONS
Today we programmed your left body but there wasn't much tremor so it's has minimal settings:  You are on Program 1.  Right body is at 3.0 (range 0-3.0) and your left body is at 0.1 (range 0-4.9).  If you have more left hand tremor, you can turn the left body up by 0.2 (2 clicks) each day until it improves.  Let me know if you have more right hand tremor and I'll tell you the next steps.  Programs 2 and 3 are backups.    ------------------------------------------------------------------------------------------------------------------------------------------------  Let's decrease your gabapentin:  Week 1:             Gabapentin 300mg 1 1 1 3     Week 2:             Gabapentin 300mg 1 1 0 3     Week 3:             Gabapentin 300mg 1 0 0 3     Week: 4             Gabapentin 300mg 0 0 0 3     No need to start pregabalin.

## 2023-06-07 NOTE — LETTER
2023       RE: Arley Butt  [address] [place] [id number]     Dear Colleague,    Thank you for referring your patient, Arley Butt, to the [facility] NEUROLOGY [facility]INIC [place] at [facility]. Please see a copy of my visit note below.    Department of Neurology  Movement Disorders Division   DBS Follow-up Note    Patient: Arley Butt  MRN: [id number]   : 1950   Date of Visit: 2023    Diagnosis: Essential tremor  DBS Target(s): Bilateral VIM  Date(s) of DBS Lead Placement: L 2022, R 2023  Date(s) of IPG Placement: L chest 10/4/2022  Device: [employer]ad      Chief Complaint:  Arley Butt is a 72 year old male who returns to clinic for follow up of ET status post bilateral VIM DBS.  He was last seen 3/29/2023 at which time LVIM directional programming was performed.    Interval History:  Having LE edema. Thought to be related to kidney dysfunction and venous insufficiency.  [facility] gabapentin to pregabalin based upon GFR of 48 on blood work.  His daughter thinks that he takes this for tremor and nightmares.    Since the last DBS programming, his writing has improved.  With the second surgery, there has been a decline in his cognition.  CTH 3 weeks post op showed vickey-lead edema and small SDH.  He was given dexamethasone.  Symptoms are stable.    There has been improvement in his left hand tremor since surgery.    His voice is also more gravelly.       Tremor ADL Scale  1. Speaking 0 (0) Normal   2. Feeding with a spoon 0 (0) Normal   3. Drinking from a glass 0 (0) Normal   4. Hygiene 0 (0) Normal   5. Dressing 0 (0) Normal   6. Pouring 1 (1) Slightly abnormal. Tremor is present but does not interfere with pouring.   7. Carrying food trays, plates or similar items 0 (0) Normal   8. Using keys 0 (0) Normal   9. Writing 1 (1) Slightly abnormal. Tremor present but does not interfere with writing.    10. Working 0 (0) Normal   11. Overall disability with the most affected task 1 (1) Slightly abnormal. Tremor present but does not affect task.   Name of most affected task Writing Writing   12. Social impact 0 (0) Normal   ADL TOTAL 3    Prior: 25 on 3/29/2023      Related Medications           Gabapentin 300mg 2 1 2 3   Last taken today      Medications:  Current Outpatient Medications   Medication Sig Dispense Refill    Carboxymethylcellulose Sodium 1 % GEL Apply 1 drop to eye 4 times daily      cholecalciferol 50 MCG (2000 UT) tablet TAKE ONE TABLET BY MOUTH DAILY FOR VITAMIN D SUPPLEMENT      cyproheptadine (PERIACTIN) 4 MG tablet Take 12 mg by mouth At Bedtime      diphenhydrAMINE-acetaminophen (TYLENOL PM)  MG tablet Take 1-2 tablets by mouth nightly as needed for sleep      finasteride (PROSCAR) 5 MG tablet Take 1 tablet by mouth daily      folic acid (FOLVITE) 1 MG tablet Take 1 tablet by mouth daily      gabapentin (NEURONTIN) 300 MG capsule Dosing as follows:    AM:  300 mg   Afternoon: 300 mg   Evenin mg (300 mg capsule x2)  Bedtime: 900 mg (300 mg capsule x3)      mirtazapine (REMERON) 30 MG tablet Take 15 mg by mouth At Bedtime      NONFORMULARY Nocturnal CPAP with 2L oxygen      pantoprazole (PROTONIX) 40 MG EC tablet Take 40 mg by mouth 2 times daily      polyethylene glycol (MIRALAX) 17 g packet Take 17 g by mouth daily 30 packet 0    prazosin (MINIPRESS) 5 MG capsule Take 10 mg by mouth At Bedtime      sertraline (ZOLOFT) 50 MG tablet Take 150 mg by mouth every morning      simvastatin (ZOCOR) 20 MG tablet Take 20 mg by mouth At Bedtime      tamsulosin (FLOMAX) 0.4 MG capsule Take 1 capsule by mouth daily      acetaminophen (TYLENOL) 325 MG tablet Take 2 tablets (650 mg) by mouth every 4 hours as needed for other (For optimal non-opioid multimodal pain management to improve pain control.) (Patient not taking: Reported on 2023) 30 tablet 0    dexamethasone (DECADRON) 2 MG tablet  Take 3 tabs by mouth daily x 3 days, then 2 tabs daily x 3 days, then 1 tab daily x 3 days, then 1/2 tab daily x 3 days. (Patient not taking: Reported on 6/7/2023) 20 tablet 0    diphenhydrAMINE (BENADRYL) 25 MG capsule Take 1-2 capsules (25-50 mg) by mouth nightly as needed for sleep (Patient not taking: Reported on 4/26/2023) 10 capsule 0    ondansetron (ZOFRAN ODT) 4 MG ODT tab Take 1 tablet (4 mg) by mouth every 6 hours as needed for nausea or vomiting (Patient not taking: Reported on 6/7/2023) 10 tablet 0    oxyCODONE (ROXICODONE) 5 MG tablet Take 1 tablet (5 mg) by mouth every 4 hours as needed for moderate pain (Patient not taking: Reported on 4/26/2023) 10 tablet 0    Pregabalin (LYRICA) 200 MG capsule 200 mg (Patient not taking: Reported on 6/7/2023)      vitamin B-12 (CYANOCOBALAMIN) 1000 MCG tablet Take 1,000 mcg by mouth daily (Patient not taking: Reported on 6/7/2023)          Allergies: has No Known Allergies.    Physical Exam:  The patient's  blood pressure is 116/67 and his pulse is 68. His respiration is 16 and oxygen saturation is 95%.      Incisions: clean, dry, intact     Neurological Examination:   Last medication dose: this morning   3/29/2023  1:00 PM 6/7/2023  12:00 PM   Tremor Motor Scale     Assessment Time 1:10 PM  12:24 PM    Medication On  On    DBS - Right Brain None  Off    DBS - Left Brain On  On    Head 0  0    Face & Jaw 0  0    Voice 0  0    Outstretched - RIGHT 0.5  0.5    Outstretched - LEFT 1  0.5    Wingbeating - RIGHT 1  0    Wingbeating - LEFT 1  0.5    Kinetic - RIGHT 1.5  0    Kinetic - LEFT 1.5  1    Lower Limb - RIGHT 0  0    Lower Limb - LEFT 0  0    Lower Limb (Max) 0  0    Spiral - RIGHT 1.5  0.5    Spiral - LEFT 2.5  2    Handwriting 4  1    Dot approx - RIGHT 1.5  1.5    Dot approx - LEFT 1  1    Trunk (Standing) 0  0    Total Right 6  2.5    Total Left 7  5    Axial 0  0    TOTAL 17  8.5            Procedure: DBS Interrogation & Programming  Lead(s):    Left Right    DBS Target VIM VIM   DBS Lead Type     Lead Implant Date 9/26/2022 4/11/2023      IPG(s):    1   IPG Genus R16   IPG Implant Date 10/4/2022   Location Left chest   Battery (V) /     Impedance Check: No problems found.  See scanned report for impedance details.    Program 1 Left Brain         Right brain      Initial Final Initial Final     Active Active None Active   Amplitude (mA) 3.0 3.0 [0-3.0]  0.1 [0-4.9]   Pulse width (usec) 60 60  60   Freq (Hz) 130 130  130   Contacts: C+L3_5, 7- C+L3_5,7-  C+L4-      Program 2 Left Brain         Right brain     Initial Final Initial Final     None Inactive None Inactive   Amplitude /mA)  2.5 [0-2.5]  0.1 [0-4.9]   Pulse width (usec)  60  60   Freq (Hz)  130  130   Contacts:  C+L3_6,7-  C+L4-     Program 3 Left Brain         Right brain      Initial Final Initial Final     None Inactive None Inactive   Amplitude /mA)  3.5 [0-3.5]  0.1 [0-4.9]   Pulse width (usec)  60  60   Freq (Hz)  130  130   Contacts:  C+L3_5-  C+L4-          R VIM:  Contacts Amplitude PW Frequency Effect SE   C+L1- 1.0 60 130 Spiral 1 NN    2.0   Spiral 1.5 NN    2.5   dysarthria Arm and face heavy    3.0   dysarthria Arm and face heavy    2.0   Resolved Resolved   C+L2- 1.0 60 130 Spiral 1 NN    2.0   Spiral 2 NN    3.0   dysarthria Face and hand heavy    2.8   Resolved resolved   C+L3- 1.0 60 130 Spiral 1 NN    2.0   Slight improvement NN    3.0   Spiral 1.5 NN    3.5   Dysarthria Face and arm heavy    3.4   Resolved Resolved   C+L4- 1.0 60 130 Spiral 1.5 NN    2.0   NC NN    3.0   Tighter spiral NN    4.0   Spiral 0.5 NN    5.0    Face heavy    4.9   Spiral 2 Resolved         Labs: 5/2/2023  Cr 1.5  GFR 48    Imaging:  [facility] 6/7/2023 - personally reviewed: perilead edema reduced compared to prior        Assessment/Plan:  Arley BUSTAMANTE Daquan is a 72 year old male with ET s/p bilateral VIM DBS who returns for RVIM initial programming.  He had significant improvement in his right hand tremor after directional  programming last visit.  No changes to LVIM today.  Unfortunately, he developed vickey-lead edema and a small SDH after RVIM lead implantation.  This has impacted his cognition.  The edema is improving but cognitive symptoms are stable.  Underwent initial RVIM programming today; however left hand tremor was minimal so no significant current was left on.  If his tremor worsens, he can increase the stimulation.    - Gabapentin wean due to reduced GFR  - No need to start pregabalin for tremor control  - RTC 1 months, 1 hr DBS programming          Kaylynn Plummer MD   of Neurology  Movement Disorders Division     Additional time spent for separate DBS programmin min DBS analyzed with reprogramming.

## 2023-06-09 ENCOUNTER — TELEPHONE (OUTPATIENT)
Dept: NEUROSURGERY | Facility: CLINIC | Age: 73
End: 2023-06-09

## 2023-06-09 NOTE — TELEPHONE ENCOUNTER
Spoke with pt's daughter Svitlana. She said that pt continues to have memory issues and issues related to judgement following Right VIM DBS lead placment on 4/11/23. Pt is scheduled for follow-up appt with Dr. Otero on 6/13/23.     Svitlana said that a big concern is that she and the family do not think that pt has the judgment to drive but pt does. She would like driving restrictions to remain in place and for Dr. Otero to emphasize this to pt, as it will have more impact coming from him than family.     Daughter gave several examples highlighting pt's current status: A family member took pt for a walk and handed him his cane to use and he did not know how to use it. He had to ask what he should do. He took a shower with his hearing aids in. He started gardening and weeding his neighbor's yard assuming that it would be fine with them even though he didn't have their permission and doesn't know them well.    I will update Dr. Otero on the above ahead of the 6/13 appointment. Nothing further at this time.

## 2023-06-13 ENCOUNTER — OFFICE VISIT (OUTPATIENT)
Dept: NEUROSURGERY | Facility: CLINIC | Age: 73
End: 2023-06-13
Payer: COMMERCIAL

## 2023-06-13 VITALS
SYSTOLIC BLOOD PRESSURE: 127 MMHG | HEART RATE: 63 BPM | WEIGHT: 208 LBS | OXYGEN SATURATION: 97 % | DIASTOLIC BLOOD PRESSURE: 69 MMHG | BODY MASS INDEX: 34.2 KG/M2

## 2023-06-13 DIAGNOSIS — G25.0 ESSENTIAL TREMOR: Primary | ICD-10-CM

## 2023-06-13 PROCEDURE — 99024 POSTOP FOLLOW-UP VISIT: CPT | Performed by: NEUROLOGICAL SURGERY

## 2023-06-13 NOTE — LETTER
6/13/2023       RE: Arley Butt  [address] [facility] [address]     Dear Colleague,    Thank you for referring your patient, Arley Butt, to the [facility] NEUROS[facility]GERY CLINIC [place] at [facility]. Please see a copy of my visit note below.    Neurosurgery Attending DBS Follow-Up Note     HPI: Arley Butt is a 71 y/o gentleman s/p bilateral ViM DBS (BSci Genus R16 with two extensions) with his second side surgery about two months ago who presents for f/u. He has had a longer recovery since his second side surgery with more cognitive symptoms after the more recent surgery. He had a significant amount of edema around the lead on this side which is likely impacting his cognition. Steroids may have helped a bit, but they did not notice a huge difference. Tremor is well controlled bilaterally, but he was confused for a while and still has had some behavior that his daughter is worried about (being handed a cane, not knowing how to use it). They asked about gabapentin and whether that could be contributing to it. [facility] seems that [facility] for PTSD type symptoms and sleep disorder for which apparently this helps.        Exam:  Awake, alert, oriented x 3  Attends, follows, fluent  PERRL  EOMI  FS  TML  BUE/BLE 5/5, no drift  Wounds all c/d/i, no erythema, swelling or tenderness     Programming schedule: next week with Dr. Plummer     UPDRS/TETRAS outcome: TBD     A/P: 71 y/o gentleman with ET s/p bilateral ViM DBS, slowly recovering but doing well. His symptoms will likely continue to improve as the edema around the lead subsides.     - Would hold off on driving until his cognition improves along with the lead edema  - RTC in one month with new CT  - Programming per movement disorders team  - No other physical activity restrictions    Sincerely,    Manuel Otero MD

## 2023-06-21 NOTE — PROGRESS NOTES
Neurosurgery Attending DBS Follow-Up Note     HPI: Arley Butt is a 71 y/o gentleman s/p bilateral ViM DBS (BSci Genus R16 with two extensions) with his second side surgery about two months ago who presents for f/u. He has had a longer recovery since his second side surgery with more cognitive symptoms after the more recent surgery. He had a significant amount of edema around the lead on this side which is likely impacting his cognition. Steroids may have helped a bit, but they did not notice a huge difference. Tremor is well controlled bilaterally, but he was confused for a while and still has had some behavior that his daughter is worried about (being handed a cane, not knowing how to use it). They asked about gabapentin and whether that could be contributing to it. It seems that the [facility] wants to [facility] this for PTSD type symptoms and sleep disorder for which apparently this helps.        Exam:  Awake, alert, oriented x 3  Attends, follows, fluent  PERRL  EOMI  FS  TML  BUE/BLE 5/5, no drift  Wounds all c/d/i, no erythema, swelling or tenderness     Programming schedule: next week with Dr. Plummer     UPDRS/TETRAS outcome: TBD     A/P: 71 y/o gentleman with ET s/p bilateral ViM DBS, slowly recovering but doing well. His symptoms will likely continue to improve as the edema around the lead subsides.     - Would hold off on driving until his cognition improves along with the lead edema  - RTC in one month with new CT  - Programming per movement disorders team  - No other physical activity restrictions

## 2023-06-30 DIAGNOSIS — Z96.89 S/P DEEP BRAIN STIMULATOR PLACEMENT: Primary | ICD-10-CM

## 2023-07-11 ENCOUNTER — OFFICE VISIT (OUTPATIENT)
Dept: NEUROLOGY | Facility: CLINIC | Age: 73
End: 2023-07-11
Payer: COMMERCIAL

## 2023-07-11 VITALS
SYSTOLIC BLOOD PRESSURE: 118 MMHG | HEART RATE: 69 BPM | DIASTOLIC BLOOD PRESSURE: 74 MMHG | OXYGEN SATURATION: 95 % | RESPIRATION RATE: 16 BRPM

## 2023-07-11 DIAGNOSIS — R41.89 COGNITIVE CHANGES: ICD-10-CM

## 2023-07-11 DIAGNOSIS — Z96.89 S/P DEEP BRAIN STIMULATOR PLACEMENT: ICD-10-CM

## 2023-07-11 DIAGNOSIS — G25.0 ESSENTIAL TREMOR: Primary | ICD-10-CM

## 2023-07-11 PROCEDURE — 95983 ALYS BRN NPGT PRGRMG 15 MIN: CPT | Performed by: STUDENT IN AN ORGANIZED HEALTH CARE EDUCATION/TRAINING PROGRAM

## 2023-07-11 PROCEDURE — 99214 OFFICE O/P EST MOD 30 MIN: CPT | Mod: 25 | Performed by: STUDENT IN AN ORGANIZED HEALTH CARE EDUCATION/TRAINING PROGRAM

## 2023-07-11 ASSESSMENT — ACTIVITIES OF DAILY LIVING (ADL)
FEEDING_WITH_A_SPOON: (0) NORMAL
USING_KEYS: (0) NORMAL
HYGIENE: (0) NORMAL
DRINKING_FROM_A_GLASS: (0) NORMAL
SOCIAL_IMPACT: (0) NORMAL
OVERALL_DISABILITY_WITH_THE_MOST_AFFECTED_TASK: (0) NORMAL
POURING: (0) NORMAL
CARRYING_FOOD_TRAYS_PLATES_OR_SIMILAR_ITEMS: (0) NORMAL
OVERALL_DISABILITY_WITH_THE_MOST_AFFECTED_TASK: WRITING
DRESS: (0) NORMAL
WRITING: (1) SLIGHTLY ABNORMAL. TREMOR PRESENT BUT DOES NOT INTERFERE WITH WRITING.
WORKING: (0) NORMAL
SPEAKING: (0) NORMAL
TOTAL_SCORE: 1

## 2023-07-11 ASSESSMENT — PAIN SCALES - GENERAL: PAINLEVEL: NO PAIN (0)

## 2023-07-11 NOTE — PATIENT INSTRUCTIONS
No changes to your DBS today.  You are still on Program 1.  Right body is at 3.0 and left body is at 0.1.  If you notice more tremor on the left, you can increase by 0.2 every day.  If you notice more tremor on the right, let me know.

## 2023-07-11 NOTE — LETTER
2023       RE: Arley Butt  [address] [place] [id number]       Dear Colleague,    Thank you for referring your patient, Arley Butt, to the [facility] NEUROLOGY CLIN[facility] [place] at [facility]. Please see a copy of my visit note below.    Department of Neurology  Movement Disorders Division   DBS Follow-up Note    Patient: Arley Butt  MRN: [id number]   : 1950   Date of Visit: 2023    Diagnosis: Essential tremor  DBS Target(s): Bilateral VIM  Date(s) of DBS Lead Placement: L 2022, R 2023  Date(s) of IPG Placement: L chest 10/4/2022  Device: [employer]      Chief Complaint:  Arley Butt is a 72 year old male who returns to clinic for follow up of ET status post bilateral VIM DBS.  He was last seen 2023 at which time initial R VIM programming was performed and gabapentin was weaned due to reduced GFR.    Interval History:  Cognition and balance are stable.  Both hands are doing well with regards to tremor.  His handwriting and spirals have been good.    Still working on his gabapentin wean.  He did make an error in managing the medications himself.  Sleeping well. No nightmares.  He is having a little back pain and family feels he is more quiet.  This is a recent change.       Tremor ADL Scale  1. Speaking 0 (0) Normal   2. Feeding with a spoon 0 (0) Normal   3. Drinking from a glass 0 (0) Normal   4. Hygiene 0 (0) Normal   5. Dressing 0 (0) Normal   6. Pouring 0 (0) Normal   7. Carrying food trays, plates or similar items 0 (0) Normal   8. Using keys 0 (0) Normal   9. Writing 1 (1) Slightly abnormal. Tremor present but does not interfere with writing.   10. Working 0 (0) Normal   11. Overall disability with the most affected task 0 (0) Normal   Name of most affected task Writing Writing   12. Social impact 0 (0) Normal   ADL TOTAL 1    Prior: 3 on 2023        Relevant Medications Bedtime   Gabapentin  300mg 3       Medications:  Current Outpatient Medications   Medication Sig Dispense Refill    Carboxymethylcellulose Sodium 1 % GEL Apply 1 drop to eye 4 times daily      cholecalciferol 50 MCG (2000 UT) tablet TAKE ONE TABLET BY MOUTH DAILY FOR VITAMIN D SUPPLEMENT      cyproheptadine (PERIACTIN) 4 MG tablet Take 12 mg by mouth At Bedtime      diphenhydrAMINE-acetaminophen (TYLENOL PM)  MG tablet Take 1-2 tablets by mouth nightly as needed for sleep      finasteride (PROSCAR) 5 MG tablet Take 1 tablet by mouth daily      folic acid (FOLVITE) 1 MG tablet Take 1 tablet by mouth daily      gabapentin (NEURONTIN) 300 MG capsule Dosing as follows:    AM:  300 mg   Afternoon: 300 mg   Evenin mg (300 mg capsule x2)  Bedtime: 900 mg (300 mg capsule x3)      mirtazapine (REMERON) 30 MG tablet Take 15 mg by mouth At Bedtime      NONFORMULARY Nocturnal CPAP with 2L oxygen      pantoprazole (PROTONIX) 40 MG EC tablet Take 40 mg by mouth 2 times daily      polyethylene glycol (MIRALAX) 17 g packet Take 17 g by mouth daily 30 packet 0    prazosin (MINIPRESS) 5 MG capsule Take 10 mg by mouth At Bedtime      Pregabalin (LYRICA) 200 MG capsule 200 mg      sertraline (ZOLOFT) 50 MG tablet Take 150 mg by mouth every morning      simvastatin (ZOCOR) 20 MG tablet Take 20 mg by mouth At Bedtime      tamsulosin (FLOMAX) 0.4 MG capsule Take 1 capsule by mouth daily      vitamin B-12 (CYANOCOBALAMIN) 1000 MCG tablet Take 1,000 mcg by mouth daily      acetaminophen (TYLENOL) 325 MG tablet Take 2 tablets (650 mg) by mouth every 4 hours as needed for other (For optimal non-opioid multimodal pain management to improve pain control.) (Patient not taking: Reported on 2023) 30 tablet 0    dexamethasone (DECADRON) 2 MG tablet Take 3 tabs by mouth daily x 3 days, then 2 tabs daily x 3 days, then 1 tab daily x 3 days, then 1/2 tab daily x 3 days. (Patient not taking: Reported on 2023) 20 tablet 0    diphenhydrAMINE (BENADRYL)  25 MG capsule Take 1-2 capsules (25-50 mg) by mouth nightly as needed for sleep (Patient not taking: Reported on 4/26/2023) 10 capsule 0    ondansetron (ZOFRAN ODT) 4 MG ODT tab Take 1 tablet (4 mg) by mouth every 6 hours as needed for nausea or vomiting (Patient not taking: Reported on 6/7/2023) 10 tablet 0    oxyCODONE (ROXICODONE) 5 MG tablet Take 1 tablet (5 mg) by mouth every 4 hours as needed for moderate pain (Patient not taking: Reported on 4/26/2023) 10 tablet 0        Allergies: has No Known Allergies.    Physical Exam:  The patient's  blood pressure is 118/74 and his pulse is 69. His respiration is 16 and oxygen saturation is 95%.       Incisions: clean, dry, intact     Neurological Examination:   Last medication dose: last night   6/7/2023  12:00 PM 7/11/2023  11:00 AM   Tremor Motor Scale     Assessment Time 12:24  11:17    Medication On  Off    DBS - Right Brain Off  On    DBS - Left Brain On  On    Head 0  0    Face & Jaw 0  0    Voice 0  0    Outstretched - RIGHT 0.5  0.5    Outstretched - LEFT 0.5  1    Wingbeating - RIGHT 0  0    Wingbeating - LEFT 0.5  1    Kinetic - RIGHT 0  0.5    Kinetic - LEFT 1  1    Lower Limb - RIGHT 0  0    Lower Limb - LEFT 0  0    Lower Limb (Max) 0  0    Spiral - RIGHT 0.5  0.5    Spiral - LEFT 2  1    Handwriting 1  3    Dot approx - RIGHT 1.5  1    Dot approx - LEFT 1  0.5    Trunk (Standing) 0  0    Total Right 2.5  2.5    Total Left 5  4.5    Axial 0  0    TOTAL 8.5  10            Procedure: DBS Interrogation & Programming  Lead(s):    Left Right   DBS Target VIM VIM   DBS Lead Type       Lead Implant Date 9/26/2022 4/11/2023      IPG(s):    1   IPG Genus R16   IPG Implant Date 10/4/2022   Location Left chest   Battery (V) /     Impedance Check: No problems found.  See scanned report for impedance details.    Program 1 Left Brain         Right brain       Initial Final Initial Final     Active Active Active Active   Amplitude (mA) 3.0 3.0 [0-3.0] 0.1 [0-4.9] 0.1  [0-4.9]   Pulse width (usec) 60 60 60 60   Freq (Hz) 130 130 130 130   Contacts: C+L3_5, 7- C+L3_5,7- C+L4- C+L4-             Program 2 Left Brain         Right brain      Initial Final Initial Final     Inactive Inactive Inactive Inactive   Amplitude /mA) 2.5 [0-2.5] 2.5 [0-2.5] 0.1 [0-4.9] 0.1 [0-4.9]   Pulse width (usec) 60 60 60 60   Freq (Hz) 130 130 130 130   Contacts: C+L3_6,7- C+L3_6,7- C+L4- C+L4-      Program 3 Left Brain         Right brain       Initial Final Initial Final     Inactive Inactive Inactive Inactive   Amplitude /mA) 3.5 [0-3.5] 3.5 [0-3.5] 0.1 [0-4.9] 0.1 [0-4.9]   Pulse width (usec) 60 60 60 60   Freq (Hz) 130 130 130 130   Contacts: C+L3_5- C+L3_5- C+L4- C+L4-       RVIM:  Contacts Amplitude PW Frequency Effect SE   C+L4- 2.0 60 130 Spiral 0.5 NN    3.0   Spiral 1 NN    4.0   Spiral 2 NN    0.0   Spiral 1 NN    2.0   NC NN    2.0 60 149 Spiral 0.5 NN    2.0 60 170 Spiral 1 NN    3.0   Spiral 0.5 NN    4.0    Left facial tightness    2.0 80 130 Spiral 1 NN    3.0   Spiral 1 NN    4.0    Left facial tightness    3.0 80 149 Spiral 2 NN   C+L3_5- 1.0 60 130 Spiral 1 NN    2.0   NC NN    3.0   NC NN    4.0    Left side tightness    3.5    improving    3.0    resolved   C+L3_6- 3.0 60 130 Spiral 0.5 NN    3.5   Spiral 2 NN    4.0    Tightness   C+L3_7- 3.0 60 130 Spiral 1.5 NN    3.5    L tightness   C+L3_6-  C+L4- 2.0  2.0 60 130  104 Spiral 0.5 NN    2.0  3.0   Spiral 1 NN    2.0  4.0   Spiral 0.5 NN   C+L4-L3_6-  (50/50%) 3.5 60 130 Spiral 0.5 NN             Assessment/Plan:  Arley Butt is a 72 year old male with ET s/p bilateral VIM DBS who returns for follow-up.  He is satisfied with his present level of tremor.  I explored alternative settings of the R VIM including directional, interleaving, increased pulse width, and increased frequency but none provided tremor improvement.  Neuropsych testing scheduled to assess for post-surgical cognitive changes.    - Remains on prior settings,  program 1  - RTC 6 months, 1 hr DBS programming         31 minutes spent on the date of the encounter doing chart review, history and exam, documentation and further activities as noted above.     Additional time spent for separate DBS programmin min DBS analyzed with reprogramming.        Again, thank you for allowing me to participate in the care of your patient.      Sincerely,    Kaylynn Plummer MD

## 2023-07-25 ENCOUNTER — OFFICE VISIT (OUTPATIENT)
Dept: NEUROSURGERY | Facility: CLINIC | Age: 73
End: 2023-07-25
Payer: COMMERCIAL

## 2023-07-25 ENCOUNTER — ANCILLARY PROCEDURE (OUTPATIENT)
Dept: CT IMAGING | Facility: CLINIC | Age: 73
End: 2023-07-25
Attending: NEUROLOGICAL SURGERY
Payer: COMMERCIAL

## 2023-07-25 VITALS
BODY MASS INDEX: 34.53 KG/M2 | SYSTOLIC BLOOD PRESSURE: 111 MMHG | WEIGHT: 210 LBS | DIASTOLIC BLOOD PRESSURE: 80 MMHG | OXYGEN SATURATION: 96 % | RESPIRATION RATE: 16 BRPM | HEART RATE: 67 BPM

## 2023-07-25 DIAGNOSIS — Z96.89 S/P DEEP BRAIN STIMULATOR PLACEMENT: ICD-10-CM

## 2023-07-25 DIAGNOSIS — R41.89 COGNITIVE CHANGE: Primary | ICD-10-CM

## 2023-07-25 DIAGNOSIS — G25.0 ESSENTIAL TREMOR: Primary | ICD-10-CM

## 2023-07-25 PROCEDURE — 70450 CT HEAD/BRAIN W/O DYE: CPT | Mod: GC | Performed by: RADIOLOGY

## 2023-07-25 PROCEDURE — 99212 OFFICE O/P EST SF 10 MIN: CPT | Performed by: NEUROLOGICAL SURGERY

## 2023-07-25 NOTE — LETTER
"  7/25/2023       RE: Arley Butt  [address] [facility] [address]       Dear Colleague,    Thank you for referring your patient, Arley Butt, to the [facility] NEUROSURGERY CLINIC [place] at [facility]. Please see a copy of my visit note below.    Neurosurgery Attending DBS Follow-Up Note     HPI: Arley Butt is a 71 y/o gentleman s/p bilateral ViM DBS (BSci Genus R16 with two extensions) with his second side surgery about three months ago who presents for f/u. He has had a longer recovery since his second side surgery with more cognitive symptoms after the more recent surgery. He had a significant amount of edema around the lead on this side which is likely impacting his cognition. Steroids may have helped a bit, but they did not notice a huge difference. Tremor is well controlled bilaterally, but he was confused for a while and still has had some behavior that his daughter is worried about (being handed a cane, not knowing how to use it). His daughter has not noticed much improvement since his last visit a month ago. He has a CT from this morning which shows significantly improved edema. [facility] Daughter is interested in CBD or some other type of relaxing aid as she feels \"his brain is always turned on and moving 100 mph.\"     Exam:  Awake, alert, oriented x 3  Attends, follows, fluent  PERRL  EOMI  FS  TML  BUE/BLE 5/5, no drift  Wounds all c/d/i, no erythema, swelling or tenderness     Programming schedule: next week with Dr. Plummer     UPDRS/TETRAS outcome: TBD     A/P: 71 y/o gentleman with ET s/p bilateral ViM DBS, slowly recovering but doing well. Although the lead edema has substantially resolved, his symptoms have not really improved yet. There can be lag time between radiological improvement and subsequent clinical improvement. There is nothing on his prior MRI that indicates he " should have permanent sequelae from this. If he continues to be quite symptomatic, we can get a follow up MRI and plan for further OT sessions to identify strategies to work around his difficulty with organization, planning, etc. I will defer the use of CBD or other medication to his PCP, but I see no issues in terms of it interacting with his DBS.     - RTC in 3 months with new CT  - Programming per movement disorders team  - No other physical activity restrictions          Again, thank you for allowing me to participate in the care of your patient.      Sincerely,    Manuel Otero MD

## 2023-07-26 NOTE — PROGRESS NOTES
"Neurosurgery Attending DBS Follow-Up Note     HPI: Arley Butt is a 73 y/o gentleman s/p bilateral ViM DBS (BSci Genus R16 with two extensions) with his second side surgery about three months ago who presents for f/u. He has had a longer recovery since his second side surgery with more cognitive symptoms after the more recent surgery. He had a significant amount of edema around the lead on this side which is likely impacting his cognition. Steroids may have helped a bit, but they did not notice a huge difference. Tremor is well controlled bilaterally, but he was confused for a while and still has had some behavior that his daughter is worried about (being handed a cane, not knowing how to use it). His daughter has not noticed much improvement since his last visit a month ago. He has a CT from this morning which shows significantly improved edema. [facility]. Daughter is interested in CBD or some other type of relaxing aid as she feels \"his brain is always turned on and moving 100 mph.\"     Exam:  Awake, alert, oriented x 3  Attends, follows, fluent  PERRL  EOMI  FS  TML  BUE/BLE 5/5, no drift  Wounds all c/d/i, no erythema, swelling or tenderness     Programming schedule: next week with Dr. Plummer     UPDRS/TETRAS outcome: TBD     A/P: 73 y/o gentleman with ET s/p bilateral ViM DBS, slowly recovering but doing well. Although the lead edema has substantially resolved, his symptoms have not really improved yet. There can be lag time between radiological improvement and subsequent clinical improvement. There is nothing on his prior MRI that indicates he should have permanent sequelae from this. If he continues to be quite symptomatic, we can get a follow up MRI and plan for further OT sessions to identify strategies to work around his difficulty with organization, planning, etc. I will defer the use of CBD or other medication to his PCP, but I see no issues in terms " of it interacting with his DBS.     - RTC in 3 months with new CT  - Programming per movement disorders team  - No other physical activity restrictions

## 2023-08-16 ENCOUNTER — TELEPHONE (OUTPATIENT)
Dept: NEUROPSYCHOLOGY | Facility: CLINIC | Age: 73
End: 2023-08-16

## 2023-08-16 NOTE — TELEPHONE ENCOUNTER
[facility] Call Center    Phone Message    May a detailed message be left on voicemail: yes     Reason for Call: Other: Whitley is calling from imaging scheduling stating that the patient is needing to coordinate an imaging appointment with the neuropsych appointment. They are unable to schedule imaging until 8/28 and are wondering if Dr. Cabrera has any availability then. Please call the patients daughter Svitlana back to assist in coordinating with imaging.      Action Taken: Message routed to:  [facility] ([facility]    Travel Screening: Not Applicable

## 2023-08-18 ENCOUNTER — OFFICE VISIT (OUTPATIENT)
Dept: NEUROPSYCHOLOGY | Facility: CLINIC | Age: 73
End: 2023-08-18
Attending: STUDENT IN AN ORGANIZED HEALTH CARE EDUCATION/TRAINING PROGRAM
Payer: COMMERCIAL

## 2023-08-18 DIAGNOSIS — G25.0 ESSENTIAL TREMOR: Primary | ICD-10-CM

## 2023-08-18 DIAGNOSIS — F09 MENTAL DISORDER DUE TO GENERAL MEDICAL CONDITION: ICD-10-CM

## 2023-08-18 PROCEDURE — 96133 NRPSYC TST EVAL PHYS/QHP EA: CPT

## 2023-08-18 PROCEDURE — 90791 PSYCH DIAGNOSTIC EVALUATION: CPT

## 2023-08-18 PROCEDURE — 96132 NRPSYC TST EVAL PHYS/QHP 1ST: CPT

## 2023-08-18 PROCEDURE — 96138 PSYCL/NRPSYC TECH 1ST: CPT

## 2023-08-18 PROCEDURE — 96139 PSYCL/NRPSYC TST TECH EA: CPT

## 2023-08-18 NOTE — PROGRESS NOTES
The patient was seen for neuropsychological evaluation at the request of Kaylynn Plummer MD for the purposes of diagnostic clarification and treatment planning. 274 minutes of test administration and scoring were provided by this writer. Please see Dr. Skylar Cabrera's report for a full interpretation of the findings.    Daisha Baer  Psychometrist

## 2023-08-18 NOTE — PROGRESS NOTES
NEUROPSYCHOLOGICAL EVALUATION    RELEVANT HISTORY AND REASON FOR REFERRAL    Arley Butt is a 73 year old, right handed, retired  with 11 years of formal education. Information was obtained via interview with the patient and his daughter, and review of his medical records. Records indicate a history of essential tremor with onset about 6 years ago. He is s/p bilateral VIM DBS lead implantation in September 2022 and April 2023. 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 At a neurosurgery visit on July 25, 2023, it was noted that he had a longer recovery since his second side surgery with more cognitive symptoms after the recent surgery. He had a significant amount of edema around the lead on this side, thought likely to have affected cognition. Tremor was well controlled bilaterally, but the was confused for awhile, and as of July, still had some concerning behaviors, such as not knowing how to use a cane he was handed. While a CT scan a the time showed significantly improved edema, his daughter had not noticed much improvement in cognition over the previous month. He was referred for neuropsychological evaluation by Kaylynn Plummer M.D., for further characterization of any cognitive difficulties and to evaluate mood.     Records indicate that Mr. Butt [facility] [place], and two evaluations under my direction as well. The report from his 7/30/2021 visit under the direction of Vinay Beckham, Ph.D., has been scanned into Epic. The findings were noted to reflect mild neurocognitive disorder, with performance demonstrating a slight decline as compared to 2017 testing in learning and memory, language, attention and concentration, and processing speed. He had  deficits in processing speed, with variable scores ranging from average to below average, although these may have been affected by motor functioning. Scores on learning and memory measures ranged from below average to exceptionally low for verbal material with relatively better performance for visual information, falling in the low average range. He underwent a neuropsychological evaluation under my direction on 4/14/2022 prior to surgery. As noted, results indicated prominent impairments in verbal learning and memory,while memory for visual information fell within normal limits. He was distractible. Performance otherwise fell within normal limits across cognitive domains. Compared to his July 2021 evaluation, verbal memory was generally stable, memory for visual information was stable or slightly improved, and he had less variability within cognitive domains. Finally, he underwent an evaluation on 1/31/2023, prior to his second surgery. Results indicated impairments in learning of new information.  Memory was impaired, but he sometimes benefitted from cues, suggesting retrieval difficulties.  Fluency was slowed.  Drawings and written tasks were notable for significant tremor using his right hand.  Basic language, visual processing, and attention all fell within normal limits.  He endorsed minimal depressive symptomatology and apathy. These findings reflected improvements in verbal memory, particularly on recognition tasks.  Letter fluency had declined, while semantic fluency and switching fluency remained stable.  Performance otherwise remained relatively stable across cognitive domains.    CLINICAL INTERVIEW FINDINGS    Upon interview, Mr. Butt stated that his second surgery went well and he has had good benefit to his tremor.  He does not think that he has had any cognitive changes.  His daughter noted that in June and July in particular, he seemed to have difficulty processing information, and appeared  confused.  He did things like wear his hearing aids in his shower, which he had never done before, and he did activities that were restricted after surgery, seemingly having forgotten that they were restricted.  He did not seem to remember why he was not driving.  They had to put up notes to remind him to shave and shower.  All of this has improved over time, however.  He occasionally repeats questions but generally his memory and word finding seem better.  His attention and concentration are not as good as they used to be, but seem to be improving.  It takes him longer to make decisions as he seems to take longer to process information.    Mr. Butt lives with his wife, and they share the management of their finances.  He denied any difficulty in this regard.  He manages his own medications without difficulty.  He cooks with his wife and denied any difficulty in this regard.  He has not driven since April and hopes to return to driving.  He handles his personal cares independently.    When asked to describe his mood, Mr. Butt stated that it is better in the last month.  His daughter noted that he was withdrawn and frustrated with his restrictions.  This seems to be improving in the last month and a half.  He is no longer feeling hopeless or helpless.  While he stated that he is not feeling anxious, his daughter noted that since June he seems to get fixated on things and cannot let them go.  For instance, he seems fixated on his truck keys, and on ducks which he is feeding and watering.  He would not slow down to take care of himself. Unfortunately his wife injured her arm and he pushed himself to get better so that he could care for her, but he was not doing well.  This seems to be better in the last month and a half, however.  His interest level is improving and he is doing things like spending time tinkering in the garage.  He is sleeping well, eight hours a night, and he feels rested in the morning.  He  occasionally naps, but not often.  His appetite has not been particularly good.  He seems to crave sweets more and he has gained some weight.  His energy level is fair.  He denied visual or auditory hallucinations.  He denied suicidal ideation.    Mr. Butt denied alcohol or tobacco use.  He has tried CBD gummies to help with sleep and with calming his mind, but these were not particularly helpful.    Since his last evaluation, Mr. Butt has not had any major illnesses or injuries.  He never had COVID.  He described his balance as good and stated that he has not been falling, although his daughter noted that he has had a couple of falls.  On July 26 he went for a walk and was focusing on adjusting his yifan pack, and fell on a curb.  On another occasion he fell and was caught by a wall.  His daughter noted that his blood pressure had been low but it has recently gotten up, which seems to be helping with his balance.  He denied unilateral weakness or numbness.  He has not been bothered by headaches.  He has some back pain.    Past Medical History    Past Medical History:   Diagnosis Date    Alcohol dependence (H)     Anemia     Anxiety     BPH (benign prostatic hyperplasia)     Cataract     Chronic pain     DVT (deep venous thrombosis) (H)     Dysphagia     Dysthymia     Erosive gastritis     Esophagitis     Essential tremor     Follicular lymphoma (H)     Foot sprain     Gastroesophageal reflux disease with esophagitis     HTN (hypertension)     Hyperlipidemia     Impacted cerumen     MCI (mild cognitive impairment)     Osteoarthritis     Presbyopia     PTSD (post-traumatic stress disorder)     Sensorineural hearing loss, bilateral     Tinnitus, bilateral     Urinary frequency      Current Medications    He has a current medication list which includes the following prescription(s): acetaminophen, carboxymethylcellulose sodium, cholecalciferol, cyproheptadine, dexamethasone, diphenhydramine,  diphenhydramine-acetaminophen, finasteride, folic acid, gabapentin, mirtazapine, NONFORMULARY, ondansetron, oxycodone, pantoprazole, polyethylene glycol, prazosin, pregabalin, sertraline, simvastatin, tamsulosin, and vitamin b-12.    Family History    Significant for a father who may have had tremor.  He noted that both of his parents drank a lot of alcohol. Records note that his mother had a stroke.  He has no known family history of dementia     BEHAVIORAL OBSERVATIONS    During the evaluation, Mr. Butt was pleasant, cooperative, and seemed to understand the instructions. He was alert and oriented to person, place, month, and year, but not to date.He wore hearing aids. Tremor was observed clinically, primarily in his right hand. Drawing tasks were scored liberally for this reason. Gait was slow, and he used a cane for ambulation. Mood was euthymic. Speech was fluent, with normal articulation, volume, and rate. He presented his thoughts in a clear, logical manner. Internal performance validity measures fell within normal limits. The results are believed to accurately reflect his current level of functioning.       MEASURES ADMINISTERED    The following measures were administered by a trained psychometrist, under the direct supervision of a licensed psychologist.    Subtests of the Wechsler Adult Intelligence Scale-4; subtests of the Wechsler Memory Scale-4; Lozano Verbal Learning Test-Revised, Form 4; Brief Visual Memory Test - Revised, Form 1; Easton Naming Test; D-KEFS Verbal Fluency, Standard Form; Trail Making Test; Stroop; Wisconsin Card Sorting Test - 64; Guzman Judgement of Line Orientation Form V; Jam-Osterrieth Complex Figure; Clock Drawing; Dementia Rating Scale - 2 (DRS-2) Alternate Form;  MoCA v. 7.2; Cuevas Depression Inventory-2 (BDI-2), HAM-D, HAM-A, Cuevas Anxiety Inventory (ARLEEN).     RESULTS AND INTERPRETATION    Overall intellectual functioning was estimated to fall in the below average range,  somewhat below premorbid estimates of average based on single word reading abilities at a prior evaluation.  Performance on a screening measure of dementia was below average (DRS-2 Total Score = 125/144).  Performance on the MoCA fell below expected (19/30).  Performance on this task was notable for errors in naming, attention, repetition, abstraction, delayed recall, and orientation, and reduced fluency.    Confrontation naming was average for his age.  Ability to comprehend and articulate responses to complex social situations was below average for his age.  Letter fluency was exceptionally low. Generative naming to category was low average, and switching fluency was below average for his age.    Attention span was average for his age.  Divided attention was exceptionally low on a timed, visuomotor sequencing task, and was notable for difficulty shifting cognitive set. On an untimed, auditory sequencing task, divided attention was low average.  Performance on a measure of distractibility was low average.  Psychomotor processing speed was high average.    Basic visual perception, including matching lines and angles, was above average for his age.  Construction of a clock fell within normal limits.  Construction of a complex design was exceptionally low, and was notable for missing details and difficulty with planning and organization.  Nonverbal deductive reasoning was low average.    Novel problem-solving, including the ability to generate strategies and solutions, fell within normal limits for his age and level of education.    Immediate recall of verbal narrative material was below average. Recall of the learned material following a 30 minute delay was below average. Recognition memory on this task was below average.  On a multiple trial list learning task, immediate recall was below average, with below average recall following a 25-minute delay.  Recognition memory on this task was exceptionally low.  Immediate  "recall of visual material was exceptionally low, although retention of the learned material (100%) following a 25-minute delay fell within normal limits. Recognition memory on this task waslow average.    On the BDI-2, a self-report questionnaire, he endorsed minimal depressive symptomatology.  He endorsed minimal anxiety on the ARLEEN.    IMPRESSIONS AND RECOMMENDATIONS    Arley Butt is a 73 year old man with a history of essential tremor, who is s/p bilateral VIM DBS lead implantation in 9/2022 and 4/2023. Serial neuropsychological evaluations prior to his first surgery were suggestive of amnestic MCI, although by 4/14/2022, prior to his first surgery, memory was stable. His last evaluation in January 2023, prior to his second surgery, suggested improvements in verbal memory and declines in letter fluency, with performance otherwise remaining stable. He was noted to have a longer recovery after his second surgery, with more cognitive symptoms, which his family has seen improve more recently.    Results of today's evaluation indicate impairments in learning and memory, and executive dysfunction, characterized by difficulty with planning, organization, and set shifting. Language, visual processing, and attention fall within normal limits. He does not endorse significant depressive symptomatology or anxiety.    Compared to his most recent evaluation in January 2023, he has had declines in executive functioning, and on a screening measure of dementia, in part driven by executive dysfunction. He is less distractible, and performance otherwise remains stable across cognitive domains, including memory.     This pattern of performance continues to reflect an amnestic MCI, with increased executive dysfunction. This finding is consistent with reports of apparent behavior changes, such as getting \"fixated\" on ideas or tasks. This new finding is suggestive of frontal system involvement. Based on his records, the edema " surrounding the lead on his right side had improved significantly as of July 25. It is likely that the current finding of declines in executive functioning will continue to improve over time as well, consistent with his family's report of improvement over the last month. He will continue to be followed by neurology and neurosurgery.    In terms of daily functioning, Mr. Butt may require ongoing assistance with some of his instrumental activities of daily living for the time being. He has not driven since his second surgery. Given the executive dysfunction, including impairments on a visuomotor task of divided attention, it is recommended that he continue to refrain from driving for now. If his physicians note improvements in functioning over time, then a formal 's assessment with an occupational therapist is recommended prior to resuming driving. He may have difficulty managing large, complex tasks. Others may assist by breaking down such tasks into smaller, more manageable parts. He will likely benefit from structure and routine. He may benefit from the use of written reminders or checklists. He may also find it helpful to use a smart phone or similar device for alarms and reminders.    Results may serve as an updated baseline of his neurocognitive functioning, and the evaluation may be repeated in the future for comparison should a change in mental status occur.      Skylar Cabrera, Ph.D., ABPP  Licensed Psychologist, [id number]  Board Certified in Clinical Neuropsychology      Time spent: One unit of professional time, including interview (CPT 09076). 60 minutes (1 unit) neuropsychological testing evaluation by licensed and board-certified neuropsychologist, including integration of patient data, interpretation of standardized test results and clinical data, clinical decision-making, treatment planning, report, and interactive feedback to the patient, first hour (CPT 63142). 93 minutes (2 units) of  neuropsychological testing evaluation by licensed and board-certified neuropsychologist, including integration of patient data, interpretation of standardized test results and clinical data, clinical decision-making, treatment planning, report, and interactive feedback to the patient, subsequent hours (CPT 84753). 30 minutes of neuropsychological test administration and scoring by technician, first 30 minutes (CPT 00732). 244 additional minutes (8 units) neuropsychological test administration and scoring by technician, subsequent 30 minutes (CPT 91321). ICD-10 Diagnoses: G25; F06.8.

## 2023-08-18 NOTE — LETTER
8/18/2023      RE: Arley Butt  [address]  [facility] [address]       NEUROPSYCHOLOGICAL EVALUATION    RELEVANT HISTORY AND REASON FOR REFERRAL    Arley Butt is a 73 year old, right handed, retired  with 11 years of formal education. Information was obtained via interview with the patient and his daughter, and review of his medical records. Records indicate a history of essential tremor with onset about 6 years ago. He is s/p bilateral VIM DBS lead implantation in September 2022 and April 2023. 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 At a neurosurgery visit on July 25, 2023, it was noted that he had a longer recovery since his second side surgery with more cognitive symptoms after the recent surgery. He had a significant amount of edema around the lead on this side, thought likely to have affected cognition. Tremor was well controlled bilaterally, but the was confused for awhile, and as of July, still had some concerning behaviors, such as not knowing how to use a cane he was handed. While a CT scan a the time showed significantly improved edema, his daughter had not noticed much improvement in cognition over the previous month. He was referred for neuropsychological evaluation by Kaylynn Plummer M.D., for further characterization of any cognitive difficulties and to evaluate mood.     Records indicate that [facility]. Butt [facility] [place] and two evaluations under my direction as well. The report from his 7/30/2021 visit under the direction of Vinay Beckham, Ph.D., has been scanned into Epic. The findings were noted to reflect mild neurocognitive disorder, with performance demonstrating a slight decline as compared to 2017 testing in learning  and memory, language, attention and concentration, and processing speed. He had deficits in processing speed, with variable scores ranging from average to below average, although these may have been affected by motor functioning. Scores on learning and memory measures ranged from below average to exceptionally low for verbal material with relatively better performance for visual information, falling in the low average range. He underwent a neuropsychological evaluation under my direction on 4/14/2022 prior to surgery. As noted, results indicated prominent impairments in verbal learning and memory,while memory for visual information fell within normal limits. He was distractible. Performance otherwise fell within normal limits across cognitive domains. Compared to his July 2021 evaluation, verbal memory was generally stable, memory for visual information was stable or slightly improved, and he had less variability within cognitive domains. Finally, he underwent an evaluation on 1/31/2023, prior to his second surgery. Results indicated impairments in learning of new information.  Memory was impaired, but he sometimes benefitted from cues, suggesting retrieval difficulties.  Fluency was slowed.  Drawings and written tasks were notable for significant tremor using his right hand.  Basic language, visual processing, and attention all fell within normal limits.  He endorsed minimal depressive symptomatology and apathy. These findings reflected improvements in verbal memory, particularly on recognition tasks.  Letter fluency had declined, while semantic fluency and switching fluency remained stable.  Performance otherwise remained relatively stable across cognitive domains.    CLINICAL INTERVIEW FINDINGS    Upon interview, Mr. Butt stated that his second surgery went well and he has had good benefit to his tremor.  He does not think that he has had any cognitive changes.  His daughter noted that in June and July in  particular, he seemed to have difficulty processing information, and appeared confused.  He did things like wear his hearing aids in his shower, which he had never done before, and he did activities that were restricted after surgery, seemingly having forgotten that they were restricted.  He did not seem to remember why he was not driving.  They had to put up notes to remind him to shave and shower.  All of this has improved over time, however.  He occasionally repeats questions but generally his memory and word finding seem better.  His attention and concentration are not as good as they used to be, but seem to be improving.  It takes him longer to make decisions as he seems to take longer to process information.    Mr. Butt lives with his wife, and they share the management of their finances.  He denied any difficulty in this regard.  He manages his own medications without difficulty.  He cooks with his wife and denied any difficulty in this regard.  He has not driven since April and hopes to return to driving.  He handles his personal cares independently.    When asked to describe his mood, Mr. Butt stated that it is better in the last month.  His daughter noted that he was withdrawn and frustrated with his restrictions.  This seems to be improving in the last month and a half.  He is no longer feeling hopeless or helpless.  While he stated that he is not feeling anxious, his daughter noted that since June he seems to get fixated on things and cannot let them go.  For instance, he seems fixated on his truck keys, and on ducks which he is feeding and watering.  He would not slow down to take care of himself. Unfortunately his wife injured her arm and he pushed himself to get better so that he could care for her, but he was not doing well.  This seems to be better in the last month and a half, however.  His interest level is improving and he is doing things like spending time tinkering in the garage.  He is  sleeping well, eight hours a night, and he feels rested in the morning.  He occasionally naps, but not often.  His appetite has not been particularly good.  He seems to crave sweets more and he has gained some weight.  His energy level is fair.  He denied visual or auditory hallucinations.  He denied suicidal ideation.    Mr. Butt denied alcohol or tobacco use.  He has tried CBD gummies to help with sleep and with calming his mind, but these were not particularly helpful.    Since his last evaluation, Mr. Butt has not had any major illnesses or injuries.  He never had COVID.  He described his balance as good and stated that he has not been falling, although his daughter noted that he has had a couple of falls.  On July 26 he went for a walk and was focusing on adjusting his yifan pack, and fell on a curb.  On another occasion he fell and was caught by a wall.  His daughter noted that his blood pressure had been low but it has recently gotten up, which seems to be helping with his balance.  He denied unilateral weakness or numbness.  He has not been bothered by headaches.  He has some back pain.    Past Medical History    Past Medical History:   Diagnosis Date     Alcohol dependence (H)      Anemia      Anxiety      BPH (benign prostatic hyperplasia)      Cataract      Chronic pain      DVT (deep venous thrombosis) (H)      Dysphagia      Dysthymia      Erosive gastritis      Esophagitis      Essential tremor      Follicular lymphoma (H)      Foot sprain      Gastroesophageal reflux disease with esophagitis      HTN (hypertension)      Hyperlipidemia      Impacted cerumen      MCI (mild cognitive impairment)      Osteoarthritis      Presbyopia      PTSD (post-traumatic stress disorder)      Sensorineural hearing loss, bilateral      Tinnitus, bilateral      Urinary frequency      Current Medications    He has a current medication list which includes the following prescription(s): acetaminophen,  carboxymethylcellulose sodium, cholecalciferol, cyproheptadine, dexamethasone, diphenhydramine, diphenhydramine-acetaminophen, finasteride, folic acid, gabapentin, mirtazapine, NONFORMULARY, ondansetron, oxycodone, pantoprazole, polyethylene glycol, prazosin, pregabalin, sertraline, simvastatin, tamsulosin, and vitamin b-12.    Family History    Significant for a father who may have had tremor.  He noted that both of his parents drank a lot of alcohol. Records note that his mother had a stroke.  He has no known family history of dementia     BEHAVIORAL OBSERVATIONS    During the evaluation, Mr. Butt was pleasant, cooperative, and seemed to understand the instructions. He was alert and oriented to person, place, month, and year, but not to date.He wore hearing aids. Tremor was observed clinically, primarily in his right hand. Drawing tasks were scored liberally for this reason. Gait was slow, and he used a cane for ambulation. Mood was euthymic. Speech was fluent, with normal articulation, volume, and rate. He presented his thoughts in a clear, logical manner. Internal performance validity measures fell within normal limits. The results are believed to accurately reflect his current level of functioning.       MEASURES ADMINISTERED    The following measures were administered by a trained psychometrist, under the direct supervision of a licensed psychologist.    Subtests of the Wechsler Adult Intelligence Scale-4; subtests of the Wechsler Memory Scale-4; Lozano Verbal Learning Test-Revised, Form 4; Brief Visual Memory Test - Revised, Form 1; [place] Naming Test; D-KEFS Verbal Fluency, Standard Form; Trail Making Test; Stroop; [place] Card Sorting Test - 64; Guzman Judgement of Line Orientation Form V; Jam-Osterrieth Complex Figure; Clock Drawing; Dementia Rating Scale - 2 (DRS-2) Alternate Form;  MoCA v. 7.2; Cuevas Depression Inventory-2 (BDI-2), HAM-D, HAM-A, Cuevas Anxiety Inventory (ARLEEN).     RESULTS AND  INTERPRETATION    Overall intellectual functioning was estimated to fall in the below average range, somewhat below premorbid estimates of average based on single word reading abilities at a prior evaluation.  Performance on a screening measure of dementia was below average (DRS-2 Total Score = 125/144).  Performance on the MoCA fell below expected (19/30).  Performance on this task was notable for errors in naming, attention, repetition, abstraction, delayed recall, and orientation, and reduced fluency.    Confrontation naming was average for his age.  Ability to comprehend and articulate responses to complex social situations was below average for his age.  Letter fluency was exceptionally low. Generative naming to category was low average, and switching fluency was below average for his age.    Attention span was average for his age.  Divided attention was exceptionally low on a timed, visuomotor sequencing task, and was notable for difficulty shifting cognitive set. On an untimed, auditory sequencing task, divided attention was low average.  Performance on a measure of distractibility was low average.  Psychomotor processing speed was high average.    Basic visual perception, including matching lines and angles, was above average for his age.  Construction of a clock fell within normal limits.  Construction of a complex design was exceptionally low, and was notable for missing details and difficulty with planning and organization.  Nonverbal deductive reasoning was low average.    Novel problem-solving, including the ability to generate strategies and solutions, fell within normal limits for his age and level of education.    Immediate recall of verbal narrative material was below average. Recall of the learned material following a 30 minute delay was below average. Recognition memory on this task was below average.  On a multiple trial list learning task, immediate recall was below average, with below average  "recall following a 25-minute delay.  Recognition memory on this task was exceptionally low.  Immediate recall of visual material was exceptionally low, although retention of the learned material (100%) following a 25-minute delay fell within normal limits. Recognition memory on this task waslow average.    On the BDI-2, a self-report questionnaire, he endorsed minimal depressive symptomatology.  He endorsed minimal anxiety on the ARLEEN.    IMPRESSIONS AND RECOMMENDATIONS    Arley Butt is a 73 year old man with a history of essential tremor, who is s/p bilateral VIM DBS lead implantation in 9/2022 and 4/2023. Serial neuropsychological evaluations prior to his first surgery were suggestive of amnestic MCI, although by 4/14/2022, prior to his first surgery, memory was stable. His last evaluation in January 2023, prior to his second surgery, suggested improvements in verbal memory and declines in letter fluency, with performance otherwise remaining stable. He was noted to have a longer recovery after his second surgery, with more cognitive symptoms, which his family has seen improve more recently.    Results of today's evaluation indicate impairments in learning and memory, and executive dysfunction, characterized by difficulty with planning, organization, and set shifting. Language, visual processing, and attention fall within normal limits. He does not endorse significant depressive symptomatology or anxiety.    Compared to his most recent evaluation in January 2023, he has had declines in executive functioning, and on a screening measure of dementia, in part driven by executive dysfunction. He is less distractible, and performance otherwise remains stable across cognitive domains, including memory.     This pattern of performance continues to reflect an amnestic MCI, with increased executive dysfunction. This finding is consistent with reports of apparent behavior changes, such as getting \"fixated\" on ideas or " tasks. This new finding is suggestive of frontal system involvement. Based on his records, the edema surrounding the lead on his right side had improved significantly as of July 25. It is likely that the current finding of declines in executive functioning will continue to improve over time as well, consistent with his family's report of improvement over the last month. He will continue to be followed by neurology and neurosurgery.    In terms of daily functioning, Mr. Butt may require ongoing assistance with some of his instrumental activities of daily living for the time being. He has not driven since his second surgery. Given the executive dysfunction, including impairments on a visuomotor task of divided attention, it is recommended that he continue to refrain from driving for now. If his physicians note improvements in functioning over time, then a formal 's assessment with an occupational therapist is recommended prior to resuming driving. He may have difficulty managing large, complex tasks. Others may assist by breaking down such tasks into smaller, more manageable parts. He will likely benefit from structure and routine. He may benefit from the use of written reminders or checklists. He may also find it helpful to use a smart phone or similar device for alarms and reminders.    Results may serve as an updated baseline of his neurocognitive functioning, and the evaluation may be repeated in the future for comparison should a change in mental status occur.      Skylar Cabrera, Ph.D., [facility]  Licensed Psychologist, LP [id number]  Board Certified in Clinical Neuropsychology      Time spent: One unit of professional time, including interview (CPT 19697). 60 minutes (1 unit) neuropsychological testing evaluation by licensed and board-certified neuropsychologist, including integration of patient data, interpretation of standardized test results and clinical data, clinical decision-making, treatment planning,  report, and interactive feedback to the patient, first hour (CPT 90578). 93 minutes (2 units) of neuropsychological testing evaluation by licensed and board-certified neuropsychologist, including integration of patient data, interpretation of standardized test results and clinical data, clinical decision-making, treatment planning, report, and interactive feedback to the patient, subsequent hours (CPT 79771). 30 minutes of neuropsychological test administration and scoring by technician, first 30 minutes (CPT 64054). 244 additional minutes (8 units) neuropsychological test administration and scoring by technician, subsequent 30 minutes (CPT 21383). ICD-10 Diagnoses: G25; F06.8.        The patient was seen for neuropsychological evaluation at the request of Kaylynn Plummer MD for the purposes of diagnostic clarification and treatment planning. 274 minutes of test administration and scoring were provided by this writer. Please see Dr. Skylar Cabrera's report for a full interpretation of the findings.    Daisha Baer  Psychometrist      Skylar Cabrera, PhD LP

## 2023-08-25 NOTE — CONFIDENTIAL NOTE
Patient Arley Butt  [address]    MRN [id number]   Provider      50   [address] [place]    Sex Male   Occupation     Education 11   Language     Preferred Hand Right   Station OP    Age 73                 DEMENTIA RATING SCALE - 2   TEST FORM Alternate     Raw MAS       Attention 36 11       Init/Psv 36 10       Construct 6 10       Concept 31 6       Memory 16 2       Total / 144 125 5                WAIS-IV          Raw ScS       Comprehension 12 5       Letter Num Seq 14 7       Digit Span 21 8       Matrix Reasoning 7 7                WECHSLER MEMORY SCALE - IV         Raw ScS/%ile       Info/Orientation 13        Logical Memory I 17 5       Logical Memory II 5 4       LM Recognition  14 3-9                BOSTON NAMING TEST         Score (Correct+Stim Cue)  55 MAS 11     # Correct Stimulus Cue  0 T 53.76     # Correct Phonemic Cue  2                D-KEFS VERBAL FLUENCY    TEST FORM Standard     Raw ScS       Letter Fluency 13 3       Category Fluency 23 6       Switching Fluency             Total Correct 8 5       Switching Accuracy 8 7                CLOCK DRAWING         Command 3 /3 Rating       Copy 3 /3 Rating               TRAIL MAKING TEST          Seconds Errors MAS z     Trails A 30 0 13 0.75     Trails B 266 5 2 -2.14              STROOP          Raw T MAS      Word 70 33 7      Color  54 38 9      Color/Word 22 42 7      Interference -8 42                [place] CARD SORTING TEST - 1 deck       # Categories 2 %ile >16      # Persev Error 18 T 43      Concept. Lev Resp. 27 T 43      FMS 0                 GUZMAN JUDGMENT OF LINE ORIENTATION  TEST FORM V    Raw Score 27 Raw Correction 30     MAS 14 Guzman %ile 86              HVLT-R    TEST FORM 4     Raw T       Trial 1 2        Trial 2 6        Trial 3 6        Total 1-3 14 33       Learning 4        Delay 3 31       Percent Retained 50% 33       True Positives 9        Discrimination Index 6 26       False Positives 3                 BRIEF  VISUAL MEMORY TEST - REVISED  TEST FORM 1     Raw T       Trial 1 1        Trial 2 2        Trial 3 4        Total 1-3 7 27       Learning 3 46       Delay 4 35       Percent Retained 100% >16 %ile      Recognition Hits 5        Discrimination Index 4 11-16 %ile      False Alarms 1                 PAXTON-O COMPLEX FIGURE TEST          Raw %ile       Copy  20 <1       Time to Copy 134 >16                MAGGIE COGNITVE ASSESSMENT (MOCA)  TEST FORM 7.2    Score 19 /30                BDI         Score 2        Interpretation Minimal                 ARLEEN         Score 2        Interpretation Minimal

## 2023-09-11 ENCOUNTER — HOSPITAL ENCOUNTER (OUTPATIENT)
Dept: MRI IMAGING | Facility: CLINIC | Age: 73
Discharge: HOME | End: 2023-09-11
Attending: NURSE PRACTITIONER | Admitting: NURSE PRACTITIONER
Payer: COMMERCIAL

## 2023-09-11 DIAGNOSIS — M54.50 LOW BACK PAIN, UNSPECIFIED: ICD-10-CM

## 2023-09-11 PROCEDURE — 72148 MRI LUMBAR SPINE W/O DYE: CPT | Mod: 26 | Performed by: RADIOLOGY

## 2023-09-11 PROCEDURE — 72148 MRI LUMBAR SPINE W/O DYE: CPT

## 2023-09-19 ENCOUNTER — TELEPHONE (OUTPATIENT)
Dept: NEUROSURGERY | Facility: CLINIC | Age: 73
End: 2023-09-19

## 2023-09-19 NOTE — TELEPHONE ENCOUNTER
Returned call to pt's daughter. Pt may resume cortisone shots for arthritis. Daughter asked if Dr. Cabrera has sent pt's neuropsych evaluation report. I let her know [facility]     She asked when the neuropsych eval should be repeated. Dr. Cabrera may have a recommendation about that. We can discuss the appropriate follow-up when pt returns to clinic on 10/31.     No further questions at this time.

## 2023-10-31 ENCOUNTER — ANCILLARY PROCEDURE (OUTPATIENT)
Dept: CT IMAGING | Facility: CLINIC | Age: 73
End: 2023-10-31
Attending: NEUROLOGICAL SURGERY
Payer: COMMERCIAL

## 2023-10-31 ENCOUNTER — OFFICE VISIT (OUTPATIENT)
Dept: NEUROSURGERY | Facility: CLINIC | Age: 73
End: 2023-10-31
Payer: COMMERCIAL

## 2023-10-31 VITALS
DIASTOLIC BLOOD PRESSURE: 85 MMHG | HEART RATE: 73 BPM | SYSTOLIC BLOOD PRESSURE: 132 MMHG | WEIGHT: 204.9 LBS | OXYGEN SATURATION: 95 % | BODY MASS INDEX: 33.69 KG/M2

## 2023-10-31 DIAGNOSIS — R41.89 COGNITIVE CHANGE: ICD-10-CM

## 2023-10-31 DIAGNOSIS — G25.0 ESSENTIAL TREMOR: Primary | ICD-10-CM

## 2023-10-31 DIAGNOSIS — Z96.89 S/P DEEP BRAIN STIMULATOR PLACEMENT: ICD-10-CM

## 2023-10-31 PROCEDURE — 99213 OFFICE O/P EST LOW 20 MIN: CPT | Performed by: NEUROLOGICAL SURGERY

## 2023-10-31 PROCEDURE — 70450 CT HEAD/BRAIN W/O DYE: CPT | Mod: GC | Performed by: RADIOLOGY

## 2023-10-31 RX ORDER — DULOXETIN HYDROCHLORIDE 60 MG/1
60 CAPSULE, DELAYED RELEASE ORAL DAILY
COMMUNITY

## 2023-10-31 ASSESSMENT — PAIN SCALES - GENERAL: PAINLEVEL: NO PAIN (0)

## 2023-10-31 NOTE — LETTER
10/31/2023       RE: Arley Butt  [address] [facility] [address]       Dear Colleague,    Thank you for referring your patient, Arley Butt, to the [facility] NEUROS[facility]IC [place] at [facility]. Please see a copy of my visit note below.    Neurosurgery Attending DBS Follow-Up Note     HPI: Arley Butt is a 72 y/o gentleman s/p bilateral ViM DBS (BSci Genus R16) with his second side surgery April 2023 (~6 months ago) who presents for f/u. He has had a longer recovery since his second side surgery with more cognitive symptoms after the more recent surgery. He had a significant amount of edema around the lead on this side which is likely impacting his cognition. Steroids may have helped a bit, but they did not notice a huge difference. Tremor is well controlled bilaterally, but he was confused for a while and still has had some behavior that his daughter is worried about (being handed a cane, not knowing how to use it). His daughter has noticed much more improvement since his last visit. He also now recognizes when he is having difficulty processing information, which is an improvement when he did not have much awareness of these difficulties. An interval evaluation with Dr. Cabrera demonstrated worsened executive dysfunction compared to before his second side surgery. This certainly aligns with his postoperative symptoms. He has a CT from this morning which shows no residual edema. He and his daughter are wondering how to proceed further with regards to driving.      Exam:  Awake, alert, oriented x 3  Attends, follows, fluent  PERRL  EOMI  FS  TML  BUE/BLE 5/5, no drift  Wounds all c/d/i, no erythema, swelling or tenderness     UPDRS/TETRAS outcome: [facility]     A/P: 72 y/o gentleman with ET s/p bilateral ViM DBS, slowly recovering but doing well. The lead edema has resolved on imaging and so I would  expect him to continue to improve. As we discussed at the last visit, there can be lag time between radiological improvement and subsequent clinical improvement. I will defer further cognitive evaluations to Dr. Cabrera and her team. I do not think that he needs any further imaging. [facility] or [facility] depending on what is available or convenient for them.     - RTC prn  - Programming per movement disorders team          Again, thank you for allowing me to participate in the care of your patient.      Sincerely,    Manuel Otero MD

## 2023-10-31 NOTE — PROGRESS NOTES
Neurosurgery Attending DBS Follow-Up Note     HPI: Arley Butt is a 72 y/o gentleman s/p bilateral ViM DBS ([employer]Sc[employer] Genus R16) with his second side surgery April 2023 (~6 months ago) who presents for f/u. He has had a longer recovery since his second side surgery with more cognitive symptoms after the more recent surgery. He had a significant amount of edema around the lead on this side which is likely impacting his cognition. Steroids may have helped a bit, but they did not notice a huge difference. Tremor is well controlled bilaterally, but he was confused for a while and still has had some behavior that his daughter is worried about (being handed a cane, not knowing how to use it). His daughter has noticed much more improvement since his last visit. He also now recognizes when he is having difficulty processing information, which is an improvement when he did not have much awareness of these difficulties. An interval evaluation with Dr. Cabrera demonstrated worsened executive dysfunction compared to before his second side surgery. This certainly aligns with his postoperative symptoms. He has a CT from this morning which shows no residual edema. He and his daughter are wondering how to proceed further with regards to driving.      Exam:  Awake, alert, oriented x 3  Attends, follows, fluent  PERRL  EOMI  FS  TML  BUE/BLE 5/5, no drift  Wounds all c/d/i, no erythema, swelling or tenderness     UPDRS/TETRAS outcome: [facility]     A/P: 72 y/o gentleman with ET s/p bilateral ViM DBS, slowly recovering but doing well. The lead edema has resolved on imaging and so I would expect him to continue to improve. As we discussed at the last visit, there can be lag time between radiological improvement and subsequent clinical improvement. I will defer further cognitive evaluations to Dr. Cabrera and her team. I do not think that he needs any further imaging. D[facility]als [facility] be  [facility] or [facility] depending on what is available or convenient for them.     - RTC prn  - Programming per movement disorders team

## 2023-12-05 ENCOUNTER — TELEPHONE (OUTPATIENT)
Dept: NEUROLOGY | Facility: CLINIC | Age: 73
End: 2023-12-05

## 2024-01-12 ENCOUNTER — TELEPHONE (OUTPATIENT)
Dept: NEUROLOGY | Facility: CLINIC | Age: 74
End: 2024-01-12

## 2024-01-12 NOTE — TELEPHONE ENCOUNTER
[facility] Call Center    Phone Message    May a detailed message be left on voicemail: yes     Reason for Call: Other: Svitlana is calling to reschedule DBS programming. Please contact patient when able.     Action Taken: Other: Neurology    Travel Screening: Not Applicable                                                                   
Spoke to Svitlana and she stated she would like to reschedule the patient's 1/9 appointment with Dr. Plummer since she will not have time to take the patient to his appointment and the patient is not having any concerns at this time. The patient was rescheduled to 2/28/24 at 12 PM with Dr. Plummer.     Svitlana asked to verify that there was an appointment with Dr. Cabrera. Svitlana stated Dr. Otero had recommended that the patient be seen. Informed Svitlana that based on Dr. Otero's last note on 10/31/23, there is no mention that an appointment was needed and that if there were any cognitive concerns then he would defer that to Dr. Cabrera. Informed Svitlana the writer will check with Dr. Cabrera and if there is an appointment that is needed then she will call her back.  
Svitlana left the writer a message for the writer to call back in regards to the rescheduled programming visit that was needed.    Spoke to the Svitlana and informed her the programming was scheduled when they had last spoke, for 2/28 at 12 PM with Dr. Plummer. Informed Svitlana that the appointment that she thinks Svitlana was referring to is the neuropsychological evaluation. Informed Svitlana that a neuropsychological evaluation was not needed. Svitlana verbalized her understanding and had no further questions.   
Svitlana, patient's daughter, left the writer a detailed message asking for a call back in regards to the patient's upcoming programming visit with Dr. Plummer.  
49.9

## 2024-01-12 NOTE — TELEPHONE ENCOUNTER
Spoke to Svitlana and the patient was reschedule the patient's appointment with Dr. Plummer on 2/28/24 to 1/29/24 at 11 AM. Svitlana stated she may call back to reschedule but she is not sure yet. Svitlana stated she will call the writer back should she need to reschedule.

## 2024-01-29 ENCOUNTER — OFFICE VISIT (OUTPATIENT)
Dept: NEUROLOGY | Facility: CLINIC | Age: 74
End: 2024-01-29
Payer: COMMERCIAL

## 2024-01-29 VITALS
DIASTOLIC BLOOD PRESSURE: 71 MMHG | SYSTOLIC BLOOD PRESSURE: 117 MMHG | BODY MASS INDEX: 32.31 KG/M2 | WEIGHT: 196.5 LBS | RESPIRATION RATE: 16 BRPM | HEART RATE: 67 BPM | OXYGEN SATURATION: 96 %

## 2024-01-29 DIAGNOSIS — R41.89 COGNITIVE CHANGES: ICD-10-CM

## 2024-01-29 DIAGNOSIS — Z96.89 S/P DEEP BRAIN STIMULATOR PLACEMENT: ICD-10-CM

## 2024-01-29 DIAGNOSIS — G25.0 ESSENTIAL TREMOR: Primary | ICD-10-CM

## 2024-01-29 PROCEDURE — 99212 OFFICE O/P EST SF 10 MIN: CPT | Mod: 25 | Performed by: STUDENT IN AN ORGANIZED HEALTH CARE EDUCATION/TRAINING PROGRAM

## 2024-01-29 PROCEDURE — 95983 ALYS BRN NPGT PRGRMG 15 MIN: CPT | Performed by: STUDENT IN AN ORGANIZED HEALTH CARE EDUCATION/TRAINING PROGRAM

## 2024-01-29 RX ORDER — TRIAMCINOLONE ACETONIDE 1 MG/G
CREAM TOPICAL 2 TIMES DAILY PRN
COMMUNITY
Start: 2024-01-19

## 2024-01-29 RX ORDER — DOCUSATE SODIUM 100 MG/1
100 CAPSULE, LIQUID FILLED ORAL 2 TIMES DAILY PRN
COMMUNITY
Start: 2024-01-15

## 2024-01-29 RX ORDER — LANOLIN ALCOHOL/MO/W.PET/CERES
3 CREAM (GRAM) TOPICAL
COMMUNITY
Start: 2024-01-19

## 2024-01-29 RX ORDER — ACAMPROSATE CALCIUM 333 MG/1
333 TABLET, DELAYED RELEASE ORAL 3 TIMES DAILY
COMMUNITY
Start: 2024-01-22

## 2024-01-29 RX ORDER — LIDOCAINE 50 MG/G
OINTMENT TOPICAL 2 TIMES DAILY
COMMUNITY
Start: 2024-01-19

## 2024-01-29 RX ORDER — SENNOSIDES A AND B 8.6 MG/1
8.6 TABLET, FILM COATED ORAL DAILY PRN
COMMUNITY
Start: 2024-01-19

## 2024-01-29 RX ORDER — PROPYLENE GLYCOL 0.06 MG/ML
1 EMULSION OPHTHALMIC 4 TIMES DAILY PRN
COMMUNITY
Start: 2024-01-19

## 2024-01-29 ASSESSMENT — ACTIVITIES OF DAILY LIVING (ADL)
POURING: (0) NORMAL
CARRYING_FOOD_TRAYS,_PLATES_OR_SIMILAR_ITEMS: (0) NORMAL
OVERALL_DISABILITY_WITH_THE_MOST_AFFECTED_TASK: (0) NORMAL
ADL_TOTAL: 2
DRESS: (0) NORMAL
DRINKING_FROM_A_GLASS: (0) NORMAL
WORKING: (0) NORMAL
FEEDING_WITH_A_SPOON: (0) NORMAL
FEEDING_WITH_A_SPOON: (0) NORMAL
WRITING: (2) MILDLY ABNORMAL. DIFFICULTY WRITING DUE TO THE TREMOR.
SOCIAL_IMPACT: (0) NORMAL
DRESS: (0) NORMAL
SOCIAL_IMPACT: (0) NORMAL
USING_KEYS: (0) NORMAL
SPEAKING: (0) NORMAL
CARRYING_FOOD_TRAYS_PLATES_OR_SIMILAR_ITEMS: (0) NORMAL
HYGIENE: (0) NORMAL
HYGIENE: (0) NORMAL

## 2024-01-29 ASSESSMENT — PAIN SCALES - GENERAL: PAINLEVEL: MODERATE PAIN (5)

## 2024-01-29 NOTE — NURSING NOTE
Chief Complaint   Patient presents with    DBS Programming     /71 (BP Location: Right arm, Patient Position: Sitting, Cuff Size: Adult Regular)   Pulse 67   Resp 16   Wt 89.1 kg (196 lb 8 oz)   SpO2 96%   BMI 32.31 kg/m      YO ESQUEDA

## 2024-01-29 NOTE — LETTER
2024       RE: Arley Butt  [address] [place] [id number]       Dear Colleague,    Thank you for referring your patient, Arley Butt, to the [facility]UR[facility] [place] at [facility]. Please see a copy of my visit note below.    Department of Neurology  Movement Disorders Division   DBS Follow-up Note    Patient: Arley Butt  MRN: [id number]   : 1950   Date of Visit: 2024    Diagnosis: Essential tremor  DBS Target(s): Bilateral VIM  Date(s) of DBS Lead Placement: L 2022, R 2023  Date(s) of IPG Placement: L chest 10/4/2022  Device: [employer] Genus      Chief Complaint:  Arley Butt is a 73 year old male who returns to clinic for follow up of ET status post bilateral VIM DBS.  He was last seen 2023 at which time no changes were made.    Interval History:  Sometimes has pain over the wires in the scalp.  Once or twice a week.  Aspirin or Tylenol helps.  No hx of headaches prior to surgery.    Otherwise he has been doing well.  Only present while writing.  Taking Am gabapentin has been helpful.    He has been steadier.  Cognition is improved since surgery but not back to prior baseline.       Tremor ADL Scale  1. Speaking 0 (0) Normal   2. Feeding with a spoon 0 (0) Normal   3. Drinking from a glass 0 (0) Normal   4. Hygiene 0 (0) Normal   5. Dressing 0 (0) Normal   6. Pouring 0 (0) Normal   7. Carrying food trays, plates or similar items 0 (0) Normal   8. Using keys 0 (0) Normal   9. Writing 2 (2) Mildly abnormal. Difficulty writing due to the tremor.   10. Working 0 (0) Normal   11. Overall disability with the most affected task 0 (0) Normal   Name of most affected task       12. Social impact 0 (0) Normal   ADL TOTAL 2    Prior: 1 on 2023      Relevant Medications AM Bedtime   Gabapentin 300mg 1 2   Last taken: this morning      Medications:  Current Outpatient Medications   Medication Sig  Dispense Refill    acamprosate (CAMPRAL) 333 MG EC tablet Take 333 mg by mouth 3 times daily      acetaminophen (TYLENOL) 325 MG tablet Take 2 tablets (650 mg) by mouth every 4 hours as needed for other (For optimal non-opioid multimodal pain management to improve pain control.) 30 tablet 0    cholecalciferol 50 MCG (2000 UT) tablet TAKE ONE TABLET BY MOUTH DAILY FOR VITAMIN D SUPPLEMENT      cyproheptadine (PERIACTIN) 4 MG tablet Take 12 mg by mouth At Bedtime      diclofenac (VOLTAREN) 1 % topical gel Apply 2 g topically 4 times daily      docusate sodium (DSS) 100 MG capsule Take 100 mg by mouth 2 times daily as needed for constipation      DULoxetine (CYMBALTA) 60 MG capsule Take 60 mg by mouth daily Pt will start 11/1/2023 if ok by       finasteride (PROSCAR) 5 MG tablet Take 1 tablet by mouth daily      folic acid (FOLVITE) 1 MG tablet Take 1 tablet by mouth daily      gabapentin (NEURONTIN) 300 MG capsule Dosing as follows:    AM: 300 mg   Bedtime: 600 mg (300 mg capsule x2)      lidocaine (XYLOCAINE) 5 % external ointment Apply topically 2 times daily      melatonin 3 MG tablet Take 3 mg by mouth nightly as needed for sleep      mirtazapine (REMERON) 30 MG tablet Take 15 mg by mouth at bedtime      Multiple Vitamins-Minerals (MULTIVITAMIN-MINERALS PO) Take 1 tablet by mouth daily      NONFORMULARY Nocturnal CPAP with 2L oxygen      ondansetron (ZOFRAN ODT) 4 MG ODT tab Take 1 tablet (4 mg) by mouth every 6 hours as needed for nausea or vomiting (Patient taking differently: Take 8 mg by mouth 3 times daily as needed for nausea or vomiting) 10 tablet 0    pantoprazole (PROTONIX) 40 MG EC tablet Take 40 mg by mouth 2 times daily      polyethylene glycol (MIRALAX) 17 g packet Take 17 g by mouth daily 30 packet 0    prazosin (MINIPRESS) 5 MG capsule Take 10 mg by mouth At Bedtime      propylene glycol (SYSTANE BALANCE) 0.6 % SOLN ophthalmic solution Apply 1 drop to eye 4 times daily as needed for dry  eyes      senna (SENOKOT) 8.6 MG tablet Take 8.6 mg by mouth daily as needed for constipation      simvastatin (ZOCOR) 20 MG tablet Take 20 mg by mouth At Bedtime      tamsulosin (FLOMAX) 0.4 MG capsule Take 1 capsule by mouth daily      triamcinolone (KENALOG) 0.1 % external cream Apply topically 2 times daily as needed      Carboxymethylcellulose Sodium 1 % GEL Apply 1 drop to eye 4 times daily (Patient not taking: Reported on 1/29/2024)      dexamethasone (DECADRON) 2 MG tablet Take 3 tabs by mouth daily x 3 days, then 2 tabs daily x 3 days, then 1 tab daily x 3 days, then 1/2 tab daily x 3 days. (Patient not taking: Reported on 6/7/2023) 20 tablet 0    diphenhydrAMINE (BENADRYL) 25 MG capsule Take 1-2 capsules (25-50 mg) by mouth nightly as needed for sleep (Patient not taking: Reported on 4/26/2023) 10 capsule 0    diphenhydrAMINE-acetaminophen (TYLENOL PM)  MG tablet Take 1-2 tablets by mouth nightly as needed for sleep (Patient not taking: Reported on 1/29/2024)      oxyCODONE (ROXICODONE) 5 MG tablet Take 1 tablet (5 mg) by mouth every 4 hours as needed for moderate pain (Patient not taking: Reported on 4/26/2023) 10 tablet 0    Pregabalin (LYRICA) 200 MG capsule 200 mg (Patient not taking: Reported on 10/31/2023)      sertraline (ZOLOFT) 50 MG tablet Take 150 mg by mouth every morning Will start to lean off 11/1/2023 to start duloxetine (Patient not taking: Reported on 1/29/2024)      vitamin B-12 (CYANOCOBALAMIN) 1000 MCG tablet Take 1,000 mcg by mouth daily (Patient not taking: Reported on 1/29/2024)          Allergies: has No Known Allergies.    Physical Exam:  The patient's  weight is 89.1 kg (196 lb 8 oz). His blood pressure is 117/71 and his pulse is 67. His respiration is 16 and oxygen saturation is 96%.       Neurological Examination:   Last medication dose: this morning   7/11/2023  11:00 AM 1/29/2024  10:00 AM   Tremor Motor Scale     Assessment Time 11:17  10:54    Medication Off  On    DBS  - Right Brain On  On    DBS - Left Brain On  On    Head 0  0    Face & Jaw 0  0    Voice 0  0.5    Outstretched - RIGHT 0.5  0.5    Outstretched - LEFT 1  1    Wingbeating - RIGHT 0  0    Wingbeating - LEFT 1  1    Kinetic - RIGHT 0.5  0.5    Kinetic - LEFT 1  1    Lower Limb - RIGHT 0  0    Lower Limb - LEFT 0  0    Lower Limb (Max) 0  0    Spiral - RIGHT 0.5  1    Spiral - LEFT 1  2    Handwriting 3  1.5    Dot approx - RIGHT 1  0.5    Dot approx - LEFT 0.5  1    Trunk (Standing) 0  0    Total Right 2.5  2.5    Total Left 4.5  6    Axial 0  0.5    TOTAL 10  10.5          Procedure: DBS Interrogation & Programming  Lead(s):     Left Right   DBS Target VIM VIM   DBS Lead Type       Lead Implant Date 9/26/2022 4/11/2023      IPG(s):    1   IPG Genus R16   IPG Implant Date 10/4/2022   Location Left chest   Battery (V) /      Impedance Check: No problems found.  See scanned report for impedance details.     Program 1 Left Brain         Right brain       Initial Final Initial Final     Active Active Active Active   Amplitude (mA) 3.0 [0-3.0] 3.0 [0-3.0] 0.1 [0-4.9] 3.0 [0-4.9]   Pulse width (usec) 60 60 60 60   Freq (Hz) 130 130 130 130   Contacts: C+L3_5, 7- C+L3_5,7- C+L4- C+L4-      Program 2 Left Brain         Right brain       Initial Final Initial Final     Inactive Inactive Inactive Inactive   Amplitude /mA) 2.5 [0-2.5] 2.5 [0-2.5] 0.1 [0-4.9] 3.0 [0-4.9]   Pulse width (usec) 60 60 60 60   Freq (Hz) 130 130 130 130   Contacts: C+L3_6,7- C+L3_6,7- C+L4- C+L4-      Program 3 Left Brain         Right brain       Initial Final Initial Final     Inactive Inactive Inactive Inactive   Amplitude /mA) 3.5 [0-3.5] 3.5 [0-3.5] 0.1 [0-4.9] 3.0 [0-4.9]   Pulse width (usec) 60 60 60 60   Freq (Hz) 130 130 130 130   Contacts: C+L3_5- C+L3_5- C+L4- C+L4-           Assessment/Plan:  Arley Butt is a 73 year old male with ET s/p bilateral VIM DBS who returns for follow-up.  Today I increased the right VIM current which led to  tremor improvement.  Cognitive changes are improving but not back to baseline.    - Continue gabapentin as is  - RVIM increased from 0.1 to 3.0  - RTC 6 months, 1 hr DBS programming         18 minutes spent on the date of the encounter doing chart review, history and exam, documentation and further activities as noted above.     Additional time spent for separate DBS programming: 10 min DBS analyzed with reprogramming.          Again, thank you for allowing me to participate in the care of your patient.      Sincerely,    Kaylynn Plummer MD

## 2024-01-29 NOTE — PATIENT INSTRUCTIONS
Today I increased your left hand to help with a little bit of tremor.  You're still on Program.  Both hands are now at 3.0.  Check your battery every week.  You may need to charge more frequently now.

## 2024-01-29 NOTE — PROGRESS NOTES
Department of Neurology  Movement Disorders Division   DBS Follow-up Note    Patient: Arley Butt  MRN: [id number]   : 1950   Date of Visit: 2024    Diagnosis: Essential tremor  DBS Target(s): Bilateral VIM  Date(s) of DBS Lead Placement: L 2022, R 2023  Date(s) of IPG Placement: L chest 10/4/2022  Device: [employer]      Chief Complaint:  Arley Butt is a 73 year old male who returns to clinic for follow up of ET status post bilateral VIM DBS.  He was last seen 2023 at which time no changes were made.    Interval History:  Sometimes has pain over the wires in the scalp.  Once or twice a week.  Aspirin or Tylenol helps.  No hx of headaches prior to surgery.    Otherwise he has been doing well.  Only present while writing.  Taking Am gabapentin has been helpful.    He has been steadier.  Cognition is improved since surgery but not back to prior baseline.       Tremor ADL Scale  1. Speaking 0 (0) Normal   2. Feeding with a spoon 0 (0) Normal   3. Drinking from a glass 0 (0) Normal   4. Hygiene 0 (0) Normal   5. Dressing 0 (0) Normal   6. Pouring 0 (0) Normal   7. Carrying food trays, plates or similar items 0 (0) Normal   8. Using keys 0 (0) Normal   9. Writing 2 (2) Mildly abnormal. Difficulty writing due to the tremor.   10. Working 0 (0) Normal   11. Overall disability with the most affected task 0 (0) Normal   Name of most affected task       12. Social impact 0 (0) Normal   ADL TOTAL 2    Prior: 1 on 2023      Relevant Medications AM Bedtime   Gabapentin 300mg 1 2   Last taken: this morning      Medications:  Current Outpatient Medications   Medication Sig Dispense Refill    acamprosate (CAMPRAL) 333 MG EC tablet Take 333 mg by mouth 3 times daily      acetaminophen (TYLENOL) 325 MG tablet Take 2 tablets (650 mg) by mouth every 4 hours as needed for other (For optimal non-opioid multimodal pain management to improve pain control.) 30 tablet 0    cholecalciferol 50  MCG (2000 UT) tablet TAKE ONE TABLET BY MOUTH DAILY FOR VITAMIN D SUPPLEMENT      cyproheptadine (PERIACTIN) 4 MG tablet Take 12 mg by mouth At Bedtime      diclofenac (VOLTAREN) 1 % topical gel Apply 2 g topically 4 times daily      docusate sodium (DSS) 100 MG capsule Take 100 mg by mouth 2 times daily as needed for constipation      DULoxetine (CYMBALTA) 60 MG capsule Take 60 mg by mouth daily Pt will start 11/1/2023 if ok by       finasteride (PROSCAR) 5 MG tablet Take 1 tablet by mouth daily      folic acid (FOLVITE) 1 MG tablet Take 1 tablet by mouth daily      gabapentin (NEURONTIN) 300 MG capsule Dosing as follows:    AM: 300 mg   Bedtime: 600 mg (300 mg capsule x2)      lidocaine (XYLOCAINE) 5 % external ointment Apply topically 2 times daily      melatonin 3 MG tablet Take 3 mg by mouth nightly as needed for sleep      mirtazapine (REMERON) 30 MG tablet Take 15 mg by mouth at bedtime      Multiple Vitamins-Minerals (MULTIVITAMIN-MINERALS PO) Take 1 tablet by mouth daily      NONFORMULARY Nocturnal CPAP with 2L oxygen      ondansetron (ZOFRAN ODT) 4 MG ODT tab Take 1 tablet (4 mg) by mouth every 6 hours as needed for nausea or vomiting (Patient taking differently: Take 8 mg by mouth 3 times daily as needed for nausea or vomiting) 10 tablet 0    pantoprazole (PROTONIX) 40 MG EC tablet Take 40 mg by mouth 2 times daily      polyethylene glycol (MIRALAX) 17 g packet Take 17 g by mouth daily 30 packet 0    prazosin (MINIPRESS) 5 MG capsule Take 10 mg by mouth At Bedtime      propylene glycol (SYSTANE BALANCE) 0.6 % SOLN ophthalmic solution Apply 1 drop to eye 4 times daily as needed for dry eyes      senna (SENOKOT) 8.6 MG tablet Take 8.6 mg by mouth daily as needed for constipation      simvastatin (ZOCOR) 20 MG tablet Take 20 mg by mouth At Bedtime      tamsulosin (FLOMAX) 0.4 MG capsule Take 1 capsule by mouth daily      triamcinolone (KENALOG) 0.1 % external cream Apply topically 2 times daily as  needed      Carboxymethylcellulose Sodium 1 % GEL Apply 1 drop to eye 4 times daily (Patient not taking: Reported on 1/29/2024)      dexamethasone (DECADRON) 2 MG tablet Take 3 tabs by mouth daily x 3 days, then 2 tabs daily x 3 days, then 1 tab daily x 3 days, then 1/2 tab daily x 3 days. (Patient not taking: Reported on 6/7/2023) 20 tablet 0    diphenhydrAMINE (BENADRYL) 25 MG capsule Take 1-2 capsules (25-50 mg) by mouth nightly as needed for sleep (Patient not taking: Reported on 4/26/2023) 10 capsule 0    diphenhydrAMINE-acetaminophen (TYLENOL PM)  MG tablet Take 1-2 tablets by mouth nightly as needed for sleep (Patient not taking: Reported on 1/29/2024)      oxyCODONE (ROXICODONE) 5 MG tablet Take 1 tablet (5 mg) by mouth every 4 hours as needed for moderate pain (Patient not taking: Reported on 4/26/2023) 10 tablet 0    Pregabalin (LYRICA) 200 MG capsule 200 mg (Patient not taking: Reported on 10/31/2023)      sertraline (ZOLOFT) 50 MG tablet Take 150 mg by mouth every morning Will start to lean off 11/1/2023 to start duloxetine (Patient not taking: Reported on 1/29/2024)      vitamin B-12 (CYANOCOBALAMIN) 1000 MCG tablet Take 1,000 mcg by mouth daily (Patient not taking: Reported on 1/29/2024)          Allergies: has No Known Allergies.    Physical Exam:  The patient's  weight is 89.1 kg (196 lb 8 oz). His blood pressure is 117/71 and his pulse is 67. His respiration is 16 and oxygen saturation is 96%.       Neurological Examination:   Last medication dose: this morning   7/11/2023  11:00 AM 1/29/2024  10:00 AM   Tremor Motor Scale     Assessment Time 11:17  10:54    Medication Off  On    DBS - Right Brain On  On    DBS - Left Brain On  On    Head 0  0    Face & Jaw 0  0    Voice 0  0.5    Outstretched - RIGHT 0.5  0.5    Outstretched - LEFT 1  1    Wingbeating - RIGHT 0  0    Wingbeating - LEFT 1  1    Kinetic - RIGHT 0.5  0.5    Kinetic - LEFT 1  1    Lower Limb - RIGHT 0  0    Lower Limb - LEFT 0  0     Lower Limb (Max) 0  0    Spiral - RIGHT 0.5  1    Spiral - LEFT 1  2    Handwriting 3  1.5    Dot approx - RIGHT 1  0.5    Dot approx - LEFT 0.5  1    Trunk (Standing) 0  0    Total Right 2.5  2.5    Total Left 4.5  6    Axial 0  0.5    TOTAL 10  10.5          Procedure: DBS Interrogation & Programming  Lead(s):     Left Right   DBS Target VIM VIM   DBS Lead Type       Lead Implant Date 9/26/2022 4/11/2023      IPG(s):    1   IPG Genus R16   IPG Implant Date 10/4/2022   Location Left chest   Battery (V) /      Impedance Check: No problems found.  See scanned report for impedance details.     Program 1 Left Brain         Right brain       Initial Final Initial Final     Active Active Active Active   Amplitude (mA) 3.0 [0-3.0] 3.0 [0-3.0] 0.1 [0-4.9] 3.0 [0-4.9]   Pulse width (usec) 60 60 60 60   Freq (Hz) 130 130 130 130   Contacts: C+L3_5, 7- C+L3_5,7- C+L4- C+L4-      Program 2 Left Brain         Right brain       Initial Final Initial Final     Inactive Inactive Inactive Inactive   Amplitude /mA) 2.5 [0-2.5] 2.5 [0-2.5] 0.1 [0-4.9] 3.0 [0-4.9]   Pulse width (usec) 60 60 60 60   Freq (Hz) 130 130 130 130   Contacts: C+L3_6,7- C+L3_6,7- C+L4- C+L4-      Program 3 Left Brain         Right brain       Initial Final Initial Final     Inactive Inactive Inactive Inactive   Amplitude /mA) 3.5 [0-3.5] 3.5 [0-3.5] 0.1 [0-4.9] 3.0 [0-4.9]   Pulse width (usec) 60 60 60 60   Freq (Hz) 130 130 130 130   Contacts: C+L3_5- C+L3_5- C+L4- C+L4-           Assessment/Plan:  Arley Butt is a 73 year old male with ET s/p bilateral VIM DBS who returns for follow-up.  Today I increased the right VIM current which led to tremor improvement.  Cognitive changes are improving but not back to baseline.    - Continue gabapentin as is  - RVIM increased from 0.1 to 3.0  - RTC 6 months, 1 hr DBS programming      Kaylynn Plummer MD   of Neurology  Movement Disorders Division       18 minutes spent on the date of the  encounter doing chart review, history and exam, documentation and further activities as noted above.     Additional time spent for separate DBS programming: 10 min DBS analyzed with reprogramming.

## 2024-03-21 ENCOUNTER — TELEPHONE (OUTPATIENT)
Dept: NEUROLOGY | Facility: CLINIC | Age: 74
End: 2024-03-21

## 2024-03-21 NOTE — TELEPHONE ENCOUNTER
Svitlana, patient's daughter, left the writer a detailed message the VA is requesting for a request for service form signed by Dr. Plummer, to be sent to the VA for any further coverage for future appointments.    A message was to the [facility]-Authorized Rerferral, to start on this request.

## 2024-03-21 NOTE — TELEPHONE ENCOUNTER
Svitlana, patient's daughter, called and left the writer a detailed message stating she would like a call back from the writer on getting her help with something.    Called Svitlana back and stated she would like to know what she should do. Svitlana stated the VA told her the VA authorization had  and the visit for 24 would not be covered. Svitlana stated the VA authorization that was attached to the 24 visit was for the MRI the patient had recently and not for a Neurology referral. Informed Svitlana to contact [facility] billing because writer is not able to attach a VA referral to the appointments and that is done through billing. Svitlana was given the billing phone number and thanked the writer for her time. Svitlana stated she was told by the VA that it is her responsibility to make sure the referral does not  and that a new request is made prior to an appointment.     Will make a note to check the VA authorization is up-to-date each time an appointment is scheduled.

## 2024-05-02 ENCOUNTER — TELEPHONE (OUTPATIENT)
Dept: NEUROLOGY | Facility: CLINIC | Age: 74
End: 2024-05-02

## 2024-05-02 NOTE — TELEPHONE ENCOUNTER
Lynn from the VA left a detailed message stating they need a request for service form faxed to them along with documentation to the VA - fax is [phone], for further Neurology care at [facility]. Caller stated the patient's daughter called them and they need this form sent to them. Caller stated the patient's identifier is [id number].    If there are questions for them, please call their call center at [phone].

## 2024-05-28 NOTE — TELEPHONE ENCOUNTER
Svitlana, patient's daughter, stated she has requested for a VA authorization to be done by the VA and the VA informed her they need a request for service form and Dr. Plummer's last office notes, faxed to them. Informed Svitlana that the writer did let the correct person/department know of this and is unsure if that the request for service and the last office note was faxed to VA. Informed Svitlana that it might be too early for the request to be sent to the VA since the appointment is not until July. Informed the Svitlana the writer will notify the  of her request again.

## 2024-06-06 ENCOUNTER — TELEPHONE (OUTPATIENT)
Dept: NEUROLOGY | Facility: CLINIC | Age: 74
End: 2024-06-06

## 2024-06-06 NOTE — TELEPHONE ENCOUNTER
Informed Svitlana Plummer is not in on 7/22/24 and the patient's appointment will need to be rescheduled. The patient was rescheduled to 7/23/24 at 9 AM with Dr. Plummer.

## 2024-06-11 ENCOUNTER — HOSPITAL ENCOUNTER (OUTPATIENT)
Dept: MRI IMAGING | Facility: CLINIC | Age: 74
Discharge: HOME | End: 2024-06-11
Attending: PHYSICIAN ASSISTANT | Admitting: PHYSICIAN ASSISTANT
Payer: COMMERCIAL

## 2024-06-11 DIAGNOSIS — M25.511 RIGHT SHOULDER PAIN: ICD-10-CM

## 2024-06-11 DIAGNOSIS — Z01.89 ENCOUNTER FOR OTHER SPECIFIED SPECIAL EXAMINATIONS: ICD-10-CM

## 2024-06-11 PROCEDURE — 73221 MRI JOINT UPR EXTREM W/O DYE: CPT | Mod: 26 | Performed by: RADIOLOGY

## 2024-06-11 PROCEDURE — 73221 MRI JOINT UPR EXTREM W/O DYE: CPT | Mod: RT

## 2024-06-14 ENCOUNTER — TRANSFERRED RECORDS (OUTPATIENT)
Dept: HEALTH INFORMATION MANAGEMENT | Facility: CLINIC | Age: 74
End: 2024-06-14

## 2024-06-17 ENCOUNTER — TRANSCRIBE ORDERS (OUTPATIENT)
Dept: OTHER | Age: 74
End: 2024-06-17

## 2024-06-17 DIAGNOSIS — G25.0 ESSENTIAL TREMOR: Primary | ICD-10-CM

## 2024-06-27 ENCOUNTER — TELEPHONE (OUTPATIENT)
Dept: NEUROLOGY | Facility: CLINIC | Age: 74
End: 2024-06-27

## 2024-06-27 NOTE — TELEPHONE ENCOUNTER
Svitlana, patient's daughter, called to verify that the patient still had an appointment with Dr. Plummer on 7/23/24 at 9 AM because someone called her and left her a detailed message that the patient's 7/22 appointment needed to be canceled. Informed Svitlana that the patient is still scheduled on 7/23/24 at 9 AM with Dr. Plummer.    Svitlana asked if the clinic could send in a request for service form. Informed Svitlana the writer will notify the correct department. Svitlana had no further questions at this time.

## 2024-06-27 NOTE — TELEPHONE ENCOUNTER
Spoke to Svitlana and informed her per Authorized referral team member, that [facility]. Svitlana thanked the writer for the update and stated she will wait for the letter to come in the mail from the [facility] then.

## 2024-07-23 ENCOUNTER — OFFICE VISIT (OUTPATIENT)
Dept: NEUROLOGY | Facility: CLINIC | Age: 74
End: 2024-07-23
Payer: COMMERCIAL

## 2024-07-23 VITALS
DIASTOLIC BLOOD PRESSURE: 82 MMHG | SYSTOLIC BLOOD PRESSURE: 130 MMHG | WEIGHT: 207.1 LBS | HEART RATE: 66 BPM | OXYGEN SATURATION: 96 % | BODY MASS INDEX: 34.05 KG/M2

## 2024-07-23 DIAGNOSIS — Z96.89 S/P DEEP BRAIN STIMULATOR PLACEMENT: ICD-10-CM

## 2024-07-23 DIAGNOSIS — G25.0 ESSENTIAL TREMOR: Primary | ICD-10-CM

## 2024-07-23 PROCEDURE — 99212 OFFICE O/P EST SF 10 MIN: CPT | Mod: 25 | Performed by: STUDENT IN AN ORGANIZED HEALTH CARE EDUCATION/TRAINING PROGRAM

## 2024-07-23 PROCEDURE — 95970 ALYS NPGT W/O PRGRMG: CPT | Performed by: STUDENT IN AN ORGANIZED HEALTH CARE EDUCATION/TRAINING PROGRAM

## 2024-07-23 RX ORDER — VENLAFAXINE HYDROCHLORIDE 37.5 MG/1
37.5 CAPSULE, EXTENDED RELEASE ORAL
COMMUNITY
Start: 2024-06-25

## 2024-07-23 RX ORDER — VENLAFAXINE HYDROCHLORIDE 150 MG/1
150 CAPSULE, EXTENDED RELEASE ORAL
COMMUNITY
Start: 2024-05-21

## 2024-07-23 ASSESSMENT — ACTIVITIES OF DAILY LIVING (ADL)
FEEDING_WITH_A_SPOON: (0) NORMAL
WORKING: (0) NORMAL
CARRYING_FOOD_TRAYS_PLATES_OR_SIMILAR_ITEMS: (0) NORMAL
DRINKING_FROM_A_GLASS: (0) NORMAL
OVERALL_DISABILITY_WITH_THE_MOST_AFFECTED_TASK: (0) NORMAL
HYGIENE: (0) NORMAL
HYGIENE: (0) NORMAL
CARRYING_FOOD_TRAYS,_PLATES_OR_SIMILAR_ITEMS: (0) NORMAL
FEEDING_WITH_A_SPOON: (0) NORMAL
DRESS: (0) NORMAL
WRITING: (0) NORMAL
SOCIAL_IMPACT: (0) NORMAL
POURING: (0) NORMAL
ADL_TOTAL: 0
USING_KEYS: (0) NORMAL
SOCIAL_IMPACT: (0) NORMAL
SPEAKING: (0) NORMAL
DRESS: (0) NORMAL

## 2024-07-23 ASSESSMENT — PAIN SCALES - GENERAL: PAINLEVEL: NO PAIN (0)

## 2024-07-23 NOTE — LETTER
2024       RE: Arley Butt  [address] [facility] [address]     Dear Colleague,    Thank you for referring your patient, Arley Butt, to the [facility] [place] at [facility]. Please see a copy of my visit note below.    Department of Neurology  Movement Disorders Division   DBS Follow-up Note    Patient: Arley Butt  MRN: [id number]   : 1950   Date of Visit: 2024    Diagnosis: Essential tremor  DBS Target(s): Bilateral VIM  Date(s) of DBS Lead Placement: L 2022, R 2023  Date(s) of IPG Placement: L chest 10/4/2022  Device: [employer]      Chief Complaint:  Arley Butt is a 73 year old male who returns to clinic for follow up of ET status post bilateral VIM DBS.  He was last seen 2024 at which time RVIM was increased.    Interval History:  Tremors are well controlled.  There are no activities that are causing tremor.    Sometimes catches his left foot while walking.  Has been present for 1 year.  Hasn't coincided with DBS changes.  Recently diagnosed with neuropathy.    Did a 's assessment.  [facility].       Tremor ADL Scale  1. Speaking 0 (0) Normal   2. Feeding with a spoon 0 (0) Normal   3. Drinking from a glass 0 (0) Normal   4. Hygiene 0 (0) Normal   5. Dressing 0 (0) Normal   6. Pouring 0 (0) Normal   7. Carrying food trays, plates or similar items 0 (0) Normal   8. Using keys 0 (0) Normal   9. Writing 0 (0) Normal   10. Working 0 (0) Normal   11. Overall disability with the most affected task 0 (0) Normal   Name of most affected task       12. Social impact 0 (0) Normal   ADL TOTAL 0        Relevant Medications AM Bedtime   Gabapentin 300mg 1 2   Last taken: this morning      Medications:  Current Outpatient Medications   Medication Sig Dispense Refill    acamprosate (CAMPRAL) 333 MG EC tablet Take 333 mg by mouth 3 times daily       acetaminophen (TYLENOL) 325 MG tablet Take 2 tablets (650 mg) by mouth every 4 hours as needed for other (For optimal non-opioid multimodal pain management to improve pain control.) 30 tablet 0    cholecalciferol 50 MCG (2000 UT) tablet TAKE ONE TABLET BY MOUTH DAILY FOR VITAMIN D SUPPLEMENT      cyproheptadine (PERIACTIN) 4 MG tablet Take 12 mg by mouth At Bedtime      diclofenac (VOLTAREN) 1 % topical gel Apply 2 g topically 4 times daily      docusate sodium (DSS) 100 MG capsule Take 100 mg by mouth 2 times daily as needed for constipation      DULoxetine (CYMBALTA) 60 MG capsule Take 60 mg by mouth daily Pt will start 11/1/2023 if ok by       finasteride (PROSCAR) 5 MG tablet Take 1 tablet by mouth daily      folic acid (FOLVITE) 1 MG tablet Take 1 tablet by mouth daily      gabapentin (NEURONTIN) 300 MG capsule Dosing as follows:    AM: 300 mg   Bedtime: 600 mg (300 mg capsule x2)      lidocaine (XYLOCAINE) 5 % external ointment Apply topically 2 times daily      melatonin 3 MG tablet Take 3 mg by mouth nightly as needed for sleep      mirtazapine (REMERON) 30 MG tablet Take 15 mg by mouth at bedtime      Multiple Vitamins-Minerals (MULTIVITAMIN-MINERALS PO) Take 1 tablet by mouth daily      NONFORMULARY Nocturnal CPAP with 2L oxygen      ondansetron (ZOFRAN ODT) 4 MG ODT tab Take 1 tablet (4 mg) by mouth every 6 hours as needed for nausea or vomiting (Patient taking differently: Take 8 mg by mouth 3 times daily as needed for nausea or vomiting) 10 tablet 0    pantoprazole (PROTONIX) 40 MG EC tablet Take 40 mg by mouth 2 times daily      polyethylene glycol (MIRALAX) 17 g packet Take 17 g by mouth daily 30 packet 0    prazosin (MINIPRESS) 5 MG capsule Take 10 mg by mouth At Bedtime      propylene glycol (SYSTANE BALANCE) 0.6 % SOLN ophthalmic solution Apply 1 drop to eye 4 times daily as needed for dry eyes      senna (SENOKOT) 8.6 MG tablet Take 8.6 mg by mouth daily as needed for constipation       simvastatin (ZOCOR) 20 MG tablet Take 20 mg by mouth At Bedtime      tamsulosin (FLOMAX) 0.4 MG capsule Take 1 capsule by mouth daily      triamcinolone (KENALOG) 0.1 % external cream Apply topically 2 times daily as needed      venlafaxine (EFFEXOR XR) 150 MG 24 hr capsule Take 150 mg by mouth      venlafaxine (EFFEXOR XR) 37.5 MG 24 hr capsule Take 37.5 mg by mouth      Carboxymethylcellulose Sodium 1 % GEL Apply 1 drop to eye 4 times daily (Patient not taking: Reported on 1/29/2024)      dexamethasone (DECADRON) 2 MG tablet Take 3 tabs by mouth daily x 3 days, then 2 tabs daily x 3 days, then 1 tab daily x 3 days, then 1/2 tab daily x 3 days. (Patient not taking: Reported on 6/7/2023) 20 tablet 0    diphenhydrAMINE (BENADRYL) 25 MG capsule Take 1-2 capsules (25-50 mg) by mouth nightly as needed for sleep (Patient not taking: Reported on 4/26/2023) 10 capsule 0    diphenhydrAMINE-acetaminophen (TYLENOL PM)  MG tablet Take 1-2 tablets by mouth nightly as needed for sleep (Patient not taking: Reported on 1/29/2024)      oxyCODONE (ROXICODONE) 5 MG tablet Take 1 tablet (5 mg) by mouth every 4 hours as needed for moderate pain (Patient not taking: Reported on 4/26/2023) 10 tablet 0    Pregabalin (LYRICA) 200 MG capsule 200 mg (Patient not taking: Reported on 10/31/2023)      sertraline (ZOLOFT) 50 MG tablet Take 150 mg by mouth every morning Will start to lean off 11/1/2023 to start duloxetine (Patient not taking: Reported on 1/29/2024)      vitamin B-12 (CYANOCOBALAMIN) 1000 MCG tablet Take 1,000 mcg by mouth daily (Patient not taking: Reported on 1/29/2024)          Allergies: has No Known Allergies.    Physical Exam:  The patient's  weight is 93.9 kg (207 lb 1.6 oz). His blood pressure is 130/82 and his pulse is 66. His oxygen saturation is 96%.       Neurological Examination:   Last medication dose: this am  Left leg stride slightly shortened   1/29/2024  10:00 AM 7/23/2024  9:00 AM   Tremor Motor Scale      Assessment Time 10:54  09:11    Medication On  On    DBS - Right Brain On  On    DBS - Left Brain On  On    Head 0  0    Face & Jaw 0  0    Voice 0.5  0.5    Outstretched - RIGHT 0.5  0    Outstretched - LEFT 1  0    Wingbeating - RIGHT 0  0    Wingbeating - LEFT 1  0    Kinetic - RIGHT 0.5  0.5    Kinetic - LEFT 1  0    Lower Limb - RIGHT 0  0    Lower Limb - LEFT 0  0    Lower Limb (Max) 0  0    Spiral - RIGHT 1  0.5    Spiral - LEFT 2  0.5    Handwriting 1.5  1    Dot approx - RIGHT 0.5  1    Dot approx - LEFT 1  0.5    Trunk (Standing) 0  0    Total Right 2.5  2    Total Left 6  1    Axial 0.5  0.5    TOTAL 10.5  4.5          Procedure: DBS Interrogation & Programming  Lead(s):     Left Right   DBS Target VIM VIM   DBS Lead Type       Lead Implant Date 9/26/2022 4/11/2023      IPG(s):    1   IPG Genus R16   IPG Implant Date 10/4/2022   Location Left chest   Battery (V) /      Impedance Check: No problems found.  See scanned report for impedance details.     Program 1 Left Brain       Right brain       Initial Final Initial Final     Active Active Active Active   Amplitude (mA) 3.0 [0-3.0] 3.0 [0-3.0] 3.0 [0-4.9] 3.0 [0-4.9]   Pulse width (usec) 60 60 60 60   Freq (Hz) 130 130 130 130   Contacts: C+L3_5, 7- C+L3_5,7- C+L4- C+L4-      Program 2 Left Brain         Right brain       Initial Final Initial Final     Inactive Inactive Inactive Inactive   Amplitude /mA) 2.5 [0-2.5] 2.5 [0-2.5] 0.1 [0-4.9] 3.0 [0-4.9]   Pulse width (usec) 60 60 60 60   Freq (Hz) 130 130 130 130   Contacts: C+L3_6,7- C+L3_6,7- C+L4- C+L4-      Program 3 Left Brain         Right brain       Initial Final Initial Final     Inactive Inactive Inactive Inactive   Amplitude /mA) 3.5 [0-3.5] 3.5 [0-3.5] 0.1 [0-4.9] 3.0 [0-4.9]   Pulse width (usec) 60 60 60 60   Freq (Hz) 130 130 130 130   Contacts: C+L3_5- C+L3_5- C+L4- C+L4-         Assessment/Plan:  Arley BUSTAMANTE Daquan is a 73 year old male with ET s/p bilateral VIM DBS who returns for follow-up.   His tremor is well controlled.  I confirmed that his gait changes do not improve when DBS is turned off.    - No programming changes.  - RTC 6 months, 1hr DBS programming with Dr. Tariq in [place]         16 minutes spent on the date of the encounter doing chart review, history and exam, documentation and further activities as noted above.     Additional time spent for separate DBS programmin min DBS analyzed without reprogramming.          Again, thank you for allowing me to participate in the care of your patient.      Sincerely,    Kaylynn Plummer MD

## 2024-07-23 NOTE — PROGRESS NOTES
Department of Neurology  Movement Disorders Division   DBS Follow-up Note    Patient: Arley Butt  MRN: [id number]   : 1950   Date of Visit: 2024    Diagnosis: Essential tremor  DBS Target(s): Bilateral VIM  Date(s) of DBS Lead Placement: L 2022, R 2023  Date(s) of IPG Placement: L chest 10/4/2022  Device: [employer]      Chief Complaint:  Arley Butt is a 73 year old male who returns to clinic for follow up of ET status post bilateral VIM DBS.  He was last seen 2024 at which time RVIM was increased.    Interval History:  Tremors are well controlled.  There are no activities that are causing tremor.    Sometimes catches his left foot while walking.  Has been present for 1 year.  Hasn't coincided with DBS changes.  Recently diagnosed with neuropathy.    Did a 's assessment.  [facility] and found to [facility] dysfunction.       Tremor ADL Scale  1. Speaking 0 (0) Normal   2. Feeding with a spoon 0 (0) Normal   3. Drinking from a glass 0 (0) Normal   4. Hygiene 0 (0) Normal   5. Dressing 0 (0) Normal   6. Pouring 0 (0) Normal   7. Carrying food trays, plates or similar items 0 (0) Normal   8. Using keys 0 (0) Normal   9. Writing 0 (0) Normal   10. Working 0 (0) Normal   11. Overall disability with the most affected task 0 (0) Normal   Name of most affected task       12. Social impact 0 (0) Normal   ADL TOTAL 0        Relevant Medications AM Bedtime   Gabapentin 300mg 1 2   Last taken: this morning      Medications:  Current Outpatient Medications   Medication Sig Dispense Refill    acamprosate (CAMPRAL) 333 MG EC tablet Take 333 mg by mouth 3 times daily      acetaminophen (TYLENOL) 325 MG tablet Take 2 tablets (650 mg) by mouth every 4 hours as needed for other (For optimal non-opioid multimodal pain management to improve pain control.) 30 tablet 0    cholecalciferol 50 MCG (2000 UT) tablet TAKE ONE TABLET BY MOUTH DAILY FOR VITAMIN D SUPPLEMENT       cyproheptadine (PERIACTIN) 4 MG tablet Take 12 mg by mouth At Bedtime      diclofenac (VOLTAREN) 1 % topical gel Apply 2 g topically 4 times daily      docusate sodium (DSS) 100 MG capsule Take 100 mg by mouth 2 times daily as needed for constipation      DULoxetine (CYMBALTA) 60 MG capsule Take 60 mg by mouth daily Pt will start 11/1/2023 if ok by       finasteride (PROSCAR) 5 MG tablet Take 1 tablet by mouth daily      folic acid (FOLVITE) 1 MG tablet Take 1 tablet by mouth daily      gabapentin (NEURONTIN) 300 MG capsule Dosing as follows:    AM: 300 mg   Bedtime: 600 mg (300 mg capsule x2)      lidocaine (XYLOCAINE) 5 % external ointment Apply topically 2 times daily      melatonin 3 MG tablet Take 3 mg by mouth nightly as needed for sleep      mirtazapine (REMERON) 30 MG tablet Take 15 mg by mouth at bedtime      Multiple Vitamins-Minerals (MULTIVITAMIN-MINERALS PO) Take 1 tablet by mouth daily      NONFORMULARY Nocturnal CPAP with 2L oxygen      ondansetron (ZOFRAN ODT) 4 MG ODT tab Take 1 tablet (4 mg) by mouth every 6 hours as needed for nausea or vomiting (Patient taking differently: Take 8 mg by mouth 3 times daily as needed for nausea or vomiting) 10 tablet 0    pantoprazole (PROTONIX) 40 MG EC tablet Take 40 mg by mouth 2 times daily      polyethylene glycol (MIRALAX) 17 g packet Take 17 g by mouth daily 30 packet 0    prazosin (MINIPRESS) 5 MG capsule Take 10 mg by mouth At Bedtime      propylene glycol (SYSTANE BALANCE) 0.6 % SOLN ophthalmic solution Apply 1 drop to eye 4 times daily as needed for dry eyes      senna (SENOKOT) 8.6 MG tablet Take 8.6 mg by mouth daily as needed for constipation      simvastatin (ZOCOR) 20 MG tablet Take 20 mg by mouth At Bedtime      tamsulosin (FLOMAX) 0.4 MG capsule Take 1 capsule by mouth daily      triamcinolone (KENALOG) 0.1 % external cream Apply topically 2 times daily as needed      venlafaxine (EFFEXOR XR) 150 MG 24 hr capsule Take 150 mg by mouth       venlafaxine (EFFEXOR XR) 37.5 MG 24 hr capsule Take 37.5 mg by mouth      Carboxymethylcellulose Sodium 1 % GEL Apply 1 drop to eye 4 times daily (Patient not taking: Reported on 1/29/2024)      dexamethasone (DECADRON) 2 MG tablet Take 3 tabs by mouth daily x 3 days, then 2 tabs daily x 3 days, then 1 tab daily x 3 days, then 1/2 tab daily x 3 days. (Patient not taking: Reported on 6/7/2023) 20 tablet 0    diphenhydrAMINE (BENADRYL) 25 MG capsule Take 1-2 capsules (25-50 mg) by mouth nightly as needed for sleep (Patient not taking: Reported on 4/26/2023) 10 capsule 0    diphenhydrAMINE-acetaminophen (TYLENOL PM)  MG tablet Take 1-2 tablets by mouth nightly as needed for sleep (Patient not taking: Reported on 1/29/2024)      oxyCODONE (ROXICODONE) 5 MG tablet Take 1 tablet (5 mg) by mouth every 4 hours as needed for moderate pain (Patient not taking: Reported on 4/26/2023) 10 tablet 0    Pregabalin (LYRICA) 200 MG capsule 200 mg (Patient not taking: Reported on 10/31/2023)      sertraline (ZOLOFT) 50 MG tablet Take 150 mg by mouth every morning Will start to lean off 11/1/2023 to start duloxetine (Patient not taking: Reported on 1/29/2024)      vitamin B-12 (CYANOCOBALAMIN) 1000 MCG tablet Take 1,000 mcg by mouth daily (Patient not taking: Reported on 1/29/2024)          Allergies: has No Known Allergies.    Physical Exam:  The patient's  weight is 93.9 kg (207 lb 1.6 oz). His blood pressure is 130/82 and his pulse is 66. His oxygen saturation is 96%.       Neurological Examination:   Last medication dose: this am  Left leg stride slightly shortened   1/29/2024  10:00 AM 7/23/2024  9:00 AM   Tremor Motor Scale     Assessment Time 10:54  09:11    Medication On  On    DBS - Right Brain On  On    DBS - Left Brain On  On    Head 0  0    Face & Jaw 0  0    Voice 0.5  0.5    Outstretched - RIGHT 0.5  0    Outstretched - LEFT 1  0    Wingbeating - RIGHT 0  0    Wingbeating - LEFT 1  0    Kinetic - RIGHT 0.5  0.5     Kinetic - LEFT 1  0    Lower Limb - RIGHT 0  0    Lower Limb - LEFT 0  0    Lower Limb (Max) 0  0    Spiral - RIGHT 1  0.5    Spiral - LEFT 2  0.5    Handwriting 1.5  1    Dot approx - RIGHT 0.5  1    Dot approx - LEFT 1  0.5    Trunk (Standing) 0  0    Total Right 2.5  2    Total Left 6  1    Axial 0.5  0.5    TOTAL 10.5  4.5          Procedure: DBS Interrogation & Programming  Lead(s):     Left Right   DBS Target VIM VIM   DBS Lead Type       Lead Implant Date 9/26/2022 4/11/2023      IPG(s):    1   IPG Genus R16   IPG Implant Date 10/4/2022   Location Left chest   Battery (V) /      Impedance Check: No problems found.  See scanned report for impedance details.     Program 1 Left Brain       Right brain       Initial Final Initial Final     Active Active Active Active   Amplitude (mA) 3.0 [0-3.0] 3.0 [0-3.0] 3.0 [0-4.9] 3.0 [0-4.9]   Pulse width (usec) 60 60 60 60   Freq (Hz) 130 130 130 130   Contacts: C+L3_5, 7- C+L3_5,7- C+L4- C+L4-      Program 2 Left Brain         Right brain       Initial Final Initial Final     Inactive Inactive Inactive Inactive   Amplitude /mA) 2.5 [0-2.5] 2.5 [0-2.5] 0.1 [0-4.9] 3.0 [0-4.9]   Pulse width (usec) 60 60 60 60   Freq (Hz) 130 130 130 130   Contacts: C+L3_6,7- C+L3_6,7- C+L4- C+L4-      Program 3 Left Brain         Right brain       Initial Final Initial Final     Inactive Inactive Inactive Inactive   Amplitude /mA) 3.5 [0-3.5] 3.5 [0-3.5] 0.1 [0-4.9] 3.0 [0-4.9]   Pulse width (usec) 60 60 60 60   Freq (Hz) 130 130 130 130   Contacts: C+L3_5- C+L3_5- C+L4- C+L4-         Assessment/Plan:  Arley Butt is a 73 year old male with ET s/p bilateral VIM DBS who returns for follow-up.  His tremor is well controlled.  I confirmed that his gait changes do not improve when DBS is turned off.    - No programming changes.  - RTC 6 months, 1hr DBS programming with Dr. Tariq in [place]      Kaylynn Plummer MD   of Neurology  Movement Disorders Division       16  minutes spent on the date of the encounter doing chart review, history and exam, documentation and further activities as noted above.     Additional time spent for separate DBS programmin min DBS analyzed without reprogramming.

## 2024-07-28 ENCOUNTER — HEALTH MAINTENANCE LETTER (OUTPATIENT)
Age: 74
End: 2024-07-28

## 2024-10-03 ENCOUNTER — TELEPHONE (OUTPATIENT)
Dept: NEUROLOGY | Facility: CLINIC | Age: 74
End: 2024-10-03

## 2024-10-03 NOTE — Clinical Note
Ismael Burger,  I noticed that you had scheduled this patient for his follow up appt with Dr. Tariq. For patients with [facility] insurance, please make sure the appointment is attached to the appointment otherwise our billing department will bill the patient and not the [facility]. Let me know if you have any questions on this. I had gone to attach the patient's VA insurance to his 1/30/25 with Dr. Tariq. If you're ever not sure, you can always send me a message to call the patient to schedule the programming visits.  Thanks, Yusuf

## 2024-10-03 NOTE — TELEPHONE ENCOUNTER
Svitlana called to verify that patient is scheduled for his 25 appointment with Dr. Tariq in [place] and not in [place]. Svitlana stated the writer was the one that informed her the appointment would be in [place]. Informed Svitlana that the writer did not schedule this appointment and that it was scheduled when they were in the clinic by rooming staff. Informed Svitlana of Dr. Tariq's schedule and that she does not go to [place] at all. Svitlana asked if there was a current [facility] authorization on file. Informed Svitlana that [facility] does not have the most of to date one and that last VA authorization was scanned in on 24 and  in 2024. Svitlana asked if the authorization would be requested at this time. Informed Svitlana that the request for service would be done by [facility]'s authorized referral department and the patient's appointment is too far out.     Informed Svitlana that [facility]'s authorized referral department usually sends out a request for service form 30 days prior if they see the authorization will  prior to the patient's appointment. Also informed Svitlana that it is also on the patient's or in this case Svitlana if she is helping the patient with his care, to make sure the [facility] authorization is current prior to his appointment per the authorized referral department's request from previous conversations the writer has had with them. Svitlana verbalized her understanding of this and had no further questions.

## 2024-11-06 ENCOUNTER — TRANSFERRED RECORDS (OUTPATIENT)
Dept: HEALTH INFORMATION MANAGEMENT | Facility: CLINIC | Age: 74
End: 2024-11-06

## 2024-11-06 ENCOUNTER — TRANSCRIBE ORDERS (OUTPATIENT)
Dept: OTHER | Age: 74
End: 2024-11-06

## 2024-11-06 DIAGNOSIS — R25.1 TREMOR: Primary | ICD-10-CM

## 2024-12-12 ENCOUNTER — TELEPHONE (OUTPATIENT)
Dept: NEUROLOGY | Facility: CLINIC | Age: 74
End: 2024-12-12

## 2024-12-12 NOTE — TELEPHONE ENCOUNTER
Spoke to Svitlana and informed her the visit on 1/3/25 will need to be rescheduled since Dr. Tariq will not be in clinic. Svitlana stated Dr. Plummer had recommended Dr. Tariq but did want to schedule too far out. Informed Svitlana the next available is on 6/5/25. Svitlana asked what could be done because June was too far out. Informed Svitlana that they could schedule with an interim provider and then also schedule with Dr. Tariq to establish care. Svitlana stated that would be perfect.    The patient was rescheduled to see Dr. Ferguson on 1/24/25 at 8 AM and then scheduled with Dr. Tariq on 6/5/25 at 10 AM to establish care. Svitlana had no further questions at this time.

## 2024-12-12 NOTE — TELEPHONE ENCOUNTER
----- Message from Kamilah BUSTAMANTE sent at 12/12/2024 11:32 AM CST -----  Good Morning,    Please forgive me if I have sent this pt to you already. This pt is scheduled [place] DBS on 1/30/25 with Dr. Tariq and needs a reschedule.    Please and Thank you

## 2025-03-10 ENCOUNTER — TRANSCRIBE ORDERS (OUTPATIENT)
Dept: OTHER | Age: 75
End: 2025-03-10

## 2025-03-10 DIAGNOSIS — G25.0 ESSENTIAL TREMOR: Primary | ICD-10-CM

## 2025-04-16 ENCOUNTER — HOSPITAL ENCOUNTER (OUTPATIENT)
Dept: MRI IMAGING | Facility: CLINIC | Age: 75
Discharge: HOME | End: 2025-04-16
Attending: NURSE PRACTITIONER
Payer: COMMERCIAL

## 2025-04-16 DIAGNOSIS — Z01.89 ENCOUNTER FOR OTHER SPECIFIED SPECIAL EXAMINATIONS: ICD-10-CM

## 2025-04-16 PROCEDURE — 72148 MRI LUMBAR SPINE W/O DYE: CPT | Mod: 26 | Performed by: RADIOLOGY

## 2025-04-16 PROCEDURE — 72148 MRI LUMBAR SPINE W/O DYE: CPT

## 2025-04-24 ENCOUNTER — TELEPHONE (OUTPATIENT)
Dept: NEUROLOGY | Facility: CLINIC | Age: 75
End: 2025-04-24

## 2025-04-24 NOTE — TELEPHONE ENCOUNTER
Svitlana called and stated she will not be available to take the patient to his appointment with Dr. Tariq on 6/5/25 and will need to reschedule this. Offered 7/31/25 at 10 AM but Svitlana declined. Svitlana asked if anyone can see the patient in June. Offered 6/20/25 at 8 AM with Dr. Ferguson. Svitlana stated they would like this appointment and will plan to establish care with Dr. Ferguson since they have seen him in the past. The patient was rescheduled to this date and time with Dr. Ferguson.

## 2025-06-20 ENCOUNTER — OFFICE VISIT (OUTPATIENT)
Dept: NEUROLOGY | Facility: CLINIC | Age: 75
End: 2025-06-20
Attending: NURSE PRACTITIONER
Payer: COMMERCIAL

## 2025-06-20 VITALS
BODY MASS INDEX: 33.84 KG/M2 | WEIGHT: 205.8 LBS | SYSTOLIC BLOOD PRESSURE: 124 MMHG | DIASTOLIC BLOOD PRESSURE: 75 MMHG | HEART RATE: 73 BPM | OXYGEN SATURATION: 92 %

## 2025-06-20 DIAGNOSIS — G25.0 ESSENTIAL TREMOR: ICD-10-CM

## 2025-06-20 DIAGNOSIS — R25.1 TREMOR: ICD-10-CM

## 2025-06-20 DIAGNOSIS — Z96.89 S/P DEEP BRAIN STIMULATOR PLACEMENT: Primary | ICD-10-CM

## 2025-06-20 PROCEDURE — 1126F AMNT PAIN NOTED NONE PRSNT: CPT | Performed by: PSYCHIATRY & NEUROLOGY

## 2025-06-20 PROCEDURE — 99215 OFFICE O/P EST HI 40 MIN: CPT | Mod: GC | Performed by: PSYCHIATRY & NEUROLOGY

## 2025-06-20 PROCEDURE — 95970 ALYS NPGT W/O PRGRMG: CPT | Performed by: PSYCHIATRY & NEUROLOGY

## 2025-06-20 PROCEDURE — 3074F SYST BP LT 130 MM HG: CPT | Performed by: PSYCHIATRY & NEUROLOGY

## 2025-06-20 PROCEDURE — 3078F DIAST BP <80 MM HG: CPT | Performed by: PSYCHIATRY & NEUROLOGY

## 2025-06-20 RX ORDER — BISACODYL 10 MG
SUPPOSITORY, RECTAL RECTAL DAILY PRN
COMMUNITY
Start: 2025-01-28

## 2025-06-20 RX ORDER — CARBOXYMETHYLCELLULOSE SODIUM 10 MG/ML
1 GEL OPHTHALMIC 3 TIMES DAILY
COMMUNITY

## 2025-06-20 RX ORDER — AMOXICILLIN 250 MG
2 CAPSULE ORAL DAILY PRN
COMMUNITY
Start: 2025-05-16

## 2025-06-20 ASSESSMENT — ACTIVITIES OF DAILY LIVING (ADL)
TOTAL_SCORE: 0
OVERALL_DISABILITY_WITH_THE_MOST_AFFECTED_TASK: (0) NORMAL
HYGIENE: (0) NORMAL
CARRYING_FOOD_TRAYS_PLATES_OR_SIMILAR_ITEMS: (0) NORMAL
FEEDING_WITH_A_SPOON: (0) NORMAL
DRINKING_FROM_A_GLASS: (0) NORMAL
SPEAKING: (0) NORMAL
WORKING: (0) NORMAL
WRITING: (0) NORMAL
SOCIAL_IMPACT: (0) NORMAL
POURING: (0) NORMAL
USING_KEYS: (0) NORMAL
DRESS: (0) NORMAL

## 2025-06-20 ASSESSMENT — PAIN SCALES - GENERAL: PAINLEVEL_OUTOF10: NO PAIN (0)

## 2025-06-24 ENCOUNTER — TELEPHONE (OUTPATIENT)
Dept: NEUROLOGY | Facility: CLINIC | Age: 75
End: 2025-06-24

## 2025-06-24 NOTE — TELEPHONE ENCOUNTER
----- Message from Manuel Brown sent at 6/20/2025  9:23 AM CDT -----  Regarding: Scheduling follow-up for this patient  Hi Yusuf,    This patient of Dr. Sandy is hoping to be seen in [place] as it is a significantly shorter visit, preferably on a Tuesday morning. Would you be able to help us have them follow-up for a 1 hr DBS there with Dr. Alonso instead?    Best,  Freddy

## 2025-06-24 NOTE — ANESTHESIA POSTPROCEDURE EVALUATION
Patient: Arley Butt    Procedure: Procedure(s):  stealth assisted Left side deep brain stimulator placement, phase I, placement of left side deep brain stimulator electrode, target left ventral intermediate nucleus of the thalamus, with microelectrode recording       Anesthesia Type:  MAC    Note:  Disposition: Inpatient   Postop Pain Control: Uneventful            Sign Out: Well controlled pain   PONV: No   Neuro/Psych: Uneventful            Sign Out: Acceptable/Baseline neuro status   Airway/Respiratory: Uneventful            Sign Out: Acceptable/Baseline resp. status   CV/Hemodynamics: Uneventful            Sign Out: Acceptable CV status; No obvious hypovolemia; No obvious fluid overload   Other NRE: NONE   DID A NON-ROUTINE EVENT OCCUR? No           Last vitals:  Vitals Value Taken Time   /81 09/26/22 1500   Temp     Pulse 57 09/26/22 1512   Resp     SpO2 94 % 09/26/22 1512   Vitals shown include unvalidated device data.    Electronically Signed By: Med Jovel MD  September 26, 2022  3:14 PM   Quality 485: Psoriasis - Improvement In Patient-Reported Itch Severity: Itch severity assessment score is reduced by 3 or more points from the initial (index) assessment score to the follow-up visit score Detail Level: Detailed Quality 226: Preventive Care And Screening: Tobacco Use: Screening And Cessation Intervention: Patient screened for tobacco use and is an ex/non-smoker Quality 431: Preventive Care And Screening: Unhealthy Alcohol Use - Screening: Patient not identified as an unhealthy alcohol user when screened for unhealthy alcohol use using a systematic screening method

## 2025-06-24 NOTE — TELEPHONE ENCOUNTER
Spoke to Svitlana and she stated they do not know what they would like to do. Svitlana stated they wanted to stick with Dr. Ferguson but was not sure since he only had clinic on Fridays. Svitlana asked if the writer could weigh in. Informed Svitlana that this decision was entirely theirs. Svitlana was informed Dr. Alonso does have more availability at 2 locations. Svitlana was also informed Dr. Ferguson is known to the patient and is aware of his case since he's been in surgeries. Svitlana stated they will just stick with Dr. Ferguson. The patient was scheduled with Dr. Ferguson on 1/9/26 at 10 AM. Svitlana thanked the writer for her time and had no further questions.

## 2025-08-10 ENCOUNTER — HEALTH MAINTENANCE LETTER (OUTPATIENT)
Age: 75
End: 2025-08-10

## (undated) DEVICE — HEADRING POINTS STEREOTACTIC DHRSL

## (undated) DEVICE — PREP POVIDONE-IODINE 7.5% SCRUB 4OZ BOTTLE MDS093945

## (undated) DEVICE — DRAPE MAYO STAND 23X54 8337

## (undated) DEVICE — LINEN TOWEL PACK X6 WHITE 5487

## (undated) DEVICE — SYR 10ML FINGER CONTROL W/O NDL 309695

## (undated) DEVICE — PREP SKIN SCRUB TRAY 4461A

## (undated) DEVICE — BUR STRK CARBIDE ROUND 5.0MM 5820-110-050C

## (undated) DEVICE — SPONGE COTTONOID 1/2X1/2" 20-04S

## (undated) DEVICE — ESU HOLSTER PLASTIC DISP E2400

## (undated) DEVICE — PREP POVIDONE IODINE SOLUTION 10% 4OZ BOTTLE 29906-004

## (undated) DEVICE — SOL WATER IRRIG 1000ML BOTTLE 2F7114

## (undated) DEVICE — CABLE 5 CHANNEL INPUT  ALPHA OMEGA SYSTEM STR-000642-55

## (undated) DEVICE — JELLY LUBRICATING SURGILUBE 2OZ TUBE

## (undated) DEVICE — PREP CHLORAPREP CLEAR 3ML 930400

## (undated) DEVICE — NDL 25GA 2"  8881200441

## (undated) DEVICE — PAD CHUX UNDERPAD 23X24" 7136

## (undated) DEVICE — NDL BLUNT 18GA 1" W/O FILTER 305181

## (undated) DEVICE — DRSG TELFA 3X8" 1238

## (undated) DEVICE — BLADE CLIPPER SGL USE 9680

## (undated) DEVICE — DRAPE IOBAN ISOLATION VERTICAL 320X21CM 6617

## (undated) DEVICE — STRAP UNIVERSAL POSITIONING 2-PIECE 4X47X76" 91-287

## (undated) DEVICE — ESU GROUND PAD ADULT W/CORD E7507

## (undated) DEVICE — PACK NEURO MINOR UMMC SNE32MNMU4

## (undated) DEVICE — SPONGE SURGIFOAM 01GM POWDER 1978

## (undated) DEVICE — GLOVE PROTEXIS W/NEU-THERA 7.5  2D73TE75

## (undated) DEVICE — PACK SET-UP STD 9102

## (undated) DEVICE — IMP SCR SYN MATRIX LOW PRO 1.5X04MM SELF DRILL 04.503.104.01: Type: IMPLANTABLE DEVICE | Site: CHEST | Status: NON-FUNCTIONAL

## (undated) DEVICE — SUCTION MANIFOLD NEPTUNE 2 SYS 4 PORT 0702-020-000

## (undated) DEVICE — ELEC MICROPHONICS-FREE ALPHA OMEGA STR-009080-00

## (undated) DEVICE — SOL NACL 0.9% IRRIG 1000ML BOTTLE 2F7124

## (undated) DEVICE — GLOVE PROTEXIS MICRO 7.5  2D73PM75

## (undated) DEVICE — CONTROL NEUROSTIMULATOR REMOTE CONTROL DB-5572-1

## (undated) DEVICE — LINEN TOWEL PACK X30 5481

## (undated) DEVICE — SU VICRYL 3-0 SH 8X18" UND J864D

## (undated) DEVICE — Device

## (undated) DEVICE — ESU GROUND PAD ADULT REM W/15' CORD E7507DB

## (undated) DEVICE — DRSG PRIMAPORE 03 1/8X6" 66000318

## (undated) DEVICE — DRAPE U SPLIT 74X120" 29440

## (undated) DEVICE — TUBE GUIDE ALPHA OMEGA SYSTEM STR-007721-00

## (undated) DEVICE — COMB STERILE 7" PLASTIC 14-1200

## (undated) DEVICE — WIPES FOLEY CARE SURESTEP PROVON DFC100

## (undated) DEVICE — SUTURE BOOTS 051003PBX

## (undated) DEVICE — SOL ADH LIQUID BENZOIN SWAB 0.6ML C1544

## (undated) DEVICE — LABEL MEDICATION SYSTEM 3303-P

## (undated) DEVICE — DRSG GAUZE 4X4" TRAY 6939

## (undated) DEVICE — STPL SKIN 35W ROTATING HEAD PRW35

## (undated) DEVICE — SU MONOCRYL 4-0 PS-2 27" UND Y426H

## (undated) DEVICE — IMP PLATE SYN STR DOG BONE LOW PROFILE 2H TI 421.502: Type: IMPLANTABLE DEVICE | Site: CRANIAL | Status: NON-FUNCTIONAL

## (undated) DEVICE — DECANTER VIAL 2006S

## (undated) DEVICE — DRAPE IOBAN INCISE 23X17" 6650EZ

## (undated) DEVICE — DRAPE POUCH INSTRUMENT 1018

## (undated) DEVICE — DRSG STERI STRIP 1/2X4" R1547

## (undated) DEVICE — DRILL TWIST STRK 1.9 X27MM AO END 60-19326

## (undated) DEVICE — PACK GOWN 3/PK DISP XL SBA32GPFCB

## (undated) DEVICE — CATH TRAY FOLEY SURESTEP 16FR W/TMP PRB STLK LATEX A319416AM

## (undated) DEVICE — DRAPE SHEET REV FOLD 3/4 9349

## (undated) DEVICE — DRSG PRIMAPORE 02X3" 7133

## (undated) DEVICE — LINEN TOWEL PACK X5 5464

## (undated) DEVICE — PREP POVIDONE-IODINE 10% SOLUTION 4OZ BOTTLE MDS093944

## (undated) DEVICE — SPONGE SURGIFOAM 100 1974

## (undated) DEVICE — RX BACITRACIN OINTMENT 0.9G 1/32OZ CUR001109

## (undated) DEVICE — DRSG TEGADERM 4X4 3/4" 1626W

## (undated) DEVICE — PREP CHLORAPREP 26ML TINTED HI-LITE ORANGE 930815

## (undated) DEVICE — TUBING SMOKE EVAC 3/8"X10' 0702-045-023

## (undated) DEVICE — BLADE KNIFE SURG 11 371111

## (undated) DEVICE — SU VICRYL 0 CT-1 27" UND J260H

## (undated) DEVICE — DRAPE C-ARM W/STRAPS 42X72" 07-CA104

## (undated) DEVICE — NDL ANGIOCATH 14GA 1.25" 4048

## (undated) DEVICE — GLOVE BIOGEL PI MICRO SZ 7.5 48575

## (undated) DEVICE — SYR BULB IRRIG DOVER 60 ML LATEX FREE 67000

## (undated) DEVICE — OR CABLE

## (undated) DEVICE — SHUNT TUNNELER SHEATH 46CM 901118

## (undated) DEVICE — POSITIONER ARMBOARD FOAM 1PAIR LF FP-ARMB1

## (undated) DEVICE — SU VICRYL 2-0 CT-1 27" UND J259H

## (undated) RX ORDER — FENTANYL CITRATE-0.9 % NACL/PF 10 MCG/ML
PLASTIC BAG, INJECTION (ML) INTRAVENOUS
Status: DISPENSED
Start: 2022-10-04

## (undated) RX ORDER — SODIUM CHLORIDE 9 MG/ML
INJECTION, SOLUTION INTRAVENOUS
Status: DISPENSED
Start: 2023-04-11

## (undated) RX ORDER — HYDROMORPHONE HCL IN WATER/PF 6 MG/30 ML
PATIENT CONTROLLED ANALGESIA SYRINGE INTRAVENOUS
Status: DISPENSED
Start: 2022-09-26

## (undated) RX ORDER — LIDOCAINE HYDROCHLORIDE 20 MG/ML
JELLY TOPICAL
Status: DISPENSED
Start: 2022-09-26

## (undated) RX ORDER — FENTANYL CITRATE 50 UG/ML
INJECTION, SOLUTION INTRAMUSCULAR; INTRAVENOUS
Status: DISPENSED
Start: 2022-09-26

## (undated) RX ORDER — FENTANYL CITRATE-0.9 % NACL/PF 10 MCG/ML
PLASTIC BAG, INJECTION (ML) INTRAVENOUS
Status: DISPENSED
Start: 2022-09-26

## (undated) RX ORDER — ACETAMINOPHEN 325 MG/1
TABLET ORAL
Status: DISPENSED
Start: 2022-09-26

## (undated) RX ORDER — FENTANYL CITRATE 50 UG/ML
INJECTION, SOLUTION INTRAMUSCULAR; INTRAVENOUS
Status: DISPENSED
Start: 2023-04-11

## (undated) RX ORDER — CALCIUM CHLORIDE 100 MG/ML
INJECTION INTRAVENOUS; INTRAVENTRICULAR
Status: DISPENSED
Start: 2022-10-04

## (undated) RX ORDER — FENTANYL CITRATE 50 UG/ML
INJECTION, SOLUTION INTRAMUSCULAR; INTRAVENOUS
Status: DISPENSED
Start: 2022-10-04

## (undated) RX ORDER — ONDANSETRON 2 MG/ML
INJECTION INTRAMUSCULAR; INTRAVENOUS
Status: DISPENSED
Start: 2023-04-11

## (undated) RX ORDER — EPHEDRINE SULFATE 50 MG/ML
INJECTION, SOLUTION INTRAMUSCULAR; INTRAVENOUS; SUBCUTANEOUS
Status: DISPENSED
Start: 2022-10-04

## (undated) RX ORDER — CEFAZOLIN SODIUM 1 G/3ML
INJECTION, POWDER, FOR SOLUTION INTRAMUSCULAR; INTRAVENOUS
Status: DISPENSED
Start: 2022-10-04

## (undated) RX ORDER — BUPIVACAINE HYDROCHLORIDE 2.5 MG/ML
INJECTION, SOLUTION EPIDURAL; INFILTRATION; INTRACAUDAL
Status: DISPENSED
Start: 2023-04-11

## (undated) RX ORDER — CEFAZOLIN SODIUM/WATER 2 G/20 ML
SYRINGE (ML) INTRAVENOUS
Status: DISPENSED
Start: 2023-04-11

## (undated) RX ORDER — OXYCODONE HYDROCHLORIDE 5 MG/1
TABLET ORAL
Status: DISPENSED
Start: 2023-04-11

## (undated) RX ORDER — PROPOFOL 10 MG/ML
INJECTION, EMULSION INTRAVENOUS
Status: DISPENSED
Start: 2022-10-04

## (undated) RX ORDER — FENTANYL CITRATE-0.9 % NACL/PF 10 MCG/ML
PLASTIC BAG, INJECTION (ML) INTRAVENOUS
Status: DISPENSED
Start: 2023-04-11

## (undated) RX ORDER — ONDANSETRON 2 MG/ML
INJECTION INTRAMUSCULAR; INTRAVENOUS
Status: DISPENSED
Start: 2022-10-04

## (undated) RX ORDER — ACETAMINOPHEN 325 MG/1
TABLET ORAL
Status: DISPENSED
Start: 2023-04-11

## (undated) RX ORDER — PROPOFOL 10 MG/ML
INJECTION, EMULSION INTRAVENOUS
Status: DISPENSED
Start: 2022-09-26

## (undated) RX ORDER — LIDOCAINE HYDROCHLORIDE 20 MG/ML
INJECTION, SOLUTION EPIDURAL; INFILTRATION; INTRACAUDAL; PERINEURAL
Status: DISPENSED
Start: 2022-09-26

## (undated) RX ORDER — BUPIVACAINE HYDROCHLORIDE 2.5 MG/ML
INJECTION, SOLUTION EPIDURAL; INFILTRATION; INTRACAUDAL
Status: DISPENSED
Start: 2022-09-26

## (undated) RX ORDER — GABAPENTIN 300 MG/1
CAPSULE ORAL
Status: DISPENSED
Start: 2023-04-11

## (undated) RX ORDER — LIDOCAINE HYDROCHLORIDE AND EPINEPHRINE 10; 10 MG/ML; UG/ML
INJECTION, SOLUTION INFILTRATION; PERINEURAL
Status: DISPENSED
Start: 2022-09-26

## (undated) RX ORDER — CEFAZOLIN SODIUM/WATER 2 G/20 ML
SYRINGE (ML) INTRAVENOUS
Status: DISPENSED
Start: 2022-09-26

## (undated) RX ORDER — PROPOFOL 10 MG/ML
INJECTION, EMULSION INTRAVENOUS
Status: DISPENSED
Start: 2023-04-11

## (undated) RX ORDER — EPHEDRINE SULFATE 50 MG/ML
INJECTION, SOLUTION INTRAMUSCULAR; INTRAVENOUS; SUBCUTANEOUS
Status: DISPENSED
Start: 2022-09-26

## (undated) RX ORDER — LIDOCAINE HYDROCHLORIDE AND EPINEPHRINE 10; 10 MG/ML; UG/ML
INJECTION, SOLUTION INFILTRATION; PERINEURAL
Status: DISPENSED
Start: 2023-04-11

## (undated) RX ORDER — ONDANSETRON 2 MG/ML
INJECTION INTRAMUSCULAR; INTRAVENOUS
Status: DISPENSED
Start: 2022-09-26

## (undated) RX ORDER — LIDOCAINE HYDROCHLORIDE 20 MG/ML
JELLY TOPICAL
Status: DISPENSED
Start: 2023-04-11

## (undated) RX ORDER — OXYCODONE HYDROCHLORIDE 5 MG/1
TABLET ORAL
Status: DISPENSED
Start: 2022-09-26

## (undated) RX ORDER — OXYCODONE HYDROCHLORIDE 5 MG/1
TABLET ORAL
Status: DISPENSED
Start: 2022-10-04

## (undated) RX ORDER — HYDROMORPHONE HCL IN WATER/PF 6 MG/30 ML
PATIENT CONTROLLED ANALGESIA SYRINGE INTRAVENOUS
Status: DISPENSED
Start: 2023-04-11

## (undated) RX ORDER — BUPIVACAINE HYDROCHLORIDE 2.5 MG/ML
INJECTION, SOLUTION EPIDURAL; INFILTRATION; INTRACAUDAL
Status: DISPENSED
Start: 2022-10-04